# Patient Record
Sex: FEMALE | Race: WHITE | NOT HISPANIC OR LATINO | Employment: OTHER | URBAN - METROPOLITAN AREA
[De-identification: names, ages, dates, MRNs, and addresses within clinical notes are randomized per-mention and may not be internally consistent; named-entity substitution may affect disease eponyms.]

---

## 2017-01-20 ENCOUNTER — OFFICE VISIT (OUTPATIENT)
Dept: INTERNAL MEDICINE | Facility: CLINIC | Age: 67
End: 2017-01-20

## 2017-01-20 VITALS
OXYGEN SATURATION: 95 % | HEIGHT: 66 IN | WEIGHT: 193 LBS | DIASTOLIC BLOOD PRESSURE: 88 MMHG | SYSTOLIC BLOOD PRESSURE: 140 MMHG | BODY MASS INDEX: 31.02 KG/M2 | TEMPERATURE: 98.6 F | RESPIRATION RATE: 16 BRPM | HEART RATE: 72 BPM

## 2017-01-20 DIAGNOSIS — M54.41 ACUTE RIGHT-SIDED LOW BACK PAIN WITH RIGHT-SIDED SCIATICA: Primary | ICD-10-CM

## 2017-01-20 PROBLEM — K63.5 COLON POLYP: Status: ACTIVE | Noted: 2017-01-20

## 2017-01-20 PROBLEM — M54.40 ACUTE RIGHT-SIDED LOW BACK PAIN WITH SCIATICA: Status: ACTIVE | Noted: 2017-01-20

## 2017-01-20 PROCEDURE — 99213 OFFICE O/P EST LOW 20 MIN: CPT | Performed by: INTERNAL MEDICINE

## 2017-01-20 NOTE — MR AVS SNAPSHOT
"                        Adelia Morris   1/20/2017 2:40 PM   Office Visit    Dept Phone:  [phone]   Encounter #:  [id number]    Provider:  Wu Yost MD   Department:  [facility] INTERNAL MEDICINE                Your Full Care Plan              Your Updated Medication List          This list is accurate as of: 1/20/17  3:13 PM.  Always use your most recent med list.                cetirizine 10 MG tablet   Commonly known as:  zyrTEC       CITRACAL PO       ibuprofen 200 MG tablet   Commonly known as:  ADVIL,MOTRIN       nabumetone 750 MG tablet   Commonly known as:  RELAFEN   Take 1 tablet by mouth 2 (two) times a day.       PEG-KCl-NaCl-NaSulf-Na Asc-C 100 G reconstituted solution   Commonly known as:  MOVIPREP   Take as directed.               You Were Diagnosed With        Codes Comments    Acute right-sided low back pain with right-sided sciatica    -  Primary ICD-10-CM: M54.41  ICD-9-CM: 724.2, 724.3 We will treat with a tapering dose of prednisone, to start 15 mg twice daily for 3 days, then 10 mg twice daily for 3 days, then 10 mg each morning for 7 days      Instructions     None    Patient Instructions History      Upcoming Appointments     Visit Type Date Time Department    OFFICE VISIT 1/20/2017  2:40 PM [facility] 3 [id number]      [employer] Signup     Our records indicate that you have declined [facility] [employer] signup. If you would like to sign up for [employer], please email [email or web address] or call [phone] to obtain an activation code.             Other Info from Your Visit           Allergies     No Known Allergies      Reason for Visit     Back Pain           Vital Signs     Blood Pressure Pulse Temperature Respirations Height Weight    140/88 (BP Location: Right arm, Patient Position: Sitting, Cuff Size: Small Adult) 72 98.6 °F (37 °C) (Tympanic) 16 65.5\" (166.4 cm) 193 lb (87.5 kg)    Oxygen Saturation Body Mass Index Smoking Status             95% 31.63 " kg/m2 Never Smoker         Problems and Diagnoses Noted     Acute right-sided low back pain with sciatica    Colon polyp

## 2017-01-20 NOTE — PROGRESS NOTES
Subjective   Adelia Morris is a 66 y.o. female.   10/24/2016  Wt Readings from Last 1 Encounters:   01/20/17 193 lb (87.5 kg)    she was last seen October 2016 for initial Medicare visit.    She returns for an acute care visit regarding right-sided low back pain    History of Present Illness   The patient states that in late December she helped her  left her couch and she pulled her back, describing a painful area in the right sacral region but also pain radiating down her Route right lower extremity to her ankle.  She has had similar symptoms in the past but this has continued.  No skin eruption has occurred.    She had a screening colonoscopy in December by Dr. Jarrod John was found to have a hyperplastic polyp.  She was advised to have a repeat colonoscopy after 5 years.  The following portions of the patient's history were reviewed and updated as appropriate: allergies, current medications, past family history, past medical history, past social history, past surgical history and problem list.    Review of Systems   Constitutional: Negative.    HENT: Negative.    Eyes: Negative.    Respiratory: Negative.    Cardiovascular: Negative.    Endocrine: Negative.    Genitourinary: Negative.    Musculoskeletal: Positive for back pain.   Skin: Negative.    Neurological: Negative.    Hematological: Negative.    Psychiatric/Behavioral: Negative.        Objective   Physical Exam   Constitutional: She appears well-developed and well-nourished.   HENT:   Head: Normocephalic and atraumatic.   Cardiovascular: Normal rate and regular rhythm.  Exam reveals no gallop and no friction rub.    No murmur heard.  Pulmonary/Chest: Effort normal and breath sounds normal. No respiratory distress. She has no wheezes. She has no rales.   Abdominal: Soft. Bowel sounds are normal. She exhibits no distension and no mass. There is no tenderness.   Musculoskeletal:   Lumbosacral spine flexion to 75°.  No symptoms on extension or  lateral flexion.  Straight leg raising is negative bilaterally   Neurological: She is alert.   Skin: Skin is warm.   Psychiatric: Her behavior is normal.   Vitals reviewed.      Assessment/Plan   Adelia was seen today for back pain.    Diagnoses and all orders for this visit:    Acute right-sided low back pain with right-sided sciatica  Comments:  We will treat with a tapering dose of prednisone, to start 15 mg twice daily for 3 days, then 10 mg twice daily for 3 days, then 10 mg each morning for 7 days        Return as scheduled, sooner if needed                       EMR Dragon/Transcription disclaimer:      Much of this encounter note is an electronic transcription/translation of spoken language to printed text. The electronic translation of spoken language may permit erroneous, or at times, nonsensical words or phrases to be inadvertently transcribed; Although I have reviewed the note for such errors, some may still exist.

## 2017-02-01 ENCOUNTER — OFFICE VISIT (OUTPATIENT)
Dept: INTERNAL MEDICINE | Facility: CLINIC | Age: 67
End: 2017-02-01

## 2017-02-01 VITALS
SYSTOLIC BLOOD PRESSURE: 139 MMHG | WEIGHT: 194 LBS | HEIGHT: 66 IN | OXYGEN SATURATION: 97 % | HEART RATE: 78 BPM | BODY MASS INDEX: 31.18 KG/M2 | TEMPERATURE: 98.4 F | DIASTOLIC BLOOD PRESSURE: 80 MMHG

## 2017-02-01 DIAGNOSIS — M54.41 ACUTE RIGHT-SIDED LOW BACK PAIN WITH RIGHT-SIDED SCIATICA: Primary | ICD-10-CM

## 2017-02-01 PROCEDURE — 99213 OFFICE O/P EST LOW 20 MIN: CPT | Performed by: INTERNAL MEDICINE

## 2017-02-01 RX ORDER — PREDNISONE 1 MG/1
TABLET ORAL
COMMUNITY
Start: 2017-01-20 | End: 2017-02-01

## 2017-02-01 NOTE — PROGRESS NOTES
Subjective   Adelia Morris is a 66 y.o. female.   1/20/2017  Wt Readings from Last 1 Encounters:   02/01/17 194 lb (88 kg)    she was last seen January 20 because of back pain.    She returns for recheck of right-sided low back pain radiating to the right leg    History of Present Illness   She presented January 20 at that time had right-sided low back pain with radiation to the right lower extremity.  She was treated with a tapering dose of prednisone.  She is also been using alternating ice and moist heat.  She still describes having some radiculopathy with pain radiating from her hip down into her foot and ankle, out to the big toe.  She has not had any weakness.  She describe bowel or bladder problems.  She is interested in trying physical therapy.  We have not obtained any radiologic studies thus far.  The following portions of the patient's history were reviewed and updated as appropriate: allergies, current medications, past family history, past medical history, past social history, past surgical history and problem list.    Review of Systems   Musculoskeletal: Positive for back pain.       Objective   Physical Exam   Abdominal: Soft. She exhibits no distension and no mass. There is no tenderness.   Musculoskeletal:   Lumbosacral anterior flexion about 35°.  Straight leg raising is negative bilaterally, gait is normal   Neurological: She has normal reflexes.       Assessment/Plan   Adelia was seen today for back pain.    Diagnoses and all orders for this visit:    Acute right-sided low back pain with right-sided sciatica  Comments:  I will refer for physical therapy, patient prefers [facility] in [place]  Orders:  -     Ambulatory Referral to Physical Therapy Evaluate and treat        I keep follow-up as before, sooner if needed                       EMR Dragon/Transcription disclaimer:      Much of this encounter note is an electronic transcription/translation of spoken language to  printed text. The electronic translation of spoken language may permit erroneous, or at times, nonsensical words or phrases to be inadvertently transcribed; Although I have reviewed the note for such errors, some may still exist.

## 2017-02-15 ENCOUNTER — TELEPHONE (OUTPATIENT)
Dept: INTERNAL MEDICINE | Facility: CLINIC | Age: 67
End: 2017-02-15

## 2017-02-15 RX ORDER — SULFAMETHOXAZOLE AND TRIMETHOPRIM 800; 160 MG/1; MG/1
1 TABLET ORAL 2 TIMES DAILY
Qty: 10 TABLET | Refills: 0 | Status: SHIPPED | OUTPATIENT
Start: 2017-02-15 | End: 2017-05-31

## 2017-02-15 NOTE — TELEPHONE ENCOUNTER
Pt called and said she thinks a bladder infection and would like to see if you could send her something in to the pharmacy. Please advise.

## 2017-04-10 ENCOUNTER — TELEPHONE (OUTPATIENT)
Dept: INTERNAL MEDICINE | Facility: CLINIC | Age: 67
End: 2017-04-10

## 2017-04-10 NOTE — TELEPHONE ENCOUNTER
Pt called and is still having congestion with clear drainage, swollen glands and ear pain after almost a month. Pt would like to know if we could prescribe a zpak. Please advise.

## 2017-04-10 NOTE — TELEPHONE ENCOUNTER
Advise chlorpheniramine 6 mg twice daily, and send Astelin nasal spray, one unit, 2 sprays in each nostril twice a day    I do not think that this is likely a bacterial infection

## 2017-04-11 RX ORDER — AZELASTINE 1 MG/ML
2 SPRAY, METERED NASAL 2 TIMES DAILY
Qty: 1 EACH | Refills: 0 | Status: SHIPPED | OUTPATIENT
Start: 2017-04-11 | End: 2020-10-02

## 2017-05-11 ENCOUNTER — TELEPHONE (OUTPATIENT)
Dept: INTERNAL MEDICINE | Facility: CLINIC | Age: 67
End: 2017-05-11

## 2017-05-11 DIAGNOSIS — M54.5 LOW BACK PAIN, UNSPECIFIED BACK PAIN LATERALITY, UNSPECIFIED CHRONICITY, WITH SCIATICA PRESENCE UNSPECIFIED: Primary | ICD-10-CM

## 2017-05-31 ENCOUNTER — OFFICE VISIT (OUTPATIENT)
Dept: ORTHOPEDIC SURGERY | Facility: CLINIC | Age: 67
End: 2017-05-31

## 2017-05-31 VITALS — HEIGHT: 67 IN | TEMPERATURE: 99.2 F | BODY MASS INDEX: 31.08 KG/M2 | WEIGHT: 198 LBS

## 2017-05-31 DIAGNOSIS — M48.061 SPINAL STENOSIS OF LUMBAR REGION: ICD-10-CM

## 2017-05-31 DIAGNOSIS — M43.16 SPONDYLOLISTHESIS OF LUMBAR REGION: ICD-10-CM

## 2017-05-31 DIAGNOSIS — M54.50 LUMBAR SPINE PAIN: Primary | ICD-10-CM

## 2017-05-31 PROCEDURE — 72100 X-RAY EXAM L-S SPINE 2/3 VWS: CPT | Performed by: ORTHOPAEDIC SURGERY

## 2017-05-31 PROCEDURE — 99204 OFFICE O/P NEW MOD 45 MIN: CPT | Performed by: ORTHOPAEDIC SURGERY

## 2017-06-05 ENCOUNTER — HOSPITAL ENCOUNTER (OUTPATIENT)
Dept: MRI IMAGING | Facility: HOSPITAL | Age: 67
Discharge: HOME | End: 2017-06-05
Attending: ORTHOPAEDIC SURGERY | Admitting: ORTHOPAEDIC SURGERY

## 2017-06-05 DIAGNOSIS — M54.50 LUMBAR SPINE PAIN: ICD-10-CM

## 2017-06-05 DIAGNOSIS — M48.061 SPINAL STENOSIS OF LUMBAR REGION: ICD-10-CM

## 2017-06-05 DIAGNOSIS — M43.16 SPONDYLOLISTHESIS OF LUMBAR REGION: ICD-10-CM

## 2017-06-05 PROCEDURE — 72148 MRI LUMBAR SPINE W/O DYE: CPT

## 2017-06-09 ENCOUNTER — TELEPHONE (OUTPATIENT)
Dept: ORTHOPEDIC SURGERY | Facility: CLINIC | Age: 67
End: 2017-06-09

## 2017-06-09 DIAGNOSIS — M43.16 SPONDYLOLISTHESIS OF LUMBAR REGION: ICD-10-CM

## 2017-06-09 DIAGNOSIS — M48.061 LUMBAR SPINAL STENOSIS: Primary | ICD-10-CM

## 2017-06-09 NOTE — TELEPHONE ENCOUNTER
Pt called back and she was informed of her results.  I have placed an order for epidurals,however she is not certain if she wants them.  Appt was scheduled for her to see KIMO on 6/16/17 to discuss her options.

## 2017-06-29 ENCOUNTER — HOSPITAL ENCOUNTER (OUTPATIENT)
Dept: PAIN MEDICINE | Facility: HOSPITAL | Age: 67
Discharge: HOME | End: 2017-06-29
Attending: ORTHOPAEDIC SURGERY | Admitting: ORTHOPAEDIC SURGERY

## 2017-06-29 ENCOUNTER — ANESTHESIA (OUTPATIENT)
Dept: PAIN MEDICINE | Facility: HOSPITAL | Age: 67
End: 2017-06-29

## 2017-06-29 ENCOUNTER — HOSPITAL ENCOUNTER (OUTPATIENT)
Dept: GENERAL RADIOLOGY | Facility: HOSPITAL | Age: 67
Discharge: HOME | End: 2017-06-29

## 2017-06-29 ENCOUNTER — ANESTHESIA EVENT (OUTPATIENT)
Dept: PAIN MEDICINE | Facility: HOSPITAL | Age: 67
End: 2017-06-29

## 2017-06-29 ENCOUNTER — TRANSCRIBE ORDERS (OUTPATIENT)
Dept: PAIN MEDICINE | Facility: HOSPITAL | Age: 67
End: 2017-06-29

## 2017-06-29 VITALS
DIASTOLIC BLOOD PRESSURE: 65 MMHG | TEMPERATURE: 97.6 F | WEIGHT: 193 LBS | SYSTOLIC BLOOD PRESSURE: 127 MMHG | RESPIRATION RATE: 16 BRPM | HEIGHT: 67 IN | HEART RATE: 67 BPM | BODY MASS INDEX: 30.29 KG/M2 | OXYGEN SATURATION: 96 %

## 2017-06-29 DIAGNOSIS — R52 PAIN: ICD-10-CM

## 2017-06-29 DIAGNOSIS — M48.061 LUMBAR SPINAL STENOSIS: Primary | ICD-10-CM

## 2017-06-29 DIAGNOSIS — M43.16 SPONDYLOLISTHESIS OF LUMBAR REGION: ICD-10-CM

## 2017-06-29 DIAGNOSIS — M48.061 LUMBAR SPINAL STENOSIS: ICD-10-CM

## 2017-06-29 PROCEDURE — C1755 CATHETER, INTRASPINAL: HCPCS

## 2017-06-29 PROCEDURE — 77003 FLUOROGUIDE FOR SPINE INJECT: CPT

## 2017-06-29 PROCEDURE — 0 IOPAMIDOL 41 % SOLUTION: Performed by: ANESTHESIOLOGY

## 2017-06-29 PROCEDURE — 25010000002 METHYLPREDNISOLONE PER 80 MG: Performed by: ANESTHESIOLOGY

## 2017-06-29 RX ORDER — LIDOCAINE HYDROCHLORIDE 10 MG/ML
1 INJECTION, SOLUTION INFILTRATION; PERINEURAL ONCE
Status: DISCONTINUED | OUTPATIENT
Start: 2017-06-29 | End: 2017-06-30 | Stop reason: HOSPADM

## 2017-06-29 RX ORDER — FENTANYL CITRATE 50 UG/ML
50 INJECTION, SOLUTION INTRAMUSCULAR; INTRAVENOUS
Status: DISCONTINUED | OUTPATIENT
Start: 2017-06-29 | End: 2017-06-30 | Stop reason: HOSPADM

## 2017-06-29 RX ORDER — MIDAZOLAM HYDROCHLORIDE 1 MG/ML
1 INJECTION INTRAMUSCULAR; INTRAVENOUS
Status: DISCONTINUED | OUTPATIENT
Start: 2017-06-29 | End: 2017-06-30 | Stop reason: HOSPADM

## 2017-06-29 RX ORDER — SODIUM CHLORIDE 0.9 % (FLUSH) 0.9 %
1-10 SYRINGE (ML) INJECTION AS NEEDED
Status: DISCONTINUED | OUTPATIENT
Start: 2017-06-29 | End: 2017-06-30 | Stop reason: HOSPADM

## 2017-06-29 RX ORDER — METHYLPREDNISOLONE ACETATE 80 MG/ML
80 INJECTION, SUSPENSION INTRA-ARTICULAR; INTRALESIONAL; INTRAMUSCULAR; SOFT TISSUE ONCE
Status: COMPLETED | OUTPATIENT
Start: 2017-06-29 | End: 2017-06-29

## 2017-06-29 RX ADMIN — METHYLPREDNISOLONE ACETATE 80 MG: 80 INJECTION, SUSPENSION INTRA-ARTICULAR; INTRALESIONAL; INTRAMUSCULAR; SOFT TISSUE at 08:34

## 2017-06-29 RX ADMIN — IOPAMIDOL 10 ML: 408 INJECTION, SOLUTION INTRATHECAL at 08:34

## 2017-06-29 NOTE — H&P
CHIEF COMPLAINT: right lower extremity pain      HISTORY OF PRESENT ILLNESS:  Since December after lifting a heavy count she began to experience painin her right buttock which radiated posteriorly down the right leg to the calf.  Intermittent in timing.  Between a 3 and 7 out of 10 on the pain scale.  Aching deep pressure dull burning tingling in nature.  Aggravated by activity and certain sitting positions.  She does not has done physical therapy for over 8 weeks with some improvement.  She has also taken ibuprofen with some relief.  It's affecting her ADLs her exercising her mood.    PAST MEDICAL HISTORY:  no known drug allergies  Medications include Advil zyrtec  Obesity with a BMI of 30    SOCIAL HISTORY:  No tobacco    REVIEW OF SYSTEMS:  No hematologic infectious or constitutional symptoms    PHYSICAL EXAM:  /77 pulse 83 respirations 16 sat 96% temp 36.4 height 5 foot 7 weight 193 pounds BMI 30  Well-developed well-nourished no acute distress  Extra ocular movements intact  Mallampati class II airway  Cardiac:  Regular rate and rhythm  Lungs:  Clear to auscultation bilaterally with good effort  Alert and oriented ×3  Deep tendon reflexes normal in the bilateral patella  positive straight leg raise bilaterally  5 out of 5 strength bilateral upper and lower extremities  Lumbar spine without obvious deformities ecchymoses  Lumbar spine nontender to palpation      DIAGNOSIS:  Lumbar Neuritis  Degeneration of lumbar intervertebral disc      PLAN:  1.  Lumbar epidural steroid injections, up to 3, spaced 1-2 weeks apart.  If pain control is acceptable after 1 or 2 injections, it was discussed with the patient that they may return for the subsequent injections if and when their pain returns.  The risks were discussed with the patient including failure of relief, worsening pain, Headache (post dural puncture headache), bleeding (epidural hematoma) and infection (epidural abscess or skin infection).  2.  Physical  therapy exercises at home as prescribed by physical therapy or from the pain clinic handout (given to the patient).  Continuation of these exercises every day, or multiple times per week, even when the patient has good pain relief, was stressed to the patient as a preventative measure to decrease the frequency and severity of future pain episodes.  3.  Continue pain medicines as already prescribed.  If patient not currently taking any, it is recommended to begin Acetaminophen 1000 mg po q 8 hours.  If other medicines containing Acetaminophen are currently prescribed, maintain daily dose at 3000 mg.    4.  If they can tolerate NSAIDS, it is recommended to take Ibuprofen 600 mg po q 6 hours for 7 days during pain exacerbations.  Alternatively, they may substitute an NSAID of their choice (e.g. Aleve).  This may be taken at the same time as Acetaminophen.  5.  Heat and ice to the affected area as tolerated for pain control.  It was discussed that heating pads can cause burns.  6.  Daily low impact exercise such as walking or water exercise was recommended to maintain overall health and aid in weight control.   7.  Follow up as needed for subsequent injections.  8.  Patient was counseled to abstain from tobacco products.

## 2017-06-29 NOTE — ANESTHESIA PROCEDURE NOTES
PAIN Epidural block    Patient location during procedure: pain clinic  Indication:procedure for pain  Performed By  Anesthesiologist: KATY SEARS  Preanesthetic Checklist  Completed: patient identified and risks and benefits discussed  Additional Notes  Diagnosis:     Lumbar Neuritis  Degeneration of lumbar intervertebral disc      Sedation:  no    A lumbar epidural steroid injection with fluoroscopic guidance and an Isovue dye epidurogram was performed.  Under fluoroscopic guidance, the epidural space was identified and accessed, confirmed by loss of resistance to saline followed by injection of Isovue dye, which shows a good epidurogram.  The above medications were injected uneventfully.    Epidural Block Prep:  Pt Position:prone  Sterile Tech:cap, gloves, mask and sterile barrier  Prep:chlorhexidine gluconate and isopropyl alcohol  Monitoring:blood pressure monitoring, continuous pulse oximetry and EKG  Epidural Block Procedure:  Approach:right paramedian  Guidance: fluoroscopy and landmark technique  Location:lumbar  Interspace: L5-S1.  Needle Type:Tuohy  Needle Gauge:20  Aspiration:negative  Medications:  Depomedrol:80  Preservative Free Saline:3mL  Isovue:2mL  Comments:Isovue dye reveals good epidurogram  Post Assessment:  Pt Tolerance:patient tolerated the procedure well with no apparent complications  Complications:no

## 2017-10-25 ENCOUNTER — CLINICAL SUPPORT (OUTPATIENT)
Dept: INTERNAL MEDICINE | Facility: CLINIC | Age: 67
End: 2017-10-25

## 2017-10-25 DIAGNOSIS — Z23 NEED FOR INFLUENZA VACCINATION: Primary | ICD-10-CM

## 2017-10-25 PROCEDURE — G0008 ADMIN INFLUENZA VIRUS VAC: HCPCS | Performed by: INTERNAL MEDICINE

## 2017-10-30 ENCOUNTER — TRANSCRIBE ORDERS (OUTPATIENT)
Dept: ADMINISTRATIVE | Facility: HOSPITAL | Age: 67
End: 2017-10-30

## 2017-10-30 DIAGNOSIS — Z13.820 SCREENING FOR OSTEOPOROSIS: ICD-10-CM

## 2017-10-30 DIAGNOSIS — Z12.31 VISIT FOR SCREENING MAMMOGRAM: Primary | ICD-10-CM

## 2017-11-30 ENCOUNTER — HOSPITAL ENCOUNTER (OUTPATIENT)
Dept: BONE DENSITY | Facility: HOSPITAL | Age: 67
Discharge: HOME | End: 2017-11-30
Attending: SPECIALIST

## 2017-11-30 ENCOUNTER — HOSPITAL ENCOUNTER (OUTPATIENT)
Dept: MAMMOGRAPHY | Facility: HOSPITAL | Age: 67
Discharge: HOME | End: 2017-11-30
Attending: SPECIALIST | Admitting: SPECIALIST

## 2017-11-30 DIAGNOSIS — Z13.820 SCREENING FOR OSTEOPOROSIS: ICD-10-CM

## 2017-11-30 DIAGNOSIS — Z12.31 VISIT FOR SCREENING MAMMOGRAM: ICD-10-CM

## 2017-11-30 PROCEDURE — G0202 SCR MAMMO BI INCL CAD: HCPCS

## 2017-11-30 PROCEDURE — 77063 BREAST TOMOSYNTHESIS BI: CPT

## 2017-11-30 PROCEDURE — 77080 DXA BONE DENSITY AXIAL: CPT

## 2018-01-26 ENCOUNTER — OFFICE VISIT (OUTPATIENT)
Dept: INTERNAL MEDICINE | Facility: CLINIC | Age: 68
End: 2018-01-26

## 2018-01-26 VITALS
HEART RATE: 99 BPM | HEIGHT: 67 IN | SYSTOLIC BLOOD PRESSURE: 130 MMHG | BODY MASS INDEX: 31.08 KG/M2 | DIASTOLIC BLOOD PRESSURE: 90 MMHG | RESPIRATION RATE: 16 BRPM | OXYGEN SATURATION: 98 % | WEIGHT: 198 LBS | TEMPERATURE: 100 F

## 2018-01-26 DIAGNOSIS — L27.0 ALLERGIC DRUG RASH: Primary | ICD-10-CM

## 2018-01-26 PROCEDURE — 99212 OFFICE O/P EST SF 10 MIN: CPT | Performed by: INTERNAL MEDICINE

## 2018-01-26 NOTE — PROGRESS NOTES
Subjective   Adelia Morris is a 67 y.o. female.   Visit date not found  Wt Readings from Last 1 Encounters:   01/26/18 89.8 kg (198 lb)   She was last seen February 2017, weight was 194 then, 198 now, with right foot cast in place    She returns for acute care visit because of rash she had right foot surgery on January 8 on a bunion.  She was prescribed Percocet when necessary for pain and clindamycin mouth 3 times a day for about 7 days.  She says that is last Sunday evening 5 days ago she developed hives on her back and then later had rash around the left breast area and across her chest, subsequently progressing to involve her legs.  She describes itching and burning and the rash did appear like hives.  She is had low-grade fever detected today which she was not aware of that.  She did take Benadryl this morning.  She has not had any of the clindamycin for a few days.  The clindamycin was prescribed as a prophylactic anabiotic and the surgical area on her right foot was inspected by the podiatrist and found to be healing well, not infected.    History of Present Illness     The following portions of the patient's history were reviewed and updated as appropriate: allergies, current medications, past family history, past medical history, past social history, past surgical history and problem list.    Review of Systems   Constitutional: Negative.    HENT: Negative.    Eyes: Negative.    Respiratory: Negative.    Cardiovascular: Negative.    Endocrine: Negative.    Genitourinary: Negative.    Skin: Positive for rash.   Neurological: Negative.    Hematological: Negative.    Psychiatric/Behavioral: Negative.        Objective   Physical Exam   Constitutional: She appears well-developed and well-nourished.   HENT:   Head: Normocephalic and atraumatic.   Cardiovascular: Normal rate and regular rhythm.  Exam reveals no gallop and no friction rub.    No murmur heard.  Pulmonary/Chest: Effort normal and breath sounds  normal. No respiratory distress. She has no wheezes. She has no rales.   Abdominal: She exhibits no distension and no mass. There is no tenderness.   Neurological: She is alert.   Skin: Skin is warm.   Faint erythematous rash back chest and legs   Psychiatric: Her behavior is normal.   Vitals reviewed.      Assessment/Plan   Adelia was seen today for rash.    Diagnoses and all orders for this visit:    Allergic drug rash  Comments:  Suspect that this is a allergic rash due to clindamycin.  Advise avoidance of clindamycin in the future and advise Zyrtec 10 mg daily for 10 days.  I will load allergy to clindamycin on the allergy record.  Call if the rash should recur    Follow-up as needed                               EMR Dragon/Transcription disclaimer:      Much of this encounter note is an electronic transcription/translation of spoken language to printed text. The electronic translation of spoken language may permit erroneous, or at times, nonsensical words or phrases to be inadvertently transcribed; Although I have reviewed the note for such errors, some may still exist.

## 2018-02-07 RX ORDER — OSELTAMIVIR PHOSPHATE 75 MG/1
75 CAPSULE ORAL DAILY
Qty: 10 CAPSULE | Refills: 0 | Status: SHIPPED | OUTPATIENT
Start: 2018-02-07 | End: 2018-11-07

## 2018-10-24 ENCOUNTER — TRANSCRIBE ORDERS (OUTPATIENT)
Dept: ADMINISTRATIVE | Facility: HOSPITAL | Age: 68
End: 2018-10-24

## 2018-10-24 DIAGNOSIS — Z12.39 SCREENING BREAST EXAMINATION: Primary | ICD-10-CM

## 2018-11-07 ENCOUNTER — OFFICE VISIT (OUTPATIENT)
Dept: INTERNAL MEDICINE | Facility: CLINIC | Age: 68
End: 2018-11-07

## 2018-11-07 VITALS
OXYGEN SATURATION: 98 % | DIASTOLIC BLOOD PRESSURE: 80 MMHG | HEIGHT: 67 IN | SYSTOLIC BLOOD PRESSURE: 120 MMHG | BODY MASS INDEX: 29.44 KG/M2 | TEMPERATURE: 97.8 F | RESPIRATION RATE: 16 BRPM | WEIGHT: 187.6 LBS | HEART RATE: 64 BPM

## 2018-11-07 DIAGNOSIS — Z00.00 HEALTHCARE MAINTENANCE: Primary | ICD-10-CM

## 2018-11-07 DIAGNOSIS — K63.5 POLYP OF COLON, UNSPECIFIED PART OF COLON, UNSPECIFIED TYPE: ICD-10-CM

## 2018-11-07 DIAGNOSIS — M85.859 OSTEOPENIA OF HIP, UNSPECIFIED LATERALITY: ICD-10-CM

## 2018-11-07 PROCEDURE — 90732 PPSV23 VACC 2 YRS+ SUBQ/IM: CPT | Performed by: INTERNAL MEDICINE

## 2018-11-07 PROCEDURE — G0009 ADMIN PNEUMOCOCCAL VACCINE: HCPCS | Performed by: INTERNAL MEDICINE

## 2018-11-07 PROCEDURE — G0439 PPPS, SUBSEQ VISIT: HCPCS | Performed by: INTERNAL MEDICINE

## 2018-11-07 NOTE — PROGRESS NOTES
QUICK REFERENCE INFORMATION:  The ABCs of the Annual Wellness Visit    Subsequent Medicare Wellness Visit    HEALTH RISK ASSESSMENT    1950    Recent Hospitalizations:  No hospitalization(s) within the last year..  She had right foot surgery in January because of a bunion.  His she has some stiffness but otherwise doing well.    She had allergic reaction to clindamycin in January and should avoid clindamycin in the future.    She has had some lumbar disc disease and had physical therapy wouldn't back is doing well now.    She is due for Pneumovax 23 and hepatitis C screening    She had bone mineral density checked and had osteopenia.  She has been advised vitamin D supplement.    She had colonoscopy December 2016 by Dr. Jarrod John      Current Medical Providers:  Patient Care Team:  Wu Yost MD as PCP - General  Derek Gonzales DPM as PCP - Claims Attributed    Dr Lacy Avila, Gynecologist    Smoking Status:  History   Smoking Status   • Never Smoker   Smokeless Tobacco   • Never Used       Alcohol Consumption:  History   Alcohol Use No       Depression Screen:   PHQ-2/PHQ-9 Depression Screening 11/7/2018   Little interest or pleasure in doing things 0   Feeling down, depressed, or hopeless 0   Trouble falling or staying asleep, or sleeping too much -   Feeling tired or having little energy -   Poor appetite or overeating -   Feeling bad about yourself - or that you are a failure or have let yourself or your family down -   Trouble concentrating on things, such as reading the newspaper or watching television -   Moving or speaking so slowly that other people could have noticed. Or the opposite - being so fidgety or restless that you have been moving around a lot more than usual -   Thoughts that you would be better off dead, or of hurting yourself in some way -   Total Score 0   If you checked off any problems, how difficult have these problems made it for you to do your work, take care of things  at home, or get along with other people? -       Health Habits and Functional and Cognitive Screening:  Functional & Cognitive Status 11/7/2018   Do you have difficulty preparing food and eating? No   Do you have difficulty bathing yourself, getting dressed or grooming yourself? No   Do you have difficulty using the toilet? No   Do you have difficulty moving around from place to place? No   Do you have trouble with steps or getting out of a bed or a chair? No   In the past year have you fallen or experienced a near fall? No   Current Diet Well Balanced Diet   Dental Exam Up to date   Eye Exam Up to date   Exercise (times per week) 4 times per week   Current Exercise Activities Include Cardiovasular Workout on Exercise Equipment   Do you need help using the phone?  No   Are you deaf or do you have serious difficulty hearing?  No   Do you need help with transportation? No   Do you need help shopping? No   Do you need help preparing meals?  No   Do you need help with housework?  No   Do you need help with laundry? No   Do you need help taking your medications? No   Do you need help managing money? No   Do you ever drive or ride in a car without wearing a seat belt? No   Have you felt unusual stress, anger or loneliness in the last month? No   Who do you live with? Spouse   If you need help, do you have trouble finding someone available to you? No   Have you been bothered in the last four weeks by sexual problems? No   Do you have difficulty concentrating, remembering or making decisions? No           Does the patient have evidence of cognitive impairment? No    Aspirin use counseling: Does not need ASA (and currently is not on it)      Recent Lab Results:  CMP:  Lab Results   Component Value Date    GLU 91 10/24/2016    BUN 10 10/24/2016    CREATININE 0.79 10/24/2016    EGFRIFNONA 73 10/24/2016    EGFRIFAFRI 89 10/24/2016    BCR 12.7 10/24/2016     10/24/2016    K 4.6 10/24/2016    CO2 27.0 10/24/2016    CALCIUM  10.0 10/24/2016    PROTENTOTREF 7.7 10/24/2016    ALBUMIN 5.00 10/24/2016    LABGLOBREF 2.7 10/24/2016    LABIL2 1.9 10/24/2016    BILITOT 0.3 10/24/2016    ALKPHOS 62 10/24/2016    AST 11 10/24/2016    ALT 14 10/24/2016     Lipid Panel:  Lab Results   Component Value Date    TRIG 86 10/24/2016    HDL 57 10/24/2016    VLDL 17.2 10/24/2016     HbA1c:  Lab Results   Component Value Date    HGBA1C 5.50 10/24/2016       Visual Acuity:  No exam data present    Age-appropriate Screening Schedule:  Refer to the list below for future screening recommendations based on patient's age, sex and/or medical conditions. Orders for these recommended tests are listed in the plan section. The patient has been provided with a written plan.    Health Maintenance   Topic Date Due   • TDAP/TD VACCINES (1 - Tdap) 06/16/1970   • ZOSTER VACCINE (1 of 2) 06/16/2001   • PNEUMOCOCCAL VACCINES (65+ LOW/MEDIUM RISK) (2 of 2 - PPSV23) 10/24/2017   • MAMMOGRAM  11/30/2019   • COLONOSCOPY  12/09/2026   • INFLUENZA VACCINE  Completed        Subjective   History of Present Illness    Adelia Morris is a 68 y.o. female who presents for an Subsequent Wellness Visit.    The following portions of the patient's history were reviewed and updated as appropriate: allergies, current medications, past family history, past medical history, past social history, past surgical history and problem list.    Outpatient Medications Prior to Visit   Medication Sig Dispense Refill   • azelastine (ASTELIN) 0.1 % nasal spray 2 sprays into each nostril 2 (Two) Times a Day. Use in each nostril as directed 1 each 0   • Calcium Citrate (CITRACAL PO) Take  by mouth.     • cetirizine (ZyrTEC) 10 MG tablet Take 10 mg by mouth daily.     • hydrocortisone 2.5 % cream      • ibuprofen (ADVIL,MOTRIN) 200 MG tablet Take 200 mg by mouth Every 6 (Six) Hours As Needed for mild pain (1-3).     • oseltamivir (TAMIFLU) 75 MG capsule Take 1 capsule by mouth Daily. 10 capsule 0     No  "facility-administered medications prior to visit.        Patient Active Problem List   Diagnosis   • Healthcare maintenance   • Encounter for screening colonoscopy   • Colon polyp   • Acute right-sided low back pain with sciatica   • Allergic drug rash       Advance Care Planning:  has an advance directive - a copy has been provided and is in file    Identification of Risk Factors:  Risk factors include: cardiovascular risk.    Review of Systems    Compared to one year ago, the patient feels her physical health is better.  Compared to one year ago, the patient feels her mental health is the same.    Objective     Physical Exam    Vitals:    11/07/18 0956   BP: 120/80   BP Location: Left arm   Patient Position: Sitting   Cuff Size: Small Adult   Pulse: 64   Resp: 16   Temp: 97.8 °F (36.6 °C)   TempSrc: Tympanic   SpO2: 98%   Weight: 85.1 kg (187 lb 9.6 oz)   Height: 170.2 cm (67\")   PainSc: 0-No pain       Patient's Body mass index is 29.38 kg/m². BMI is above normal parameters. Recommendations include: exercise counseling and nutrition counseling.      Assessment/Plan   Patient Self-Management and Personalized Health Advice  The patient has been provided with information about: weight management and prevention of cardiac or vascular disease and preventive services including:   · Counseling for cardiovascular disease risk reduction, Pneumococcal vaccine .    Visit Diagnoses:  No diagnosis found.    No orders of the defined types were placed in this encounter.      Outpatient Encounter Prescriptions as of 11/7/2018   Medication Sig Dispense Refill   • azelastine (ASTELIN) 0.1 % nasal spray 2 sprays into each nostril 2 (Two) Times a Day. Use in each nostril as directed 1 each 0   • Calcium Citrate (CITRACAL PO) Take  by mouth.     • cetirizine (ZyrTEC) 10 MG tablet Take 10 mg by mouth daily.     • hydrocortisone 2.5 % cream      • ibuprofen (ADVIL,MOTRIN) 200 MG tablet Take 200 mg by mouth Every 6 (Six) Hours As Needed " for mild pain (1-3).     • [DISCONTINUED] oseltamivir (TAMIFLU) 75 MG capsule Take 1 capsule by mouth Daily. 10 capsule 0     No facility-administered encounter medications on file as of 11/7/2018.        Reviewed use of high risk medication in the elderly: yes  Reviewed for potential of harmful drug interactions in the elderly: yes    Follow Up:  No Follow-up on file.   She will see Dr. Brian Khanna for medical follow-up in the future    An After Visit Summary and PPPS with all of these plans were given to the patient.

## 2018-11-08 LAB
ALBUMIN SERPL-MCNC: 4.2 G/DL (ref 3.5–5.2)
ALBUMIN/GLOB SERPL: 1.5 G/DL
ALP SERPL-CCNC: 67 U/L (ref 39–117)
ALT SERPL-CCNC: 11 U/L (ref 1–33)
AST SERPL-CCNC: 10 U/L (ref 1–32)
BILIRUB SERPL-MCNC: 0.4 MG/DL (ref 0.1–1.2)
BUN SERPL-MCNC: 10 MG/DL (ref 8–23)
BUN/CREAT SERPL: 12.7 (ref 7–25)
CALCIUM SERPL-MCNC: 9.8 MG/DL (ref 8.6–10.5)
CHLORIDE SERPL-SCNC: 104 MMOL/L (ref 98–107)
CO2 SERPL-SCNC: 24.2 MMOL/L (ref 22–29)
CREAT SERPL-MCNC: 0.79 MG/DL (ref 0.57–1)
GLOBULIN SER CALC-MCNC: 2.8 GM/DL
GLUCOSE SERPL-MCNC: 86 MG/DL (ref 65–99)
HCV AB S/CO SERPL IA: <0.1 S/CO RATIO (ref 0–0.9)
POTASSIUM SERPL-SCNC: 4.3 MMOL/L (ref 3.5–5.2)
PROT SERPL-MCNC: 7 G/DL (ref 6–8.5)
SODIUM SERPL-SCNC: 142 MMOL/L (ref 136–145)

## 2018-11-12 ENCOUNTER — TELEPHONE (OUTPATIENT)
Dept: INTERNAL MEDICINE | Facility: CLINIC | Age: 68
End: 2018-11-12

## 2018-11-12 NOTE — TELEPHONE ENCOUNTER
Pt called and said she has some hard nodules like in her muscle region of her left arm and would like to know if she should be concerned or should make an appointment. Please advise.

## 2018-12-03 ENCOUNTER — HOSPITAL ENCOUNTER (OUTPATIENT)
Dept: MAMMOGRAPHY | Facility: HOSPITAL | Age: 68
Discharge: HOME | End: 2018-12-03
Attending: INTERNAL MEDICINE | Admitting: INTERNAL MEDICINE

## 2018-12-03 DIAGNOSIS — Z12.39 SCREENING BREAST EXAMINATION: ICD-10-CM

## 2018-12-03 PROCEDURE — 77063 BREAST TOMOSYNTHESIS BI: CPT

## 2018-12-03 PROCEDURE — 77067 SCR MAMMO BI INCL CAD: CPT

## 2018-12-19 ENCOUNTER — OFFICE VISIT (OUTPATIENT)
Dept: INTERNAL MEDICINE | Facility: CLINIC | Age: 68
End: 2018-12-19

## 2018-12-19 VITALS
HEART RATE: 80 BPM | HEIGHT: 67 IN | SYSTOLIC BLOOD PRESSURE: 130 MMHG | WEIGHT: 190.2 LBS | OXYGEN SATURATION: 98 % | TEMPERATURE: 98.1 F | RESPIRATION RATE: 16 BRPM | DIASTOLIC BLOOD PRESSURE: 80 MMHG | BODY MASS INDEX: 29.85 KG/M2

## 2018-12-19 DIAGNOSIS — L72.9 CUTANEOUS CYST: Primary | ICD-10-CM

## 2018-12-19 PROCEDURE — 99212 OFFICE O/P EST SF 10 MIN: CPT | Performed by: INTERNAL MEDICINE

## 2018-12-19 NOTE — PROGRESS NOTES
Subjective   Adelia Morris is a 68 y.o. female.   11/7/2018  Wt Readings from Last 1 Encounters:   12/19/18 86.3 kg (190 lb 3.2 oz)   She comes in for acute care visit out of concern of a lump or nodule left brachial area    History of Present Illness   She has had a small nodule mid left brachial area anteriorly.  This been present for a while but she forgot to mention that on the last office visit.  It is not associated with pain or redness  The following portions of the patient's history were reviewed and updated as appropriate: allergies, current medications, past family history, past medical history, past social history, past surgical history and problem list.    Review of Systems   Constitutional: Negative.    HENT: Negative.    Eyes: Negative.    Respiratory: Negative.    Cardiovascular: Negative.    Endocrine: Negative.    Genitourinary: Negative.    Skin: Negative.    Neurological: Negative.    Hematological: Negative.    Psychiatric/Behavioral: Negative.        Objective   Physical Exam   Constitutional: She appears well-developed and well-nourished.   Skin:   There is small and intradermal nodule left mid brachium anteriorly with no superficial skin lesion.  No tenderness or erythema is present   Vitals reviewed.      Assessment/Plan   Adelia was seen today for mass.    Diagnoses and all orders for this visit:    Cutaneous cyst  Comments:  Small intradermal nodule mid left brachium, consistent with interdermal cyst, advise observation, suggest dermatologist evaluation when she sees him                               EMR Dragon/Transcription disclaimer:      Much of this encounter note is an electronic transcription/translation of spoken language to printed text. The electronic translation of spoken language may permit erroneous, or at times, nonsensical words or phrases to be inadvertently transcribed; Although I have reviewed the note for such errors, some may still exist.

## 2019-01-04 ENCOUNTER — OFFICE VISIT (OUTPATIENT)
Dept: FAMILY MEDICINE CLINIC | Facility: CLINIC | Age: 69
End: 2019-01-04

## 2019-01-04 VITALS
RESPIRATION RATE: 14 BRPM | BODY MASS INDEX: 29.51 KG/M2 | OXYGEN SATURATION: 98 % | HEART RATE: 69 BPM | DIASTOLIC BLOOD PRESSURE: 70 MMHG | SYSTOLIC BLOOD PRESSURE: 120 MMHG | TEMPERATURE: 97.8 F | WEIGHT: 188 LBS | HEIGHT: 67 IN

## 2019-01-04 DIAGNOSIS — M54.40 ACUTE RIGHT-SIDED LOW BACK PAIN WITH SCIATICA, SCIATICA LATERALITY UNSPECIFIED: Primary | ICD-10-CM

## 2019-01-04 PROCEDURE — 99214 OFFICE O/P EST MOD 30 MIN: CPT | Performed by: INTERNAL MEDICINE

## 2019-01-04 RX ORDER — CHOLECALCIFEROL (VITAMIN D3) 50 MCG
2000 TABLET ORAL DAILY
COMMUNITY
End: 2020-09-21 | Stop reason: DRUGHIGH

## 2019-01-04 NOTE — PROGRESS NOTES
Subjective   Adelia Morris is a 68 y.o. female.     History of Present Illness   Patient was seen for low back pain.  She has chronic low back pain that is been controlled with her NSAID.  Patient will have labs drawn and follow-up.    Dictated utilizing Dragon dictation. If there are questions or for further clarification, please contact me.   The following portions of the patient's history were reviewed and updated as appropriate: allergies, current medications, past family history, past medical history, past social history, past surgical history and problem list.    Review of Systems   Constitutional: Negative for fatigue and fever.   HENT: Positive for congestion. Negative for trouble swallowing.    Eyes: Negative for discharge and visual disturbance.   Respiratory: Negative for choking and shortness of breath.    Cardiovascular: Negative for chest pain and palpitations.   Gastrointestinal: Negative for abdominal pain and blood in stool.   Endocrine: Negative.    Genitourinary: Negative for genital sores and hematuria.   Musculoskeletal: Positive for back pain. Negative for gait problem and joint swelling.   Skin: Negative for color change, pallor, rash and wound.   Allergic/Immunologic: Positive for environmental allergies. Negative for immunocompromised state.   Neurological: Negative for facial asymmetry and speech difficulty.   Psychiatric/Behavioral: Negative for hallucinations and suicidal ideas.       Objective   Physical Exam   Constitutional: She is oriented to person, place, and time. She appears well-developed and well-nourished.   HENT:   Head: Normocephalic.   Eyes: Conjunctivae are normal. Pupils are equal, round, and reactive to light.   Neck: Normal range of motion. Neck supple.   Cardiovascular: Normal rate, regular rhythm and normal heart sounds.   Pulmonary/Chest: Effort normal and breath sounds normal.   Abdominal: Soft. Bowel sounds are normal.   Musculoskeletal: She exhibits edema and  tenderness.   Neurological: She is alert and oriented to person, place, and time.   Skin: Skin is warm and dry.   Psychiatric: She has a normal mood and affect. Her behavior is normal. Judgment and thought content normal.   Nursing note and vitals reviewed.      Assessment/Plan   Problems Addressed this Visit        Nervous and Auditory    Acute right-sided low back pain with sciatica - Primary

## 2019-01-23 NOTE — TELEPHONE ENCOUNTER
----- Message from Luca Rubio MD sent at 6/9/2017 12:27 PM EDT -----  Tell her the MRI shows nerve compression, not quite as bad as I thought we would see that significant, and to try epidural injections.  If she fails to improve she needs to come back to discuss surgery.   Preventive measure

## 2019-06-28 ENCOUNTER — LAB (OUTPATIENT)
Dept: FAMILY MEDICINE CLINIC | Facility: CLINIC | Age: 69
End: 2019-06-28

## 2019-06-28 DIAGNOSIS — Z00.00 HEALTHCARE MAINTENANCE: Primary | ICD-10-CM

## 2019-07-05 ENCOUNTER — OFFICE VISIT (OUTPATIENT)
Dept: FAMILY MEDICINE CLINIC | Facility: CLINIC | Age: 69
End: 2019-07-05

## 2019-07-05 VITALS
OXYGEN SATURATION: 96 % | SYSTOLIC BLOOD PRESSURE: 132 MMHG | BODY MASS INDEX: 30.13 KG/M2 | WEIGHT: 192 LBS | TEMPERATURE: 97.8 F | DIASTOLIC BLOOD PRESSURE: 74 MMHG | HEART RATE: 60 BPM | RESPIRATION RATE: 15 BRPM | HEIGHT: 67 IN

## 2019-07-05 DIAGNOSIS — D12.2 ADENOMATOUS POLYP OF ASCENDING COLON: ICD-10-CM

## 2019-07-05 DIAGNOSIS — M85.859 OSTEOPENIA OF HIP, UNSPECIFIED LATERALITY: ICD-10-CM

## 2019-07-05 DIAGNOSIS — M54.40 ACUTE RIGHT-SIDED LOW BACK PAIN WITH SCIATICA, SCIATICA LATERALITY UNSPECIFIED: Primary | ICD-10-CM

## 2019-07-05 DIAGNOSIS — R93.3 ABNORMAL FINDINGS ON DIAGNOSTIC IMAGING OF OTHER PARTS OF DIGESTIVE TRACT: ICD-10-CM

## 2019-07-05 DIAGNOSIS — E55.9 VITAMIN D DEFICIENCY: ICD-10-CM

## 2019-07-05 LAB
25(OH)D3 SERPL-MCNC: 45.8 NG/ML (ref 30–100)
ALBUMIN SERPL-MCNC: 4.2 G/DL (ref 3.5–5.2)
ALBUMIN/GLOB SERPL: 1.5 G/DL
ALP SERPL-CCNC: 61 U/L (ref 39–117)
ALT SERPL W P-5'-P-CCNC: 14 U/L (ref 1–33)
ANION GAP SERPL CALCULATED.3IONS-SCNC: 10.9 MMOL/L (ref 5–15)
AST SERPL-CCNC: 14 U/L (ref 1–32)
BILIRUB SERPL-MCNC: 0.5 MG/DL (ref 0.2–1.2)
BUN BLD-MCNC: 9 MG/DL (ref 8–23)
BUN/CREAT SERPL: 12.5 (ref 7–25)
CALCIUM SPEC-SCNC: 9.7 MG/DL (ref 8.6–10.5)
CHLORIDE SERPL-SCNC: 106 MMOL/L (ref 98–107)
CHOLEST SERPL-MCNC: 146 MG/DL (ref 0–200)
CO2 SERPL-SCNC: 25.1 MMOL/L (ref 22–29)
CREAT BLD-MCNC: 0.72 MG/DL (ref 0.57–1)
DEPRECATED RDW RBC AUTO: 43.2 FL (ref 37–54)
ERYTHROCYTE [DISTWIDTH] IN BLOOD BY AUTOMATED COUNT: 12.5 % (ref 12.3–15.4)
GFR SERPL CREATININE-BSD FRML MDRD: 81 ML/MIN/1.73
GLOBULIN UR ELPH-MCNC: 2.8 GM/DL
GLUCOSE BLD-MCNC: 92 MG/DL (ref 65–99)
HCT VFR BLD AUTO: 41.9 % (ref 34–46.6)
HDLC SERPL-MCNC: 53 MG/DL (ref 40–60)
HGB BLD-MCNC: 13.5 G/DL (ref 12–15.9)
LDLC SERPL CALC-MCNC: 82 MG/DL (ref 0–100)
LDLC/HDLC SERPL: 1.54 {RATIO}
MCH RBC QN AUTO: 30.3 PG (ref 26.6–33)
MCHC RBC AUTO-ENTMCNC: 32.2 G/DL (ref 31.5–35.7)
MCV RBC AUTO: 94.2 FL (ref 79–97)
PLATELET # BLD AUTO: 237 10*3/MM3 (ref 140–450)
PMV BLD AUTO: 9.3 FL (ref 6–12)
POTASSIUM BLD-SCNC: 4.3 MMOL/L (ref 3.5–5.2)
PROT SERPL-MCNC: 7 G/DL (ref 6–8.5)
RBC # BLD AUTO: 4.45 10*6/MM3 (ref 3.77–5.28)
SODIUM BLD-SCNC: 142 MMOL/L (ref 136–145)
TRIGL SERPL-MCNC: 56 MG/DL (ref 0–150)
VLDLC SERPL-MCNC: 11.2 MG/DL (ref 5–40)
WBC NRBC COR # BLD: 4.64 10*3/MM3 (ref 3.4–10.8)

## 2019-07-05 PROCEDURE — 82306 VITAMIN D 25 HYDROXY: CPT | Performed by: INTERNAL MEDICINE

## 2019-07-05 PROCEDURE — 85027 COMPLETE CBC AUTOMATED: CPT | Performed by: INTERNAL MEDICINE

## 2019-07-05 PROCEDURE — 36415 COLL VENOUS BLD VENIPUNCTURE: CPT | Performed by: INTERNAL MEDICINE

## 2019-07-05 PROCEDURE — 99214 OFFICE O/P EST MOD 30 MIN: CPT | Performed by: INTERNAL MEDICINE

## 2019-07-05 PROCEDURE — 80053 COMPREHEN METABOLIC PANEL: CPT | Performed by: INTERNAL MEDICINE

## 2019-07-05 PROCEDURE — 80061 LIPID PANEL: CPT | Performed by: INTERNAL MEDICINE

## 2019-07-05 NOTE — PROGRESS NOTES
Subjective   Adelia Morris is a 69 y.o. female.     History of Present Illness   Patient was seen for low back pain.  Is being controlled with Motrin.  She does have osteopenia of the hip and is using vitamin D.  She has a history of adenomatous polyps being follow-up by GI.  Patient did have labs drawn today.    Dictated utilizing Dragon dictation. If there are questions or for further clarification, please contact me.  The following portions of the patient's history were reviewed and updated as appropriate: allergies, current medications, past family history, past medical history, past social history, past surgical history and problem list.    Review of Systems   Constitutional: Negative for fatigue and fever.   HENT: Positive for congestion. Negative for trouble swallowing.    Eyes: Negative for discharge and visual disturbance.   Respiratory: Negative for choking and shortness of breath.    Cardiovascular: Negative for chest pain and palpitations.   Gastrointestinal: Negative for abdominal pain and blood in stool.   Endocrine: Negative.    Genitourinary: Negative for genital sores and hematuria.   Musculoskeletal: Positive for back pain. Negative for gait problem and joint swelling.   Skin: Negative for color change, pallor, rash and wound.   Allergic/Immunologic: Positive for environmental allergies. Negative for immunocompromised state.   Neurological: Negative for facial asymmetry and speech difficulty.   Psychiatric/Behavioral: Negative for hallucinations and suicidal ideas.       Objective   Physical Exam   Constitutional: She is oriented to person, place, and time. She appears well-developed and well-nourished.   HENT:   Head: Normocephalic.   Eyes: Conjunctivae are normal. Pupils are equal, round, and reactive to light.   Neck: Normal range of motion. Neck supple.   Cardiovascular: Normal rate, regular rhythm and normal heart sounds.   Pulmonary/Chest: Effort normal and breath sounds normal.   Abdominal:  Soft. Bowel sounds are normal.   Musculoskeletal: Normal range of motion. She exhibits tenderness.   Neurological: She is alert and oriented to person, place, and time.   Skin: Skin is warm and dry.   Psychiatric: She has a normal mood and affect. Her behavior is normal. Judgment and thought content normal.   Nursing note and vitals reviewed.      Assessment/Plan   Problems Addressed this Visit        Digestive    Colon polyp    Relevant Orders    CBC (No Diff)    Comprehensive Metabolic Panel    Lipid Panel    Vitamin D 25 Hydroxy       Nervous and Auditory    Acute right-sided low back pain with sciatica - Primary    Relevant Orders    CBC (No Diff)    Comprehensive Metabolic Panel    Lipid Panel    Vitamin D 25 Hydroxy       Musculoskeletal and Integument    Osteopenia of hip    Relevant Orders    CBC (No Diff)    Comprehensive Metabolic Panel    Lipid Panel    Vitamin D 25 Hydroxy      Other Visit Diagnoses     Abnormal findings on diagnostic imaging of other parts of digestive tract         Relevant Orders    Lipid Panel    Vitamin D deficiency         Relevant Orders    Vitamin D 25 Hydroxy

## 2019-08-08 ENCOUNTER — OFFICE VISIT (OUTPATIENT)
Dept: FAMILY MEDICINE CLINIC | Facility: CLINIC | Age: 69
End: 2019-08-08

## 2019-08-08 VITALS
DIASTOLIC BLOOD PRESSURE: 78 MMHG | TEMPERATURE: 98.3 F | HEART RATE: 70 BPM | BODY MASS INDEX: 30.83 KG/M2 | OXYGEN SATURATION: 97 % | WEIGHT: 196.4 LBS | HEIGHT: 67 IN | SYSTOLIC BLOOD PRESSURE: 120 MMHG

## 2019-08-08 DIAGNOSIS — M54.50 CHRONIC LOW BACK PAIN WITHOUT SCIATICA, UNSPECIFIED BACK PAIN LATERALITY: Primary | ICD-10-CM

## 2019-08-08 DIAGNOSIS — M54.6 BILATERAL THORACIC BACK PAIN, UNSPECIFIED CHRONICITY: ICD-10-CM

## 2019-08-08 DIAGNOSIS — G89.29 CHRONIC LOW BACK PAIN WITHOUT SCIATICA, UNSPECIFIED BACK PAIN LATERALITY: Primary | ICD-10-CM

## 2019-08-08 PROCEDURE — 99213 OFFICE O/P EST LOW 20 MIN: CPT | Performed by: NURSE PRACTITIONER

## 2019-08-08 NOTE — PATIENT INSTRUCTIONS
Referred to PT she will call for appt,   Cont motrin as needed for pain,  Deferred imaging today.   Increase fluid intake, get plenty of rest.   Patient agrees with plan of care and understands instructions. Call if worsening symptoms or any problems or concerns.

## 2019-08-08 NOTE — PROGRESS NOTES
Subjective   Adelia Morris is a 69 y.o. female.     History of Present Illness   C/o back pain, states pain is mid back down to low back, she has hx of chronic back pain, taking motrin as needed for pain, she had MRI lumbar in 2017 saw ortho Dr. Rubio, showed severe loss of disc space at L5-S1. States she lifted cough about 2 years ago and has had back pain since then, tried PT, she tried muscle relaxer but did not help, she has had right low back pain. 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 She needs new referral for PT. She has burning in upper back. She has had trouble sleeping d/t pain. She states epidurals did not help. States she has relief with aleve. Takes only when needed. She is UTD on labs.       The following portions of the patient's history were reviewed and updated as appropriate: allergies, current medications, past family history, past medical history, past social history, past surgical history and problem list.    Review of Systems   Constitutional: Negative for chills, diaphoresis and fever.   Respiratory: Negative for cough and shortness of breath.    Cardiovascular: Negative for chest pain.   Musculoskeletal: Positive for back pain. Negative for arthralgias and myalgias.   Neurological: Negative for dizziness, light-headedness and headache.   All other systems reviewed and are negative.      Objective   Physical Exam   Constitutional: She is oriented to person, place, and time. She appears well-developed and well-nourished.   HENT:   Head: Normocephalic.   Eyes: Pupils are equal, round, and reactive to light.   Neck: Normal range of motion.   Cardiovascular: Normal rate, regular rhythm and normal heart sounds.   Pulmonary/Chest: Effort normal and breath sounds normal.   Musculoskeletal:        Cervical back: She exhibits decreased range of motion and tenderness.        Thoracic back: She exhibits tenderness.        Lumbar back: She exhibits  tenderness.   Neurological: She is alert and oriented to person, place, and time.   Skin: Skin is warm and dry.   Psychiatric: She has a normal mood and affect. Her behavior is normal.   Nursing note and vitals reviewed.        Assessment/Plan   Adelia was seen today for back pain.    Diagnoses and all orders for this visit:    Chronic low back pain without sciatica, unspecified back pain laterality  -     Ambulatory Referral to Physical Therapy Evaluate and treat    Bilateral thoracic back pain, unspecified chronicity  -     Ambulatory Referral to Physical Therapy Evaluate and treat      Referred to PT she will call for appt,   Cont motrin as needed for pain,  Deferred imaging today.   Increase fluid intake, get plenty of rest.   Patient agrees with plan of care and understands instructions. Call if worsening symptoms or any problems or concerns.

## 2019-10-31 ENCOUNTER — TRANSCRIBE ORDERS (OUTPATIENT)
Dept: ADMINISTRATIVE | Facility: HOSPITAL | Age: 69
End: 2019-10-31

## 2019-10-31 DIAGNOSIS — Z12.39 SCREENING BREAST EXAMINATION: Primary | ICD-10-CM

## 2019-12-04 ENCOUNTER — HOSPITAL ENCOUNTER (OUTPATIENT)
Dept: MAMMOGRAPHY | Facility: HOSPITAL | Age: 69
Discharge: HOME | End: 2019-12-04
Admitting: SPECIALIST

## 2019-12-04 DIAGNOSIS — Z12.39 SCREENING BREAST EXAMINATION: ICD-10-CM

## 2019-12-04 PROCEDURE — 77063 BREAST TOMOSYNTHESIS BI: CPT

## 2019-12-04 PROCEDURE — 77067 SCR MAMMO BI INCL CAD: CPT

## 2019-12-31 ENCOUNTER — TELEPHONE (OUTPATIENT)
Dept: FAMILY MEDICINE CLINIC | Facility: CLINIC | Age: 69
End: 2019-12-31

## 2020-01-02 DIAGNOSIS — M54.2 NECK PAIN: Primary | ICD-10-CM

## 2020-01-24 ENCOUNTER — OFFICE VISIT (OUTPATIENT)
Dept: FAMILY MEDICINE CLINIC | Facility: CLINIC | Age: 70
End: 2020-01-24

## 2020-01-24 VITALS
WEIGHT: 199.6 LBS | BODY MASS INDEX: 31.33 KG/M2 | HEIGHT: 67 IN | TEMPERATURE: 98.2 F | DIASTOLIC BLOOD PRESSURE: 70 MMHG | RESPIRATION RATE: 15 BRPM | SYSTOLIC BLOOD PRESSURE: 110 MMHG | HEART RATE: 73 BPM | OXYGEN SATURATION: 97 %

## 2020-01-24 DIAGNOSIS — R79.9 ABNORMAL FINDING OF BLOOD CHEMISTRY, UNSPECIFIED: ICD-10-CM

## 2020-01-24 DIAGNOSIS — M54.50 CHRONIC LOW BACK PAIN WITHOUT SCIATICA, UNSPECIFIED BACK PAIN LATERALITY: ICD-10-CM

## 2020-01-24 DIAGNOSIS — Z00.00 MEDICARE ANNUAL WELLNESS VISIT, SUBSEQUENT: Primary | ICD-10-CM

## 2020-01-24 DIAGNOSIS — E55.9 VITAMIN D DEFICIENCY: ICD-10-CM

## 2020-01-24 DIAGNOSIS — R53.83 MALAISE AND FATIGUE: ICD-10-CM

## 2020-01-24 DIAGNOSIS — R53.81 MALAISE AND FATIGUE: ICD-10-CM

## 2020-01-24 DIAGNOSIS — G89.29 CHRONIC LOW BACK PAIN WITHOUT SCIATICA, UNSPECIFIED BACK PAIN LATERALITY: ICD-10-CM

## 2020-01-24 LAB
25(OH)D3 SERPL-MCNC: 50.9 NG/ML (ref 30–100)
ALBUMIN SERPL-MCNC: 4.4 G/DL (ref 3.5–5.2)
ALBUMIN/GLOB SERPL: 1.7 G/DL
ALP SERPL-CCNC: 63 U/L (ref 39–117)
ALT SERPL W P-5'-P-CCNC: 12 U/L (ref 1–33)
ANION GAP SERPL CALCULATED.3IONS-SCNC: 12.4 MMOL/L (ref 5–15)
AST SERPL-CCNC: 12 U/L (ref 1–32)
BILIRUB SERPL-MCNC: 0.4 MG/DL (ref 0.2–1.2)
BUN BLD-MCNC: 10 MG/DL (ref 8–23)
BUN/CREAT SERPL: 13 (ref 7–25)
CALCIUM SPEC-SCNC: 9.6 MG/DL (ref 8.6–10.5)
CHLORIDE SERPL-SCNC: 100 MMOL/L (ref 98–107)
CHOLEST SERPL-MCNC: 151 MG/DL (ref 0–200)
CO2 SERPL-SCNC: 26.6 MMOL/L (ref 22–29)
CREAT BLD-MCNC: 0.77 MG/DL (ref 0.57–1)
DEPRECATED RDW RBC AUTO: 43.4 FL (ref 37–54)
ERYTHROCYTE [DISTWIDTH] IN BLOOD BY AUTOMATED COUNT: 12.7 % (ref 12.3–15.4)
GFR SERPL CREATININE-BSD FRML MDRD: 75 ML/MIN/1.73
GLOBULIN UR ELPH-MCNC: 2.6 GM/DL
GLUCOSE BLD-MCNC: 92 MG/DL (ref 65–99)
HCT VFR BLD AUTO: 42.2 % (ref 34–46.6)
HDLC SERPL-MCNC: 53 MG/DL (ref 40–60)
HGB BLD-MCNC: 13.7 G/DL (ref 12–15.9)
LDLC SERPL CALC-MCNC: 86 MG/DL (ref 0–100)
LDLC/HDLC SERPL: 1.62 {RATIO}
MCH RBC QN AUTO: 29.9 PG (ref 26.6–33)
MCHC RBC AUTO-ENTMCNC: 32.5 G/DL (ref 31.5–35.7)
MCV RBC AUTO: 92.1 FL (ref 79–97)
PLATELET # BLD AUTO: 247 10*3/MM3 (ref 140–450)
PMV BLD AUTO: 9.5 FL (ref 6–12)
POTASSIUM BLD-SCNC: 4.7 MMOL/L (ref 3.5–5.2)
PROT SERPL-MCNC: 7 G/DL (ref 6–8.5)
RBC # BLD AUTO: 4.58 10*6/MM3 (ref 3.77–5.28)
SODIUM BLD-SCNC: 139 MMOL/L (ref 136–145)
T-UPTAKE NFR SERPL: 0.99 TBI (ref 0.8–1.3)
T4 SERPL-MCNC: 9.17 MCG/DL (ref 4.5–11.7)
TRIGL SERPL-MCNC: 62 MG/DL (ref 0–150)
TSH SERPL DL<=0.05 MIU/L-ACNC: 1.07 UIU/ML (ref 0.27–4.2)
VLDLC SERPL-MCNC: 12.4 MG/DL (ref 5–40)
WBC NRBC COR # BLD: 4.75 10*3/MM3 (ref 3.4–10.8)

## 2020-01-24 PROCEDURE — 80053 COMPREHEN METABOLIC PANEL: CPT | Performed by: INTERNAL MEDICINE

## 2020-01-24 PROCEDURE — 82306 VITAMIN D 25 HYDROXY: CPT | Performed by: INTERNAL MEDICINE

## 2020-01-24 PROCEDURE — 84436 ASSAY OF TOTAL THYROXINE: CPT | Performed by: INTERNAL MEDICINE

## 2020-01-24 PROCEDURE — G0439 PPPS, SUBSEQ VISIT: HCPCS | Performed by: INTERNAL MEDICINE

## 2020-01-24 PROCEDURE — 99214 OFFICE O/P EST MOD 30 MIN: CPT | Performed by: INTERNAL MEDICINE

## 2020-01-24 PROCEDURE — 84479 ASSAY OF THYROID (T3 OR T4): CPT | Performed by: INTERNAL MEDICINE

## 2020-01-24 PROCEDURE — 84443 ASSAY THYROID STIM HORMONE: CPT | Performed by: INTERNAL MEDICINE

## 2020-01-24 PROCEDURE — 85027 COMPLETE CBC AUTOMATED: CPT | Performed by: INTERNAL MEDICINE

## 2020-01-24 PROCEDURE — 36415 COLL VENOUS BLD VENIPUNCTURE: CPT | Performed by: INTERNAL MEDICINE

## 2020-01-24 PROCEDURE — 80061 LIPID PANEL: CPT | Performed by: INTERNAL MEDICINE

## 2020-01-24 NOTE — PROGRESS NOTES
Subsequent Medicare Wellness Visit   The ABC's of the Annual Wellness Visit    Chief Complaint   Patient presents with   • Medicare Wellness-subsequent   • Labs Only     patient had coffee       HPI:  CECE Land-1950, is a 69 y.o. female who presents for a Subsequent Medicare Wellness Visit.  Patient was seen for Medicare wellness exam.  Patient was seen for low back pain.  Patient has a history of low back pain is being treated with an NSAID.  Patient also is very active at home and is able to move her joints without severe pain.  Patient is low on vitamin D supplementing with over-the-counter D3.  Patient does have some nonspecific malaise and fatigue.  Patient's mother and sister had thyroid issues and levels were drawn today.  Labs were drawn today.    Dictated utilizing Dragon dictation. If there are questions or for further clarification, please contact me.  Recent Hospitalizations:  No hospitalization(s) within the last year..    Current Medical Providers:  Patient Care Team:  Brian Khanna MD as PCP - General (Internal Medicine)  Brian Khanna MD as PCP - Claims Attributed  Lacy Avila MD as Consulting Physician (Gynecology)  Rafael Espinoza MD as Consulting Physician (Dermatology)    Health Habits and Functional and Cognitive Screening and Depression Screening:  Functional & Cognitive Status 2020   Do you have difficulty preparing food and eating? No   Do you have difficulty bathing yourself, getting dressed or grooming yourself? No   Do you have difficulty using the toilet? No   Do you have difficulty moving around from place to place? No   Do you have trouble with steps or getting out of a bed or a chair? No   Current Diet Well Balanced Diet   Dental Exam Up to date   Eye Exam Up to date   Exercise (times per week) 2 times per week   Current Exercise Activities Include Aerobics   Do you need help using the phone?  No   Are you deaf or do you have serious difficulty hearing?   No   Do you need help with transportation? No   Do you need help shopping? No   Do you need help preparing meals?  No   Do you need help with housework?  No   Do you need help with laundry? No   Do you need help taking your medications? No   Do you need help managing money? No   Do you ever drive or ride in a car without wearing a seat belt? No   Have you felt unusual stress, anger or loneliness in the last month? No   Who do you live with? Spouse   If you need help, do you have trouble finding someone available to you? No   Have you been bothered in the last four weeks by sexual problems? No   Do you have difficulty concentrating, remembering or making decisions? No       Compared to one year ago, the patient feels her physical health is the same and her mental health is the same.    Depression Screen:  PHQ-2/PHQ-9 Depression Screening 1/24/2020   Little interest or pleasure in doing things 0   Feeling down, depressed, or hopeless 0   Trouble falling or staying asleep, or sleeping too much 0   Feeling tired or having little energy 0   Poor appetite or overeating 0   Feeling bad about yourself - or that you are a failure or have let yourself or your family down 0   Trouble concentrating on things, such as reading the newspaper or watching television 0   Moving or speaking so slowly that other people could have noticed. Or the opposite - being so fidgety or restless that you have been moving around a lot more than usual 0   Thoughts that you would be better off dead, or of hurting yourself in some way 0   Total Score 0   If you checked off any problems, how difficult have these problems made it for you to do your work, take care of things at home, or get along with other people? Not difficult at all         Past Medical/Family/Social History:  The following portions of the patient's history were reviewed and updated as appropriate: allergies, current medications, past family history, past medical history, past social  history, past surgical history and problem list.    Allergies   Allergen Reactions   • Clindamycin/Lincomycin Rash         Current Outpatient Medications:   •  azelastine (ASTELIN) 0.1 % nasal spray, 2 sprays into each nostril 2 (Two) Times a Day. Use in each nostril as directed, Disp: 1 each, Rfl: 0  •  Calcium Citrate (CITRACAL PO), Take  by mouth., Disp: , Rfl:   •  Cholecalciferol (VITAMIN D) 2000 units tablet, Take 2,000 Units by mouth Daily., Disp: , Rfl:   •  Cholecalciferol (VITAMIN D-3) 125 MCG (5000 UT) tablet, Take  by mouth Daily., Disp: , Rfl:   •  ibuprofen (ADVIL,MOTRIN) 200 MG tablet, Take 200 mg by mouth Every 6 (Six) Hours As Needed for mild pain (1-3)., Disp: , Rfl:   •  cetirizine (ZyrTEC) 10 MG tablet, Take 10 mg by mouth daily., Disp: , Rfl:   •  hydrocortisone 2.5 % cream, , Disp: , Rfl:     Aspirin use counseling: Does not need ASA (and currently is not on it)    Current medication list contains no high risk medications.  No harmful drug interactions have been identified.     Family History   Problem Relation Age of Onset   • Cancer Father         BONE MARROW   • Hypertension Sister        Social History     Tobacco Use   • Smoking status: Never Smoker   • Smokeless tobacco: Never Used   Substance Use Topics   • Alcohol use: No       Past Surgical History:   Procedure Laterality Date   •  SECTION  1993   • COLONOSCOPY N/A 2016    Procedure: COLONOSCOPY ith polypectomy (cold bx);  Surgeon: Jarrod John Jr., MD;  Location: [facility] ENDOSCOPY;  Service:    • COLONOSCOPY      WNL PER PT         Patient Active Problem List   Diagnosis   • Healthcare maintenance   • Encounter for screening colonoscopy   • Colon polyp   • Acute right-sided low back pain with sciatica   • Allergic drug rash   • Osteopenia of hip   • Cutaneous cyst       Review of Systems   Constitutional: Positive for fatigue. Negative for fever.   HENT: Positive for congestion. Negative for  "trouble swallowing.    Eyes: Negative for discharge and visual disturbance.   Respiratory: Negative for choking and shortness of breath.    Cardiovascular: Negative for chest pain and palpitations.   Gastrointestinal: Negative for abdominal pain and blood in stool.   Endocrine: Negative.    Genitourinary: Negative for genital sores and hematuria.   Musculoskeletal: Positive for arthralgias and joint swelling. Negative for gait problem.   Skin: Negative for color change, pallor, rash and wound.   Allergic/Immunologic: Positive for environmental allergies. Negative for immunocompromised state.   Neurological: Negative for facial asymmetry and speech difficulty.   Psychiatric/Behavioral: Negative for hallucinations and suicidal ideas.       Objective     Vitals:    01/24/20 0848   BP: 110/70   BP Location: Left arm   Patient Position: Sitting   Cuff Size: Large Adult   Pulse: 73   Resp: 15   Temp: 98.2 °F (36.8 °C)   TempSrc: Oral   SpO2: 97%   Weight: 90.5 kg (199 lb 9.6 oz)   Height: 170.2 cm (67.01\")   PainSc: 0-No pain       Patient's Body mass index is 31.25 kg/m². BMI is within normal parameters. No follow-up required..      No exam data present    The patient has no evidence of cognitve impairment.     Physical Exam   Constitutional: She is oriented to person, place, and time. She appears well-developed and well-nourished.   HENT:   Head: Normocephalic and atraumatic.   Right Ear: External ear normal.   Left Ear: External ear normal.   Nose: Nose normal.   Mouth/Throat: Oropharynx is clear and moist.   Eyes: Pupils are equal, round, and reactive to light. Conjunctivae and EOM are normal.   Neck: Normal range of motion. Neck supple.   Cardiovascular: Normal rate, regular rhythm, normal heart sounds and intact distal pulses.   Pulmonary/Chest: Effort normal and breath sounds normal.   Abdominal: Soft. Bowel sounds are normal.   Musculoskeletal: Normal range of motion. She exhibits tenderness. She exhibits no edema " or deformity.   Neurological: She is alert and oriented to person, place, and time.   Skin: Skin is warm and dry.   Psychiatric: She has a normal mood and affect. Her behavior is normal. Judgment and thought content normal.       Recent Lab Results:  Lab Results   Component Value Date    GLU 86 11/07/2018     Lab Results   Component Value Date    CHOL 146 07/05/2019    TRIG 56 07/05/2019    HDL 53 07/05/2019    VLDL 11.2 07/05/2019    LDLHDL 1.54 07/05/2019       Assessment/Plan   Age-appropriate Screening Schedule:  Refer to the list below for future screening recommendations based on patient's age, sex and/or medical conditions.      Health Maintenance   Topic Date Due   • TDAP/TD VACCINES (1 - Tdap) 06/16/1961   • MAMMOGRAM  12/04/2021   • COLONOSCOPY  12/09/2026   • INFLUENZA VACCINE  Completed   • ZOSTER VACCINE  Completed       Medicare Risks and Personalized Health Plan:  NA      CMS-Preventive Services Quick Reference  Medicare Preventive Services Addressed:  NA    Advance Care Planning: #1 labs #2 discontinue present activity level #3 continue present diet.  #4 monitor blood pressure at home  Patient has an advance directive - a copy has not been provided. Have asked the patient to send this to us to add to record    Diagnoses and all orders for this visit:    1. Medicare annual wellness visit, subsequent (Primary)    2. Vitamin D deficiency   -     CBC (No Diff)  -     Comprehensive Metabolic Panel  -     Lipid Panel  -     Vitamin D 25 Hydroxy    3. Chronic low back pain without sciatica, unspecified back pain laterality  -     CBC (No Diff)  -     Comprehensive Metabolic Panel  -     Lipid Panel  -     Vitamin D 25 Hydroxy    4. Abnormal finding of blood chemistry, unspecified   -     Lipid Panel    5. Malaise and fatigue  -     Thyroid Panel With TSH        An After Visit Summary and PPPS with all of these plans were given to the patient.      Follow Up:  Return in about 6 months (around 7/24/2020), or  if symptoms worsen or fail to improve, for Recheck.

## 2020-01-24 NOTE — PATIENT INSTRUCTIONS
Medicare Wellness  Personal Prevention Plan of Service     Date of Office Visit:  2020  Encounter Provider:  Brian Khanna MD  Place of Service:  [facility]  Patient Name: Adelia Morris  :  1950    As part of the Medicare Wellness portion of your visit today, we are providing you with this personalized preventive plan of services (PPPS). This plan is based upon recommendations of the [place] Preventive Services Task Force (USPSTF) and the Advisory Committee on Immunization Practices (ACIP).    This lists the preventive care services that should be considered, and provides dates of when you are due. Items listed as completed are up-to-date and do not require any further intervention.    Health Maintenance   Topic Date Due   • TDAP/TD VACCINES (1 - Tdap) 1962   • MEDICARE ANNUAL WELLNESS  2021   • MAMMOGRAM  2021   • COLONOSCOPY  2026   • HEPATITIS C SCREENING  Completed   • Pneumococcal Vaccine Once at 65 Years Old  Completed   • INFLUENZA VACCINE  Completed   • ZOSTER VACCINE  Completed       No orders of the defined types were placed in this encounter.      No follow-ups on file.

## 2020-07-24 DIAGNOSIS — M54.50 CHRONIC LOW BACK PAIN WITHOUT SCIATICA, UNSPECIFIED BACK PAIN LATERALITY: Primary | ICD-10-CM

## 2020-07-24 DIAGNOSIS — E78.5 DYSLIPIDEMIA: ICD-10-CM

## 2020-07-24 DIAGNOSIS — G89.29 CHRONIC LOW BACK PAIN WITHOUT SCIATICA, UNSPECIFIED BACK PAIN LATERALITY: Primary | ICD-10-CM

## 2020-07-24 DIAGNOSIS — E55.9 VITAMIN D DEFICIENCY: ICD-10-CM

## 2020-07-27 ENCOUNTER — LAB (OUTPATIENT)
Dept: FAMILY MEDICINE CLINIC | Facility: CLINIC | Age: 70
End: 2020-07-27

## 2020-07-27 DIAGNOSIS — E55.9 VITAMIN D DEFICIENCY: ICD-10-CM

## 2020-07-27 DIAGNOSIS — M54.50 CHRONIC LOW BACK PAIN WITHOUT SCIATICA, UNSPECIFIED BACK PAIN LATERALITY: ICD-10-CM

## 2020-07-27 DIAGNOSIS — E78.5 DYSLIPIDEMIA: ICD-10-CM

## 2020-07-27 DIAGNOSIS — G89.29 CHRONIC LOW BACK PAIN WITHOUT SCIATICA, UNSPECIFIED BACK PAIN LATERALITY: ICD-10-CM

## 2020-07-27 LAB
25(OH)D3 SERPL-MCNC: 38.7 NG/ML (ref 30–100)
ALBUMIN SERPL-MCNC: 4.2 G/DL (ref 3.5–5.2)
ALBUMIN/GLOB SERPL: 1.4 G/DL
ALP SERPL-CCNC: 54 U/L (ref 39–117)
ALT SERPL W P-5'-P-CCNC: 12 U/L (ref 1–33)
ANION GAP SERPL CALCULATED.3IONS-SCNC: 8.8 MMOL/L (ref 5–15)
AST SERPL-CCNC: 13 U/L (ref 1–32)
BILIRUB SERPL-MCNC: 0.5 MG/DL (ref 0–1.2)
BUN SERPL-MCNC: 9 MG/DL (ref 8–23)
BUN/CREAT SERPL: 11.1 (ref 7–25)
CALCIUM SPEC-SCNC: 9.8 MG/DL (ref 8.6–10.5)
CHLORIDE SERPL-SCNC: 107 MMOL/L (ref 98–107)
CHOLEST SERPL-MCNC: 150 MG/DL (ref 0–200)
CO2 SERPL-SCNC: 26.2 MMOL/L (ref 22–29)
CREAT SERPL-MCNC: 0.81 MG/DL (ref 0.57–1)
DEPRECATED RDW RBC AUTO: 44.7 FL (ref 37–54)
ERYTHROCYTE [DISTWIDTH] IN BLOOD BY AUTOMATED COUNT: 13 % (ref 12.3–15.4)
GFR SERPL CREATININE-BSD FRML MDRD: 70 ML/MIN/1.73
GLOBULIN UR ELPH-MCNC: 3.1 GM/DL
GLUCOSE SERPL-MCNC: 98 MG/DL (ref 65–99)
HCT VFR BLD AUTO: 43.3 % (ref 34–46.6)
HDLC SERPL-MCNC: 49 MG/DL (ref 40–60)
HGB BLD-MCNC: 14.2 G/DL (ref 12–15.9)
LDLC SERPL CALC-MCNC: 87 MG/DL (ref 0–100)
LDLC/HDLC SERPL: 1.78 {RATIO}
MCH RBC QN AUTO: 30.7 PG (ref 26.6–33)
MCHC RBC AUTO-ENTMCNC: 32.8 G/DL (ref 31.5–35.7)
MCV RBC AUTO: 93.7 FL (ref 79–97)
PLATELET # BLD AUTO: 241 10*3/MM3 (ref 140–450)
PMV BLD AUTO: 9.5 FL (ref 6–12)
POTASSIUM SERPL-SCNC: 4.5 MMOL/L (ref 3.5–5.2)
PROT SERPL-MCNC: 7.3 G/DL (ref 6–8.5)
RBC # BLD AUTO: 4.62 10*6/MM3 (ref 3.77–5.28)
SODIUM SERPL-SCNC: 142 MMOL/L (ref 136–145)
TRIGL SERPL-MCNC: 70 MG/DL (ref 0–150)
VLDLC SERPL-MCNC: 14 MG/DL (ref 5–40)
WBC # BLD AUTO: 4.94 10*3/MM3 (ref 3.4–10.8)

## 2020-07-27 PROCEDURE — 80061 LIPID PANEL: CPT | Performed by: INTERNAL MEDICINE

## 2020-07-27 PROCEDURE — 36415 COLL VENOUS BLD VENIPUNCTURE: CPT | Performed by: INTERNAL MEDICINE

## 2020-07-27 PROCEDURE — 80053 COMPREHEN METABOLIC PANEL: CPT | Performed by: INTERNAL MEDICINE

## 2020-07-27 PROCEDURE — 85027 COMPLETE CBC AUTOMATED: CPT | Performed by: INTERNAL MEDICINE

## 2020-07-27 PROCEDURE — 82306 VITAMIN D 25 HYDROXY: CPT | Performed by: INTERNAL MEDICINE

## 2020-07-31 ENCOUNTER — OFFICE VISIT (OUTPATIENT)
Dept: FAMILY MEDICINE CLINIC | Facility: CLINIC | Age: 70
End: 2020-07-31

## 2020-07-31 VITALS
DIASTOLIC BLOOD PRESSURE: 72 MMHG | HEART RATE: 80 BPM | SYSTOLIC BLOOD PRESSURE: 120 MMHG | WEIGHT: 198 LBS | HEIGHT: 67 IN | OXYGEN SATURATION: 96 % | TEMPERATURE: 97.8 F | BODY MASS INDEX: 31.08 KG/M2

## 2020-07-31 DIAGNOSIS — M54.40 ACUTE RIGHT-SIDED LOW BACK PAIN WITH SCIATICA, SCIATICA LATERALITY UNSPECIFIED: Primary | ICD-10-CM

## 2020-07-31 DIAGNOSIS — L27.0 ALLERGIC DRUG RASH: ICD-10-CM

## 2020-07-31 DIAGNOSIS — D12.2 ADENOMATOUS POLYP OF ASCENDING COLON: ICD-10-CM

## 2020-07-31 PROCEDURE — 99213 OFFICE O/P EST LOW 20 MIN: CPT | Performed by: INTERNAL MEDICINE

## 2020-07-31 NOTE — PROGRESS NOTES
Subjective   Adelia Morris is a 70 y.o. female.     History of Present Illness   Patient was seen for back pain.  Pain is greatly improved and she takes ibuprofen as necessary.  Patient also had a allergic drug rash which resolved with medication.  Patient also has a history of colonic polyps and is being followed by gastroenterology.  Triglycerides 70, HDL 49, LDL 87, blood sugar 98.  Patient will continue present diet and activity levels and follow-up in 6 months.    Dictated utilizing Dragon dictation. If there are questions or for further clarification, please contact me.  The following portions of the patient's history were reviewed and updated as appropriate: allergies, current medications, past family history, past medical history, past social history, past surgical history and problem list.    Review of Systems   Constitutional: Negative for fatigue and fever.   HENT: Positive for congestion. Negative for trouble swallowing.    Eyes: Negative for discharge and visual disturbance.   Respiratory: Negative for choking and shortness of breath.    Cardiovascular: Negative for chest pain and palpitations.   Gastrointestinal: Negative for abdominal pain and blood in stool.   Endocrine: Negative.    Genitourinary: Negative for genital sores and hematuria.   Musculoskeletal: Positive for back pain. Negative for gait problem and joint swelling.   Skin: Negative for color change, pallor, rash and wound.   Allergic/Immunologic: Positive for environmental allergies. Negative for immunocompromised state.   Neurological: Negative for facial asymmetry and speech difficulty.   Psychiatric/Behavioral: Negative for hallucinations and suicidal ideas.       Objective   Physical Exam   Constitutional: She is oriented to person, place, and time. She appears well-developed and well-nourished.   HENT:   Head: Normocephalic and atraumatic.   Eyes: Pupils are equal, round, and reactive to light. Conjunctivae and EOM are normal.    Neck: Normal range of motion. Neck supple.   Cardiovascular: Normal rate, regular rhythm and normal heart sounds. Exam reveals no gallop and no friction rub.   No murmur heard.  Pulmonary/Chest: Effort normal and breath sounds normal. No stridor. No respiratory distress. She has no wheezes. She has no rales. She exhibits no tenderness.   Abdominal: Soft. Bowel sounds are normal.   Musculoskeletal: Normal range of motion.   Neurological: She is alert and oriented to person, place, and time.   Skin: Skin is warm and dry.   Psychiatric: She has a normal mood and affect. Her behavior is normal. Judgment and thought content normal.   Nursing note and vitals reviewed.      Assessment/Plan #1 continue present diet and activity levels #2 follow-up in 6 months  Adelia was seen today for follow-up.    Diagnoses and all orders for this visit:    Acute right-sided low back pain with sciatica, sciatica laterality unspecified    Allergic drug rash    Adenomatous polyp of ascending colon

## 2020-09-21 ENCOUNTER — OFFICE VISIT (OUTPATIENT)
Dept: FAMILY MEDICINE CLINIC | Facility: CLINIC | Age: 70
End: 2020-09-21

## 2020-09-21 VITALS
SYSTOLIC BLOOD PRESSURE: 128 MMHG | BODY MASS INDEX: 31.71 KG/M2 | OXYGEN SATURATION: 98 % | HEART RATE: 79 BPM | WEIGHT: 202 LBS | TEMPERATURE: 98 F | HEIGHT: 67 IN | DIASTOLIC BLOOD PRESSURE: 76 MMHG

## 2020-09-21 DIAGNOSIS — N30.00 ACUTE CYSTITIS WITHOUT HEMATURIA: ICD-10-CM

## 2020-09-21 DIAGNOSIS — R30.0 DYSURIA: Primary | ICD-10-CM

## 2020-09-21 LAB
BACTERIA UR QL AUTO: ABNORMAL /HPF
BILIRUB UR QL STRIP: NEGATIVE
CLARITY UR: CLEAR
COLOR UR: YELLOW
GLUCOSE UR STRIP-MCNC: NEGATIVE MG/DL
HGB UR QL STRIP.AUTO: ABNORMAL
KETONES UR QL STRIP: NEGATIVE
LEUKOCYTE ESTERASE UR QL STRIP.AUTO: ABNORMAL
NITRITE UR QL STRIP: NEGATIVE
PH UR STRIP.AUTO: 5.5 [PH] (ref 4.6–8)
PROT UR QL STRIP: NEGATIVE
RBC # UR: ABNORMAL /HPF
REF LAB TEST METHOD: ABNORMAL
SP GR UR STRIP: 1.01 (ref 1–1.03)
SQUAMOUS #/AREA URNS HPF: ABNORMAL /HPF
UROBILINOGEN UR QL STRIP: ABNORMAL
WBC UR QL AUTO: ABNORMAL /HPF

## 2020-09-21 PROCEDURE — 81001 URINALYSIS AUTO W/SCOPE: CPT | Performed by: NURSE PRACTITIONER

## 2020-09-21 PROCEDURE — 99213 OFFICE O/P EST LOW 20 MIN: CPT | Performed by: NURSE PRACTITIONER

## 2020-09-21 RX ORDER — FLUCONAZOLE 150 MG/1
150 TABLET ORAL ONCE
Qty: 1 TABLET | Refills: 0 | Status: SHIPPED | OUTPATIENT
Start: 2020-09-21 | End: 2020-09-21

## 2020-09-21 RX ORDER — CIPROFLOXACIN 500 MG/1
500 TABLET, FILM COATED ORAL 2 TIMES DAILY
Qty: 14 TABLET | Refills: 0 | Status: SHIPPED | OUTPATIENT
Start: 2020-09-21 | End: 2020-10-02

## 2020-09-21 NOTE — PATIENT INSTRUCTIONS
UA today,   Start cipro 500mg bid for 7 days.   Diflucan 150mg x1 today,   Drink plenty of H2O, wipe front to back after urination.   Avoid bladder irritants- coffee, caffeine, carbonation.  If symptoms persist call office.   Increase fluid intake, get plenty of rest.   Patient agrees with plan of care and understands instructions. Call if worsening symptoms or any problems or concerns.

## 2020-09-21 NOTE — PROGRESS NOTES
Subjective   Adelia Morris is a 70 y.o. female.     History of Present Illness   C/o low back pain, dysuria, urinary frequency, she states symptoms started about 1 week ago, she does have hx of back pain, has been doing yard work, does have vaginal irritation, redness. She denies vaginal d/c, she denies odor, she did notice strong odor in urine she denies fever. States she does drink a lot of water, tried to limit soda, she did try azo OTC which helped.           The following portions of the patient's history were reviewed and updated as appropriate: allergies, current medications, past family history, past medical history, past social history, past surgical history and problem list.    Review of Systems   Constitutional: Negative for chills, diaphoresis and fever.   Respiratory: Negative for cough and shortness of breath.    Cardiovascular: Negative for chest pain.   Genitourinary: Positive for decreased urine volume, dysuria and frequency. Negative for urinary incontinence, vaginal bleeding and vaginal discharge.   Musculoskeletal: Negative for arthralgias and myalgias.   Neurological: Negative for dizziness, light-headedness and headache.   All other systems reviewed and are negative.      Objective   Physical Exam  Vitals signs and nursing note reviewed.   Constitutional:       Appearance: She is well-developed.   HENT:      Head: Normocephalic.   Eyes:      Pupils: Pupils are equal, round, and reactive to light.   Cardiovascular:      Rate and Rhythm: Normal rate and regular rhythm.      Heart sounds: Normal heart sounds.   Pulmonary:      Effort: Pulmonary effort is normal.      Breath sounds: Normal breath sounds.   Abdominal:      Tenderness: There is no right CVA tenderness or left CVA tenderness.   Skin:     General: Skin is warm and dry.   Neurological:      Mental Status: She is alert and oriented to person, place, and time.   Psychiatric:         Behavior: Behavior normal.         Judgment: Judgment  normal.           Assessment/Plan   Adelia was seen today for urinary tract infection and back pain.    Diagnoses and all orders for this visit:    Dysuria  -     Urinalysis With Microscopic - Urine, Clean Catch  -     Urinalysis without microscopic (no culture) - Urine, Clean Catch  -     Urinalysis, Microscopic Only - Urine, Clean Catch    Acute cystitis without hematuria    Other orders  -     fluconazole (Diflucan) 150 MG tablet; Take 1 tablet by mouth 1 (One) Time for 1 dose.  -     ciprofloxacin (Cipro) 500 MG tablet; Take 1 tablet by mouth 2 (Two) Times a Day.      UA today,   Start cipro 500mg bid for 7 days.   Diflucan 150mg x1 today,   Drink plenty of H2O, wipe front to back after urination.   Avoid bladder irritants- coffee, caffeine, carbonation.  If symptoms persist call office.   Increase fluid intake, get plenty of rest.   Patient agrees with plan of care and understands instructions. Call if worsening symptoms or any problems or concerns.

## 2020-10-02 ENCOUNTER — TELEPHONE (OUTPATIENT)
Dept: FAMILY MEDICINE CLINIC | Facility: CLINIC | Age: 70
End: 2020-10-02

## 2020-10-02 ENCOUNTER — OFFICE VISIT (OUTPATIENT)
Dept: FAMILY MEDICINE CLINIC | Facility: CLINIC | Age: 70
End: 2020-10-02

## 2020-10-02 VITALS
TEMPERATURE: 98.2 F | HEART RATE: 75 BPM | WEIGHT: 200 LBS | OXYGEN SATURATION: 98 % | SYSTOLIC BLOOD PRESSURE: 120 MMHG | DIASTOLIC BLOOD PRESSURE: 78 MMHG | HEIGHT: 67 IN | BODY MASS INDEX: 31.39 KG/M2

## 2020-10-02 DIAGNOSIS — J01.00 ACUTE MAXILLARY SINUSITIS, RECURRENCE NOT SPECIFIED: Primary | ICD-10-CM

## 2020-10-02 PROCEDURE — 99213 OFFICE O/P EST LOW 20 MIN: CPT | Performed by: NURSE PRACTITIONER

## 2020-10-02 RX ORDER — FLUCONAZOLE 150 MG/1
TABLET ORAL
Qty: 2 TABLET | Refills: 0 | Status: SHIPPED | OUTPATIENT
Start: 2020-10-02 | End: 2021-02-03

## 2020-10-02 RX ORDER — FLUTICASONE PROPIONATE 50 MCG
2 SPRAY, SUSPENSION (ML) NASAL DAILY
Qty: 1 BOTTLE | Refills: 12 | Status: SHIPPED | OUTPATIENT
Start: 2020-10-02 | End: 2021-10-22

## 2020-10-02 RX ORDER — AMOXICILLIN 875 MG/1
875 TABLET, COATED ORAL 2 TIMES DAILY
Qty: 14 TABLET | Refills: 0 | Status: SHIPPED | OUTPATIENT
Start: 2020-10-02 | End: 2020-10-09

## 2020-10-02 NOTE — PATIENT INSTRUCTIONS
Start amoxicillin 875mg bid for 7 days.   Stop nasonex, trial flonase 2 sprays each nostril daily as needed for drainage, cont zyrtec daily,   Cautioned use of afrin > 2 days.   If symptoms persist 5-7 days call office.   Increase fluid intake, get plenty of rest.   Patient agrees with plan of care and understands instructions. Call if worsening symptoms or any problems or concerns.

## 2020-10-02 NOTE — PROGRESS NOTES
Subjective   Adelia Morris is a 70 y.o. female.     History of Present Illness   C/o sinus pressure, ear pain, swollen glands, scratchy throat, dizziness, states started about 2 weeks ago, she has bilat ear pressure, dizziness with bending over. She does have vertigo at times. Denies fever. Denies denies cough, denies diarrhea, she prev saw ENT for sinus, using nasonex and afrin, helps some, she does use zyrtec daily. Also tried advil sinus.     The following portions of the patient's history were reviewed and updated as appropriate: allergies, current medications, past family history, past medical history, past social history, past surgical history and problem list.    Review of Systems   Constitutional: Negative for chills, diaphoresis and fever.   HENT: Positive for congestion, ear pain, postnasal drip, rhinorrhea, sinus pressure, sore throat and swollen glands.    Respiratory: Negative for cough and shortness of breath.    Cardiovascular: Negative for chest pain.   Musculoskeletal: Negative for arthralgias and myalgias.   Allergic/Immunologic: Positive for environmental allergies.   Neurological: Negative for dizziness, light-headedness and headache.   All other systems reviewed and are negative.      Objective   Physical Exam  Vitals signs and nursing note reviewed.   Constitutional:       Appearance: She is well-developed.   HENT:      Head: Normocephalic.      Right Ear: Ear canal normal. A middle ear effusion is present.      Left Ear: Ear canal normal. A middle ear effusion is present.      Nose:      Right Sinus: Maxillary sinus tenderness present.      Left Sinus: Maxillary sinus tenderness present.      Mouth/Throat:      Lips: Pink.      Mouth: Mucous membranes are moist.      Pharynx: Posterior oropharyngeal erythema present.   Eyes:      Pupils: Pupils are equal, round, and reactive to light.   Cardiovascular:      Rate and Rhythm: Normal rate and regular rhythm.      Heart sounds: Normal heart  sounds.   Pulmonary:      Effort: Pulmonary effort is normal.      Breath sounds: Normal breath sounds.   Skin:     General: Skin is warm and dry.   Neurological:      Mental Status: She is alert and oriented to person, place, and time.   Psychiatric:         Behavior: Behavior normal.         Judgment: Judgment normal.           Assessment/Plan   Adelia was seen today for sinusitis, earache, adenopathy, scratchy throat and dizziness.    Diagnoses and all orders for this visit:    Acute maxillary sinusitis, recurrence not specified    Other orders  -     fluticasone (FLONASE) 50 MCG/ACT nasal spray; 2 sprays into the nostril(s) as directed by provider Daily.  -     amoxicillin (AMOXIL) 875 MG tablet; Take 1 tablet by mouth 2 (Two) Times a Day for 7 days.  -     fluconazole (Diflucan) 150 MG tablet; Take 1 tablet today then repeat again in 3 days.        Start amoxicillin 875mg bid for 7 days.   Stop nasonex, trial flonase 2 sprays each nostril daily as needed for drainage, cont zyrtec daily,   Cautioned use of afrin > 2 days.   If symptoms persist 5-7 days call office.   Increase fluid intake, get plenty of rest.   Patient agrees with plan of care and understands instructions. Call if worsening symptoms or any problems or concerns.

## 2020-11-04 ENCOUNTER — TRANSCRIBE ORDERS (OUTPATIENT)
Dept: ADMINISTRATIVE | Facility: HOSPITAL | Age: 70
End: 2020-11-04

## 2020-11-04 DIAGNOSIS — Z12.31 VISIT FOR SCREENING MAMMOGRAM: Primary | ICD-10-CM

## 2021-01-13 ENCOUNTER — HOSPITAL ENCOUNTER (OUTPATIENT)
Dept: MAMMOGRAPHY | Facility: HOSPITAL | Age: 71
Discharge: HOME | End: 2021-01-13
Admitting: SPECIALIST

## 2021-01-13 DIAGNOSIS — Z12.31 VISIT FOR SCREENING MAMMOGRAM: ICD-10-CM

## 2021-01-13 PROCEDURE — 77067 SCR MAMMO BI INCL CAD: CPT

## 2021-01-13 PROCEDURE — 77063 BREAST TOMOSYNTHESIS BI: CPT

## 2021-02-03 ENCOUNTER — OFFICE VISIT (OUTPATIENT)
Dept: FAMILY MEDICINE CLINIC | Facility: CLINIC | Age: 71
End: 2021-02-03

## 2021-02-03 VITALS
HEART RATE: 62 BPM | WEIGHT: 203.6 LBS | OXYGEN SATURATION: 100 % | DIASTOLIC BLOOD PRESSURE: 82 MMHG | HEIGHT: 67 IN | SYSTOLIC BLOOD PRESSURE: 124 MMHG | TEMPERATURE: 99.6 F | BODY MASS INDEX: 31.96 KG/M2

## 2021-02-03 DIAGNOSIS — D12.2 ADENOMATOUS POLYP OF ASCENDING COLON: ICD-10-CM

## 2021-02-03 DIAGNOSIS — Z00.00 MEDICARE ANNUAL WELLNESS VISIT, SUBSEQUENT: Primary | ICD-10-CM

## 2021-02-03 DIAGNOSIS — M54.40 ACUTE RIGHT-SIDED LOW BACK PAIN WITH SCIATICA, SCIATICA LATERALITY UNSPECIFIED: ICD-10-CM

## 2021-02-03 DIAGNOSIS — J01.00 SUBACUTE MAXILLARY SINUSITIS: ICD-10-CM

## 2021-02-03 DIAGNOSIS — R79.9 ABNORMAL FINDING OF BLOOD CHEMISTRY, UNSPECIFIED: ICD-10-CM

## 2021-02-03 LAB
25(OH)D3 SERPL-MCNC: 43.6 NG/ML (ref 30–100)
ALBUMIN SERPL-MCNC: 4.6 G/DL (ref 3.5–5.2)
ALBUMIN/GLOB SERPL: 1.9 G/DL
ALP SERPL-CCNC: 68 U/L (ref 39–117)
ALT SERPL W P-5'-P-CCNC: 10 U/L (ref 1–33)
ANION GAP SERPL CALCULATED.3IONS-SCNC: 10 MMOL/L (ref 5–15)
AST SERPL-CCNC: 12 U/L (ref 1–32)
BILIRUB SERPL-MCNC: 0.4 MG/DL (ref 0–1.2)
BUN SERPL-MCNC: 10 MG/DL (ref 8–23)
BUN/CREAT SERPL: 13.3 (ref 7–25)
CALCIUM SPEC-SCNC: 10 MG/DL (ref 8.6–10.5)
CHLORIDE SERPL-SCNC: 105 MMOL/L (ref 98–107)
CHOLEST SERPL-MCNC: 158 MG/DL (ref 0–200)
CO2 SERPL-SCNC: 28 MMOL/L (ref 22–29)
CREAT SERPL-MCNC: 0.75 MG/DL (ref 0.57–1)
DEPRECATED RDW RBC AUTO: 43.1 FL (ref 37–54)
ERYTHROCYTE [DISTWIDTH] IN BLOOD BY AUTOMATED COUNT: 12.5 % (ref 12.3–15.4)
GFR SERPL CREATININE-BSD FRML MDRD: 77 ML/MIN/1.73
GLOBULIN UR ELPH-MCNC: 2.4 GM/DL
GLUCOSE SERPL-MCNC: 91 MG/DL (ref 65–99)
HCT VFR BLD AUTO: 43.8 % (ref 34–46.6)
HDLC SERPL-MCNC: 51 MG/DL (ref 40–60)
HGB BLD-MCNC: 14 G/DL (ref 12–15.9)
LDLC SERPL CALC-MCNC: 92 MG/DL (ref 0–100)
LDLC/HDLC SERPL: 1.8 {RATIO}
MCH RBC QN AUTO: 29.7 PG (ref 26.6–33)
MCHC RBC AUTO-ENTMCNC: 32 G/DL (ref 31.5–35.7)
MCV RBC AUTO: 92.8 FL (ref 79–97)
PLATELET # BLD AUTO: 268 10*3/MM3 (ref 140–450)
PMV BLD AUTO: 9.5 FL (ref 6–12)
POTASSIUM SERPL-SCNC: 4.9 MMOL/L (ref 3.5–5.2)
PROT SERPL-MCNC: 7 G/DL (ref 6–8.5)
RBC # BLD AUTO: 4.72 10*6/MM3 (ref 3.77–5.28)
SODIUM SERPL-SCNC: 143 MMOL/L (ref 136–145)
TRIGL SERPL-MCNC: 76 MG/DL (ref 0–150)
VLDLC SERPL-MCNC: 15 MG/DL (ref 5–40)
WBC # BLD AUTO: 5.49 10*3/MM3 (ref 3.4–10.8)

## 2021-02-03 PROCEDURE — 36415 COLL VENOUS BLD VENIPUNCTURE: CPT | Performed by: INTERNAL MEDICINE

## 2021-02-03 PROCEDURE — 80053 COMPREHEN METABOLIC PANEL: CPT | Performed by: INTERNAL MEDICINE

## 2021-02-03 PROCEDURE — G0439 PPPS, SUBSEQ VISIT: HCPCS | Performed by: INTERNAL MEDICINE

## 2021-02-03 PROCEDURE — 99214 OFFICE O/P EST MOD 30 MIN: CPT | Performed by: INTERNAL MEDICINE

## 2021-02-03 PROCEDURE — 80061 LIPID PANEL: CPT | Performed by: INTERNAL MEDICINE

## 2021-02-03 PROCEDURE — 82306 VITAMIN D 25 HYDROXY: CPT | Performed by: INTERNAL MEDICINE

## 2021-02-03 PROCEDURE — 85027 COMPLETE CBC AUTOMATED: CPT | Performed by: INTERNAL MEDICINE

## 2021-02-03 RX ORDER — FLUCONAZOLE 150 MG/1
TABLET ORAL
Qty: 2 TABLET | Refills: 0 | Status: SHIPPED | OUTPATIENT
Start: 2021-02-03 | End: 2021-02-23 | Stop reason: SDUPTHER

## 2021-02-03 RX ORDER — AMOXICILLIN 875 MG/1
875 TABLET, COATED ORAL 2 TIMES DAILY
Qty: 20 TABLET | Refills: 0 | Status: SHIPPED | OUTPATIENT
Start: 2021-02-03 | End: 2021-05-25

## 2021-02-03 NOTE — PROGRESS NOTES
The ABCs of the Annual Wellness Visit  Subsequent Medicare Wellness Visit    Chief Complaint   Patient presents with   • Medicare Wellness-subsequent   • Sinus Problem       Subjective   History of Present Illness:  Adelia Morris is a 70 y.o. female who presents for a Subsequent Medicare Wellness Visit.  Patient was seen for Medicare wellness exam.  Patient was seen for low back pain.  Patient has lower lumbar back pain and is using ibuprofen 200 mg every 6 hours as needed.  Patient is also using Tylenol 2 p.o. every 8 hours as needed.  Patient's pain for felt better and she is doing low impact exercise.  Patient will continue present treatment.  Patient did have a colonoscopy which showed adenomatous polyps.  Patient was instructed to follow-up in 5 years.  Pathology reports indicated noncancerous polyps.  Patient did have symptoms of an upper respiratory tract infection.  Patient has extensive history of sinusitis.  Patient was placed on Amoxil 875 mg p.o. twice daily.  Patient was placed on Diflucan 150 mg as needed yeast.    Dictated utilizing Dragon dictation. If there are questions or for further clarification, please contact me.  HEALTH RISK ASSESSMENT    Recent Hospitalizations:  No hospitalization(s) within the last year.    Current Medical Providers:  Patient Care Team:  Brian Khanna MD as PCP - General (Internal Medicine)  Lacy Avila MD as Consulting Physician (Gynecology)  Rafael Espinoza MD as Consulting Physician (Dermatology)    Smoking Status:  Social History     Tobacco Use   Smoking Status Never Smoker   Smokeless Tobacco Never Used       Alcohol Consumption:  Social History     Substance and Sexual Activity   Alcohol Use No       Depression Screen:   PHQ-2/PHQ-9 Depression Screening 2/3/2021   Little interest or pleasure in doing things 0   Feeling down, depressed, or hopeless 0   Trouble falling or staying asleep, or sleeping too much 0   Feeling tired or having little energy 0   Poor  appetite or overeating 0   Feeling bad about yourself - or that you are a failure or have let yourself or your family down 0   Trouble concentrating on things, such as reading the newspaper or watching television 0   Moving or speaking so slowly that other people could have noticed. Or the opposite - being so fidgety or restless that you have been moving around a lot more than usual 0   Thoughts that you would be better off dead, or of hurting yourself in some way 0   Total Score 0   If you checked off any problems, how difficult have these problems made it for you to do your work, take care of things at home, or get along with other people? Not difficult at all       Fall Risk Screen:  [facility] Fall Risk Assessment was completed, and patient is at LOW risk for falls.Assessment completed on:2/3/2021    Health Habits and Functional and Cognitive Screening:  Functional & Cognitive Status 2/3/2021   Do you have difficulty preparing food and eating? No   Do you have difficulty bathing yourself, getting dressed or grooming yourself? No   Do you have difficulty using the toilet? No   Do you have difficulty moving around from place to place? No   Do you have trouble with steps or getting out of a bed or a chair? No   Current Diet Well Balanced Diet   Dental Exam Up to date   Eye Exam Up to date   Exercise (times per week) 4 times per week   Current Exercises Include Walking   Current Exercise Activities Include -   Do you need help using the phone?  No   Are you deaf or do you have serious difficulty hearing?  Yes   Do you need help with transportation? No   Do you need help shopping? No   Do you need help preparing meals?  No   Do you need help with housework?  No   Do you need help with laundry? No   Do you need help taking your medications? No   Do you need help managing money? No   Do you ever drive or ride in a car without wearing a seat belt? No   Have you felt unusual stress, anger or loneliness in the last month? No    Who do you live with? Spouse   If you need help, do you have trouble finding someone available to you? No   Have you been bothered in the last four weeks by sexual problems? No   Do you have difficulty concentrating, remembering or making decisions? No         Does the patient have evidence of cognitive impairment? No    Asprin use counseling:Does not need ASA (and currently is not on it)    Age-appropriate Screening Schedule:  Refer to the list below for future screening recommendations based on patient's age, sex and/or medical conditions. Orders for these recommended tests are listed in the plan section. The patient has been provided with a written plan.    Health Maintenance   Topic Date Due   • TDAP/TD VACCINES (1 - Tdap) 06/16/1970   • PAP SMEAR  09/16/2016   • COLONOSCOPY  12/09/2021   • MAMMOGRAM  01/13/2023   • INFLUENZA VACCINE  Completed   • ZOSTER VACCINE  Completed          The following portions of the patient's history were reviewed and updated as appropriate: past family history, past medical history, past social history, past surgical history and problem list.    Outpatient Medications Prior to Visit   Medication Sig Dispense Refill   • Acetaminophen (TYLENOL 8 HOUR PO) Take  by mouth As Needed.     • Calcium Citrate (CITRACAL PO) Take  by mouth.     • cetirizine (ZyrTEC) 10 MG tablet Take 10 mg by mouth daily.     • Cholecalciferol (VITAMIN D-3) 125 MCG (5000 UT) tablet Take  by mouth Daily.     • fluticasone (FLONASE) 50 MCG/ACT nasal spray 2 sprays into the nostril(s) as directed by provider Daily. 1 bottle 12   • ibuprofen (ADVIL,MOTRIN) 200 MG tablet Take 200 mg by mouth Every 6 (Six) Hours As Needed for mild pain (1-3).     • fluconazole (Diflucan) 150 MG tablet Take 1 tablet today then repeat again in 3 days. 2 tablet 0     No facility-administered medications prior to visit.        Patient Active Problem List   Diagnosis   • Healthcare maintenance   • Encounter for screening colonoscopy  "  • Colon polyp   • Acute right-sided low back pain with sciatica   • Allergic drug rash   • Osteopenia of hip   • Cutaneous cyst       Advanced Care Planning:  ACP discussion was held with the patient during this visit. Patient has an advance directive in EMR which is still valid.     Review of Systems   Constitutional: Negative for fatigue and fever.   HENT: Positive for congestion and sinus pain. Negative for trouble swallowing.    Eyes: Negative for discharge and visual disturbance.   Respiratory: Negative for choking and shortness of breath.    Cardiovascular: Negative for chest pain and palpitations.   Gastrointestinal: Negative for abdominal pain and blood in stool.   Endocrine: Negative.    Genitourinary: Negative for genital sores and hematuria.   Musculoskeletal: Positive for back pain. Negative for gait problem and joint swelling.   Skin: Negative for color change, pallor, rash and wound.   Allergic/Immunologic: Positive for environmental allergies. Negative for immunocompromised state.   Neurological: Negative for facial asymmetry and speech difficulty.   Psychiatric/Behavioral: Negative for hallucinations and suicidal ideas.       Compared to one year ago, the patient feels her physical health is better.  Compared to one year ago, the patient feels her mental health is the same.    Reviewed chart for potential of high risk medication in the elderly: yes  Reviewed chart for potential of harmful drug interactions in the elderly:yes    Objective         Vitals:    02/03/21 0911   BP: 124/82   Pulse: 62   Temp: 99.6 °F (37.6 °C)   SpO2: 100%   Weight: 92.4 kg (203 lb 9.6 oz)   Height: 170.2 cm (67\")       Body mass index is 31.89 kg/m².  Discussed the patient's BMI with her. The BMI is in the acceptable range.    Physical Exam  Vitals signs and nursing note reviewed.   Constitutional:       Appearance: Normal appearance. She is well-developed.   HENT:      Head: Normocephalic and atraumatic.      Comments: " Maxillary tenderness     Nose: Nose normal.      Mouth/Throat:      Mouth: Mucous membranes are moist.      Pharynx: Oropharynx is clear.   Eyes:      Extraocular Movements: Extraocular movements intact.      Conjunctiva/sclera: Conjunctivae normal.      Pupils: Pupils are equal, round, and reactive to light.   Neck:      Musculoskeletal: Normal range of motion and neck supple.   Cardiovascular:      Rate and Rhythm: Normal rate and regular rhythm.      Heart sounds: Normal heart sounds. No murmur. No friction rub. No gallop.    Pulmonary:      Effort: Pulmonary effort is normal. No respiratory distress.      Breath sounds: Normal breath sounds. No stridor. No wheezing, rhonchi or rales.   Chest:      Chest wall: No tenderness.   Abdominal:      General: Bowel sounds are normal.      Palpations: Abdomen is soft.   Musculoskeletal: Normal range of motion.         General: Swelling and tenderness present.   Skin:     General: Skin is warm and dry.   Neurological:      General: No focal deficit present.      Mental Status: She is alert and oriented to person, place, and time. Mental status is at baseline.   Psychiatric:         Mood and Affect: Mood normal.         Behavior: Behavior normal.         Thought Content: Thought content normal.         Judgment: Judgment normal.         Lab Results   Component Value Date    TRIG 76 02/03/2021    HDL 51 02/03/2021    LDL 92 02/03/2021    VLDL 15 02/03/2021        Assessment/Plan #1 Amoxil 875 p.o. twice daily, Diflucan 150 mg as needed yeast #2 colonoscopy 5 years #3 labs  Medicare Risks and Personalized Health Plan  CMS Preventative Services Quick Reference  Advance Directive Discussion  Dementia/Memory   Depression/Dysphoria  Hearing Problem    The above risks/problems have been discussed with the patient.  Pertinent information has been shared with the patient in the After Visit Summary.  Follow up plans and orders are seen below in the Assessment/Plan  Section.    Diagnoses and all orders for this visit:    1. Medicare annual wellness visit, subsequent (Primary)    2. Adenomatous polyp of ascending colon  -     CBC (No Diff); Future  -     Comprehensive Metabolic Panel  -     Lipid Panel  -     Vitamin D 25 Hydroxy  -     CBC (No Diff)    3. Acute right-sided low back pain with sciatica, sciatica laterality unspecified  -     CBC (No Diff); Future  -     Comprehensive Metabolic Panel  -     Lipid Panel  -     Vitamin D 25 Hydroxy  -     CBC (No Diff)    4. Subacute maxillary sinusitis  -     CBC (No Diff); Future  -     Comprehensive Metabolic Panel  -     Lipid Panel  -     Vitamin D 25 Hydroxy  -     CBC (No Diff)    5. Abnormal finding of blood chemistry, unspecified   -     Lipid Panel    Other orders  -     amoxicillin (AMOXIL) 875 MG tablet; Take 1 tablet by mouth 2 (Two) Times a Day.  Dispense: 20 tablet; Refill: 0  -     fluconazole (Diflucan) 150 MG tablet; Take 1 tablet today then repeat again in 3 days.  Dispense: 2 tablet; Refill: 0      Follow Up:  Return in about 6 months (around 8/3/2021), or if symptoms worsen or fail to improve, for Recheck.     An After Visit Summary and PPPS were given to the patient.

## 2021-02-03 NOTE — PATIENT INSTRUCTIONS
Medicare Wellness  Personal Prevention Plan of Service     Date of Office Visit:  2021  Encounter Provider:  Brian Khanna MD  Place of Service:  [facility]  Patient Name: Adelia Morris  :  1950    As part of the Medicare Wellness portion of your visit today, we are providing you with this personalized preventive plan of services (PPPS). This plan is based upon recommendations of the [place] Preventive Services Task Force (USPSTF) and the Advisory Committee on Immunization Practices (ACIP).    This lists the preventive care services that should be considered, and provides dates of when you are due. Items listed as completed are up-to-date and do not require any further intervention.    Health Maintenance   Topic Date Due   • TDAP/TD VACCINES (1 - Tdap) 1970   • PAP SMEAR  2016   • COLONOSCOPY  2021   • ANNUAL WELLNESS VISIT  2022   • MAMMOGRAM  2023   • HEPATITIS C SCREENING  Completed   • INFLUENZA VACCINE  Completed   • Pneumococcal Vaccine 65+  Completed   • ZOSTER VACCINE  Completed   • MENINGOCOCCAL VACCINE  Aged Out       Orders Placed This Encounter   Procedures   • CBC (No Diff)     Standing Status:   Future     Number of Occurrences:   1     Standing Expiration Date:   2/3/2022   • Comprehensive Metabolic Panel   • Lipid Panel   • Vitamin D 25 Hydroxy       No follow-ups on file.

## 2021-02-23 ENCOUNTER — TELEPHONE (OUTPATIENT)
Dept: FAMILY MEDICINE CLINIC | Facility: CLINIC | Age: 71
End: 2021-02-23

## 2021-02-23 RX ORDER — AZITHROMYCIN 250 MG/1
TABLET, FILM COATED ORAL
Qty: 6 TABLET | Refills: 0 | Status: SHIPPED | OUTPATIENT
Start: 2021-02-23 | End: 2021-05-25

## 2021-02-23 RX ORDER — FLUCONAZOLE 150 MG/1
TABLET ORAL
Qty: 2 TABLET | Refills: 0 | Status: SHIPPED | OUTPATIENT
Start: 2021-02-23 | End: 2021-05-25

## 2021-03-19 ENCOUNTER — BULK ORDERING (OUTPATIENT)
Dept: CASE MANAGEMENT | Facility: OTHER | Age: 71
End: 2021-03-19

## 2021-03-19 DIAGNOSIS — Z23 IMMUNIZATION DUE: ICD-10-CM

## 2021-05-25 ENCOUNTER — OFFICE VISIT (OUTPATIENT)
Dept: FAMILY MEDICINE CLINIC | Facility: CLINIC | Age: 71
End: 2021-05-25

## 2021-05-25 ENCOUNTER — LAB (OUTPATIENT)
Dept: LAB | Facility: HOSPITAL | Age: 71
End: 2021-05-25

## 2021-05-25 VITALS
SYSTOLIC BLOOD PRESSURE: 126 MMHG | WEIGHT: 206 LBS | OXYGEN SATURATION: 90 % | TEMPERATURE: 98.4 F | HEART RATE: 86 BPM | RESPIRATION RATE: 18 BRPM | BODY MASS INDEX: 32.33 KG/M2 | HEIGHT: 67 IN | DIASTOLIC BLOOD PRESSURE: 78 MMHG

## 2021-05-25 DIAGNOSIS — N30.90 CYSTITIS: Primary | ICD-10-CM

## 2021-05-25 LAB
BACTERIA UR QL AUTO: NORMAL /HPF
BILIRUB UR QL STRIP: NEGATIVE
CLARITY UR: CLEAR
COLOR UR: YELLOW
GLUCOSE UR STRIP-MCNC: NEGATIVE MG/DL
HGB UR QL STRIP.AUTO: NEGATIVE
HYALINE CASTS UR QL AUTO: NORMAL /LPF
KETONES UR QL STRIP: NEGATIVE
LEUKOCYTE ESTERASE UR QL STRIP.AUTO: ABNORMAL
NITRITE UR QL STRIP: NEGATIVE
PH UR STRIP.AUTO: <=5 [PH] (ref 5–8)
PROT UR QL STRIP: NEGATIVE
RBC # UR: NORMAL /HPF
REF LAB TEST METHOD: NORMAL
SP GR UR STRIP: <=1.005 (ref 1–1.03)
SQUAMOUS #/AREA URNS HPF: NORMAL /HPF
UROBILINOGEN UR QL STRIP: ABNORMAL
WBC UR QL AUTO: NORMAL /HPF

## 2021-05-25 PROCEDURE — 81001 URINALYSIS AUTO W/SCOPE: CPT | Performed by: NURSE PRACTITIONER

## 2021-05-25 PROCEDURE — 99213 OFFICE O/P EST LOW 20 MIN: CPT | Performed by: NURSE PRACTITIONER

## 2021-05-25 RX ORDER — CIPROFLOXACIN 500 MG/1
500 TABLET, FILM COATED ORAL 2 TIMES DAILY
Qty: 14 TABLET | Refills: 0 | Status: SHIPPED | OUTPATIENT
Start: 2021-05-25 | End: 2021-11-17

## 2021-05-25 RX ORDER — FLUCONAZOLE 150 MG/1
150 TABLET ORAL ONCE
Qty: 1 TABLET | Refills: 0 | Status: SHIPPED | OUTPATIENT
Start: 2021-05-25 | End: 2021-05-25

## 2021-05-25 NOTE — PATIENT INSTRUCTIONS
UA today erx cipro 500 mg BID x 1 wk, erx diflucan 150 mg after abx, call or RTC if sx persist or worsen, Wipe front to back, increase H20 8 glasses day, urinate after intercourse, enc freq toileting

## 2021-05-25 NOTE — PROGRESS NOTES
"Chief Complaint  Urinary Tract Infection (pressure strong odor in urine low back pain/requests Diflucan with any abx)    Subjective          Adelia Morris presents to [facility]  History of Present Illness  C/o urin odor and suprapubic tenderness with intermittent dysuria, back pain and fatigue worsening over the last week, last tx UTI 09/21 cipro 500 mg BID x 1 wk and diflucan prn,  no new sexual partners, no vag sx, drinking plenty of water, wiping front to back and urin after intercourse, Allergic clindamycin    Objective   Vital Signs:   /78 (BP Location: Left arm, Patient Position: Sitting)   Pulse 86   Temp 98.4 °F (36.9 °C) (Infrared)   Resp 18   Ht 170.2 cm (67\")   Wt 93.4 kg (206 lb)   SpO2 90%   BMI 32.26 kg/m²     Physical Exam  Vitals reviewed.   Constitutional:       Appearance: She is well-developed.   HENT:      Head: Normocephalic and atraumatic.   Eyes:      Conjunctiva/sclera: Conjunctivae normal.      Pupils: Pupils are equal, round, and reactive to light.   Cardiovascular:      Rate and Rhythm: Normal rate and regular rhythm.      Pulses: Normal pulses.      Heart sounds: Normal heart sounds.   Pulmonary:      Effort: Pulmonary effort is normal.      Breath sounds: Normal breath sounds.   Abdominal:      General: There is no distension.      Palpations: Abdomen is soft. There is no mass.      Tenderness: There is abdominal tenderness in the suprapubic area. There is no right CVA tenderness, left CVA tenderness, guarding or rebound.      Hernia: No hernia is present.   Musculoskeletal:         General: Normal range of motion.      Cervical back: Normal range of motion.      Right lower leg: No edema.      Left lower leg: No edema.   Skin:     General: Skin is warm and dry.   Neurological:      Mental Status: She is alert and oriented to person, place, and time.   Psychiatric:         Mood and Affect: Mood normal.         Behavior: Behavior " normal.         Thought Content: Thought content normal.         Judgment: Judgment normal.        Result Review :                 Assessment and Plan    Diagnoses and all orders for this visit:    1. Cystitis (Primary)  -     Urinalysis With Microscopic - Urine, Clean Catch    Other orders  -     ciprofloxacin (Cipro) 500 MG tablet; Take 1 tablet by mouth 2 (Two) Times a Day.  Dispense: 14 tablet; Refill: 0  -     fluconazole (Diflucan) 150 MG tablet; Take 1 tablet by mouth 1 (One) Time for 1 dose.  Dispense: 1 tablet; Refill: 0        Follow Up   No follow-ups on file.  Patient was given instructions and counseling regarding her condition or for health maintenance advice. Please see specific information pulled into the AVS if appropriate.   UA today erx cipro 500 mg BID x 1 wk, erx diflucan 150 mg after abx, call or RTC if sx persist or worsen, Wipe front to back, increase H20 8 glasses day, urinate after intercourse, enc freq toileting

## 2021-07-02 ENCOUNTER — TELEPHONE (OUTPATIENT)
Dept: FAMILY MEDICINE CLINIC | Facility: CLINIC | Age: 71
End: 2021-07-02

## 2021-07-02 RX ORDER — FLUCONAZOLE 150 MG/1
TABLET ORAL
Qty: 3 TABLET | Refills: 0 | Status: SHIPPED | OUTPATIENT
Start: 2021-07-02 | End: 2021-11-17

## 2021-07-02 RX ORDER — AZITHROMYCIN 250 MG/1
TABLET, FILM COATED ORAL
Qty: 6 TABLET | Refills: 0 | Status: SHIPPED | OUTPATIENT
Start: 2021-07-02 | End: 2021-11-17

## 2021-07-02 NOTE — TELEPHONE ENCOUNTER
Caller: Adelia Morris    Relationship: Self    Best call back number:[phone]     Medication needed: ANTIBIOTIC AND DIFLUCAN    When do you need the refill by: 07/02/21    What additional details did the patient provide when requesting the medication: PATIENT IS CALLING TO STATE SHE HAS A SINUS INFECTION.  SHE STATES SHE HAS FACIAL PAIN, PRESSURE, EAR POPPING, LOW GRADE TEMP, DRAINAGE, SWOLLEN GLANDS.  THERE ARE NO AVAILABLE APPOINTMENTS.  SHE IS REQUESTING THE ABOVE MEDICATIONS TO BE PRESCRIBED.        Does the patient have less than a 3 day supply:  [x] Yes  [] No    What is the patient's preferred pharmacy:    [facility] [address] [place] [address] [phone] [facility] [phone]   [phone]      PLEASE ADVISE.

## 2021-08-04 ENCOUNTER — LAB (OUTPATIENT)
Dept: FAMILY MEDICINE CLINIC | Facility: CLINIC | Age: 71
End: 2021-08-04

## 2021-08-04 DIAGNOSIS — E55.9 VITAMIN D DEFICIENCY: Primary | ICD-10-CM

## 2021-08-04 DIAGNOSIS — R53.83 FATIGUE, UNSPECIFIED TYPE: ICD-10-CM

## 2021-08-04 DIAGNOSIS — R63.5 WEIGHT GAIN: ICD-10-CM

## 2021-08-04 DIAGNOSIS — E78.5 DYSLIPIDEMIA: ICD-10-CM

## 2021-08-04 LAB
25(OH)D3 SERPL-MCNC: 49.2 NG/ML (ref 30–100)
ALBUMIN SERPL-MCNC: 4.5 G/DL (ref 3.5–5.2)
ALBUMIN/GLOB SERPL: 1.7 G/DL
ALP SERPL-CCNC: 69 U/L (ref 39–117)
ALT SERPL W P-5'-P-CCNC: 12 U/L (ref 1–33)
ANION GAP SERPL CALCULATED.3IONS-SCNC: 11.5 MMOL/L (ref 5–15)
AST SERPL-CCNC: 12 U/L (ref 1–32)
BILIRUB SERPL-MCNC: 0.5 MG/DL (ref 0–1.2)
BUN SERPL-MCNC: 9 MG/DL (ref 8–23)
BUN/CREAT SERPL: 13.8 (ref 7–25)
CALCIUM SPEC-SCNC: 9.8 MG/DL (ref 8.6–10.5)
CHLORIDE SERPL-SCNC: 107 MMOL/L (ref 98–107)
CHOLEST SERPL-MCNC: 165 MG/DL (ref 0–200)
CO2 SERPL-SCNC: 22.5 MMOL/L (ref 22–29)
CREAT SERPL-MCNC: 0.65 MG/DL (ref 0.57–1)
DEPRECATED RDW RBC AUTO: 40.7 FL (ref 37–54)
ERYTHROCYTE [DISTWIDTH] IN BLOOD BY AUTOMATED COUNT: 12.6 % (ref 12.3–15.4)
GFR SERPL CREATININE-BSD FRML MDRD: 90 ML/MIN/1.73
GLOBULIN UR ELPH-MCNC: 2.7 GM/DL
GLUCOSE SERPL-MCNC: 94 MG/DL (ref 65–99)
HCT VFR BLD AUTO: 41.1 % (ref 34–46.6)
HDLC SERPL-MCNC: 50 MG/DL (ref 40–60)
HGB BLD-MCNC: 14 G/DL (ref 12–15.9)
LDLC SERPL CALC-MCNC: 102 MG/DL (ref 0–100)
LDLC/HDLC SERPL: 2.04 {RATIO}
MCH RBC QN AUTO: 30.7 PG (ref 26.6–33)
MCHC RBC AUTO-ENTMCNC: 34.1 G/DL (ref 31.5–35.7)
MCV RBC AUTO: 90.1 FL (ref 79–97)
PLATELET # BLD AUTO: 253 10*3/MM3 (ref 140–450)
PMV BLD AUTO: 9.7 FL (ref 6–12)
POTASSIUM SERPL-SCNC: 4.1 MMOL/L (ref 3.5–5.2)
PROT SERPL-MCNC: 7.2 G/DL (ref 6–8.5)
RBC # BLD AUTO: 4.56 10*6/MM3 (ref 3.77–5.28)
SODIUM SERPL-SCNC: 141 MMOL/L (ref 136–145)
T-UPTAKE NFR SERPL: 1.05 TBI (ref 0.8–1.3)
T4 SERPL-MCNC: 9.07 MCG/DL (ref 4.5–11.7)
TRIGL SERPL-MCNC: 66 MG/DL (ref 0–150)
TSH SERPL DL<=0.05 MIU/L-ACNC: 1.37 UIU/ML (ref 0.27–4.2)
VLDLC SERPL-MCNC: 13 MG/DL (ref 5–40)
WBC # BLD AUTO: 6.66 10*3/MM3 (ref 3.4–10.8)

## 2021-08-04 PROCEDURE — 80061 LIPID PANEL: CPT | Performed by: INTERNAL MEDICINE

## 2021-08-04 PROCEDURE — 82306 VITAMIN D 25 HYDROXY: CPT | Performed by: INTERNAL MEDICINE

## 2021-08-04 PROCEDURE — 85027 COMPLETE CBC AUTOMATED: CPT | Performed by: INTERNAL MEDICINE

## 2021-08-04 PROCEDURE — 36415 COLL VENOUS BLD VENIPUNCTURE: CPT | Performed by: INTERNAL MEDICINE

## 2021-08-04 PROCEDURE — 84479 ASSAY OF THYROID (T3 OR T4): CPT | Performed by: INTERNAL MEDICINE

## 2021-08-04 PROCEDURE — 84436 ASSAY OF TOTAL THYROXINE: CPT | Performed by: INTERNAL MEDICINE

## 2021-08-04 PROCEDURE — 84443 ASSAY THYROID STIM HORMONE: CPT | Performed by: INTERNAL MEDICINE

## 2021-08-04 PROCEDURE — 80053 COMPREHEN METABOLIC PANEL: CPT | Performed by: INTERNAL MEDICINE

## 2021-08-06 ENCOUNTER — TELEPHONE (OUTPATIENT)
Dept: FAMILY MEDICINE CLINIC | Facility: CLINIC | Age: 71
End: 2021-08-06

## 2021-08-06 NOTE — TELEPHONE ENCOUNTER
Needs to be seen if still having symptoms. Recommend [facility] or can see if we have any openings next week.

## 2021-08-06 NOTE — TELEPHONE ENCOUNTER
Pt states that the zpak only helped temporally and would like amoxicillin that has helped better before. Can you call rx in??

## 2021-08-06 NOTE — TELEPHONE ENCOUNTER
Caller: Adelia Morris    Relationship: Self    Best call back number: [phone]    Who are you requesting to speak with (clinical staff, provider,  specific staff member): ANYONE    What was the call regarding: AN APPOINTMENT, PATIENT STATES THAT SHE IS EXPERIENCING DRAINAGE, CONGESTION, PRESSURE IN FACE AND EARS. SHE STATES THAT SHE WAS CALLED IN A ZPACK AND THERE HAS BEEN NO RELIEF.    Do you require a callback: YES

## 2021-10-14 ENCOUNTER — PREP FOR SURGERY (OUTPATIENT)
Dept: OTHER | Facility: HOSPITAL | Age: 71
End: 2021-10-14

## 2021-10-14 DIAGNOSIS — Z86.010 HISTORY OF COLON POLYPS: Primary | ICD-10-CM

## 2021-10-20 PROBLEM — Z86.0100 HISTORY OF COLON POLYPS: Status: ACTIVE | Noted: 2021-10-20

## 2021-10-20 PROBLEM — Z86.010 HISTORY OF COLON POLYPS: Status: ACTIVE | Noted: 2021-10-20

## 2021-10-22 RX ORDER — FLUTICASONE PROPIONATE 50 MCG
SPRAY, SUSPENSION (ML) NASAL
Qty: 16 ML | Refills: 3 | Status: SHIPPED | OUTPATIENT
Start: 2021-10-22 | End: 2022-01-31 | Stop reason: SDUPTHER

## 2021-11-17 ENCOUNTER — OFFICE VISIT (OUTPATIENT)
Dept: FAMILY MEDICINE CLINIC | Facility: CLINIC | Age: 71
End: 2021-11-17

## 2021-11-17 VITALS
DIASTOLIC BLOOD PRESSURE: 84 MMHG | SYSTOLIC BLOOD PRESSURE: 128 MMHG | HEART RATE: 68 BPM | BODY MASS INDEX: 31.52 KG/M2 | HEIGHT: 67 IN | WEIGHT: 200.8 LBS | TEMPERATURE: 97.7 F | OXYGEN SATURATION: 94 %

## 2021-11-17 DIAGNOSIS — M54.40 ACUTE RIGHT-SIDED LOW BACK PAIN WITH SCIATICA, SCIATICA LATERALITY UNSPECIFIED: Primary | ICD-10-CM

## 2021-11-17 DIAGNOSIS — D12.2 ADENOMATOUS POLYP OF ASCENDING COLON: ICD-10-CM

## 2021-11-17 DIAGNOSIS — M85.851 OSTEOPENIA OF RIGHT HIP: ICD-10-CM

## 2021-11-17 DIAGNOSIS — R79.9 ABNORMAL FINDING OF BLOOD CHEMISTRY, UNSPECIFIED: ICD-10-CM

## 2021-11-17 LAB
25(OH)D3 SERPL-MCNC: 51.1 NG/ML (ref 30–100)
ALBUMIN SERPL-MCNC: 4.9 G/DL (ref 3.5–5.2)
ALBUMIN/GLOB SERPL: 1.8 G/DL
ALP SERPL-CCNC: 71 U/L (ref 39–117)
ALT SERPL W P-5'-P-CCNC: 11 U/L (ref 1–33)
ANION GAP SERPL CALCULATED.3IONS-SCNC: 10.8 MMOL/L (ref 5–15)
AST SERPL-CCNC: 13 U/L (ref 1–32)
BILIRUB SERPL-MCNC: 0.3 MG/DL (ref 0–1.2)
BUN SERPL-MCNC: 8 MG/DL (ref 8–23)
BUN/CREAT SERPL: 18.2 (ref 7–25)
CALCIUM SPEC-SCNC: 9.6 MG/DL (ref 8.6–10.5)
CHLORIDE SERPL-SCNC: 104 MMOL/L (ref 98–107)
CHOLEST SERPL-MCNC: 163 MG/DL (ref 0–200)
CO2 SERPL-SCNC: 25.2 MMOL/L (ref 22–29)
CREAT SERPL-MCNC: 0.44 MG/DL (ref 0.57–1)
DEPRECATED RDW RBC AUTO: 42.1 FL (ref 37–54)
ERYTHROCYTE [DISTWIDTH] IN BLOOD BY AUTOMATED COUNT: 12.4 % (ref 12.3–15.4)
GFR SERPL CREATININE-BSD FRML MDRD: 141 ML/MIN/1.73
GLOBULIN UR ELPH-MCNC: 2.8 GM/DL
GLUCOSE SERPL-MCNC: 87 MG/DL (ref 65–99)
HCT VFR BLD AUTO: 43 % (ref 34–46.6)
HDLC SERPL-MCNC: 55 MG/DL (ref 40–60)
HGB BLD-MCNC: 14.5 G/DL (ref 12–15.9)
LDLC SERPL CALC-MCNC: 97 MG/DL (ref 0–100)
LDLC/HDLC SERPL: 1.76 {RATIO}
MCH RBC QN AUTO: 31.2 PG (ref 26.6–33)
MCHC RBC AUTO-ENTMCNC: 33.7 G/DL (ref 31.5–35.7)
MCV RBC AUTO: 92.5 FL (ref 79–97)
PLATELET # BLD AUTO: 273 10*3/MM3 (ref 140–450)
PMV BLD AUTO: 9.4 FL (ref 6–12)
POTASSIUM SERPL-SCNC: 4.1 MMOL/L (ref 3.5–5.2)
PROT SERPL-MCNC: 7.7 G/DL (ref 6–8.5)
RBC # BLD AUTO: 4.65 10*6/MM3 (ref 3.77–5.28)
SODIUM SERPL-SCNC: 140 MMOL/L (ref 136–145)
TRIGL SERPL-MCNC: 57 MG/DL (ref 0–150)
VLDLC SERPL-MCNC: 11 MG/DL (ref 5–40)
WBC NRBC COR # BLD: 6.27 10*3/MM3 (ref 3.4–10.8)

## 2021-11-17 PROCEDURE — 82306 VITAMIN D 25 HYDROXY: CPT | Performed by: INTERNAL MEDICINE

## 2021-11-17 PROCEDURE — 90471 IMMUNIZATION ADMIN: CPT | Performed by: INTERNAL MEDICINE

## 2021-11-17 PROCEDURE — 80053 COMPREHEN METABOLIC PANEL: CPT | Performed by: INTERNAL MEDICINE

## 2021-11-17 PROCEDURE — 90715 TDAP VACCINE 7 YRS/> IM: CPT | Performed by: INTERNAL MEDICINE

## 2021-11-17 PROCEDURE — 80061 LIPID PANEL: CPT | Performed by: INTERNAL MEDICINE

## 2021-11-17 PROCEDURE — 99214 OFFICE O/P EST MOD 30 MIN: CPT | Performed by: INTERNAL MEDICINE

## 2021-11-17 PROCEDURE — 85027 COMPLETE CBC AUTOMATED: CPT | Performed by: INTERNAL MEDICINE

## 2021-11-17 PROCEDURE — 36415 COLL VENOUS BLD VENIPUNCTURE: CPT | Performed by: INTERNAL MEDICINE

## 2021-11-17 NOTE — PROGRESS NOTES
Subjective   Adelia Morris is a 71 y.o. female.     Vitals:    11/17/21 1309   BP: 128/84   Pulse: 68   Temp: 97.7 °F (36.5 °C)   SpO2: 94%      Body mass index is 31.45 kg/m².     History of Present Illness   Patient was seen for low back pain.  Patient has a history of low back pain and was treated with anti-inflammatories.  Patient was using ibuprofen 200 mg every 6 hours as needed.  Patient also uses Tylenol as needed.  Patient symptoms been relieved with this treatment.  She does have osteopenia of the right hip patient is using calcium citrate and vitamin D at 5000 units daily.  Patient is also on a low impact exercise activity.  Patient does have a history of adenomatous polyps being followed by gastroenterology.  Patient is having labs and will follow up after labs.    Dictated utilizing Dragon dictation. If there are questions or for further clarification, please contact me.  The following portions of the patient's history were reviewed and updated as appropriate: allergies, current medications, past family history, past medical history, past social history, past surgical history and problem list.    Review of Systems   Constitutional: Negative for fatigue and fever.   HENT: Positive for congestion. Negative for trouble swallowing.    Eyes: Negative for discharge and visual disturbance.   Respiratory: Negative for choking and shortness of breath.    Cardiovascular: Negative for chest pain and palpitations.   Gastrointestinal: Negative for abdominal pain and blood in stool.   Endocrine: Negative.    Genitourinary: Negative for genital sores and hematuria.   Musculoskeletal: Positive for back pain and joint swelling. Negative for gait problem.   Skin: Negative for color change, pallor, rash and wound.   Allergic/Immunologic: Positive for environmental allergies. Negative for immunocompromised state.   Neurological: Negative for facial asymmetry and speech difficulty.   Psychiatric/Behavioral: Negative  for hallucinations and suicidal ideas.       Objective   Physical Exam  Vitals and nursing note reviewed.   Constitutional:       Appearance: Normal appearance. She is well-developed.   HENT:      Head: Normocephalic and atraumatic.      Nose: Nose normal.      Mouth/Throat:      Mouth: Mucous membranes are moist.      Pharynx: Oropharynx is clear.   Eyes:      Extraocular Movements: Extraocular movements intact.      Conjunctiva/sclera: Conjunctivae normal.      Pupils: Pupils are equal, round, and reactive to light.   Cardiovascular:      Rate and Rhythm: Normal rate and regular rhythm.      Heart sounds: Normal heart sounds. No murmur heard.  No friction rub. No gallop.    Pulmonary:      Effort: Pulmonary effort is normal. No respiratory distress.      Breath sounds: Normal breath sounds. No stridor. No wheezing, rhonchi or rales.   Chest:      Chest wall: No tenderness.   Abdominal:      General: Bowel sounds are normal.      Palpations: Abdomen is soft.   Musculoskeletal:         General: Normal range of motion.      Cervical back: Normal range of motion and neck supple.   Skin:     General: Skin is warm and dry.   Neurological:      General: No focal deficit present.      Mental Status: She is alert and oriented to person, place, and time. Mental status is at baseline.   Psychiatric:         Mood and Affect: Mood normal.         Behavior: Behavior normal.         Thought Content: Thought content normal.         Judgment: Judgment normal.         Assessment/Plan #1 continue ibuprofen and Tylenol No. 2 continue present treatment for osteopenia #3 labs  Problems Addressed this Visit        Gastrointestinal Abdominal     Colon polyp    Relevant Orders    CBC (No Diff)    Comprehensive Metabolic Panel    Lipid Panel    Vitamin D 25 Hydroxy       Musculoskeletal and Injuries    Acute right-sided low back pain with sciatica - Primary    Relevant Orders    CBC (No Diff)    Comprehensive Metabolic Panel    Lipid Panel     Vitamin D 25 Hydroxy    Osteopenia of hip    Relevant Orders    CBC (No Diff)    Comprehensive Metabolic Panel    Lipid Panel    Vitamin D 25 Hydroxy      Other Visit Diagnoses     Abnormal finding of blood chemistry, unspecified         Relevant Orders    Lipid Panel      Diagnoses     Diagnosis Codes Comments    Acute right-sided low back pain with sciatica, sciatica laterality unspecified    -  Primary ICD-10-CM: M54.40  ICD-9-CM: 724.2, 724.3     Osteopenia of right hip     ICD-10-CM: M85.851  ICD-9-CM: 733.90     Adenomatous polyp of ascending colon     ICD-10-CM: D12.2  ICD-9-CM: 211.3     Abnormal finding of blood chemistry, unspecified      ICD-10-CM: R79.9  ICD-9-CM: 790.6

## 2021-11-17 NOTE — PROGRESS NOTES
The ABCs of the Annual Wellness Visit  Subsequent Medicare Wellness Visit    Chief Complaint   Patient presents with   • Medicare Wellness-subsequent   • ?vertigo  fluid ears      Subjective    History of Present Illness:  Adelia Morris is a 71 y.o. female who presents for a Subsequent Medicare Wellness Visit.    The following portions of the patient's history were reviewed and   updated as appropriate: past family history, past medical history, past social history, past surgical history and problem list.    Compared to one year ago, the patient feels her physical   health is the same.    Compared to one year ago, the patient feels her mental   health is the same.    Recent Hospitalizations:  She was not admitted to the hospital during the last year.       Current Medical Providers:  Patient Care Team:  Brian Khanna MD as PCP - General (Internal Medicine)  Lacy Avila MD as Consulting Physician (Gynecology)  Rafael Espinoza MD as Consulting Physician (Dermatology)    Outpatient Medications Prior to Visit   Medication Sig Dispense Refill   • Acetaminophen (TYLENOL 8 HOUR PO) Take  by mouth As Needed.     • Calcium Citrate (CITRACAL PO) Take  by mouth.     • cetirizine (ZyrTEC) 10 MG tablet Take 10 mg by mouth daily.     • Cholecalciferol (VITAMIN D-3) 125 MCG (5000 UT) tablet Take  by mouth Daily.     • fluticasone (FLONASE) 50 MCG/ACT nasal spray SPRAY TWO SPRAYS IN EACH NOSTRIL ONCE DAILY 16 mL 3   • ibuprofen (ADVIL,MOTRIN) 200 MG tablet Take 200 mg by mouth Every 6 (Six) Hours As Needed for mild pain (1-3).     • azithromycin (Zithromax Z-Vince) 250 MG tablet Take 2 tablets by mouth on day 1, then 1 tablet daily on days 2-5 6 tablet 0   • ciprofloxacin (Cipro) 500 MG tablet Take 1 tablet by mouth 2 (Two) Times a Day. 14 tablet 0   • fluconazole (Diflucan) 150 MG tablet 1 p.o. daily as needed 1, 4, 7, for yeast infection 3 tablet 0     No facility-administered medications prior to visit.       No  "opioid medication identified on active medication list. I have reviewed chart for other potential  high risk medication/s and harmful drug interactions in the elderly.          Aspirin is not on active medication list.  Aspirin use is not indicated based on review of current medical condition/s. Risk of harm outweighs potential benefits.  .    Patient Active Problem List   Diagnosis   • Healthcare maintenance   • Encounter for screening colonoscopy   • Colon polyp   • Acute right-sided low back pain with sciatica   • Allergic drug rash   • Osteopenia of hip   • Cutaneous cyst   • History of colon polyps     Advance Care Planning  Advance Directive is not on file.  ACP discussion was held with the patient during this visit. Patient has an advance directive (not in EMR), copy requested.          Objective    Vitals:    11/17/21 1309   BP: 128/84   Pulse: 68   Temp: 97.7 °F (36.5 °C)   SpO2: 94%   Weight: 91.1 kg (200 lb 12.8 oz)   Height: 170.2 cm (67\")     BMI Readings from Last 1 Encounters:   11/17/21 31.45 kg/m²   BMI is above normal parameters. Recommendations include: {BMI Follow-up Overweight:50987}    Does the patient have evidence of cognitive impairment? No    Physical Exam            HEALTH RISK ASSESSMENT    Smoking Status:  Social History     Tobacco Use   Smoking Status Never Smoker   Smokeless Tobacco Never Used     Alcohol Consumption:  Social History     Substance and Sexual Activity   Alcohol Use No     Fall Risk Screen:    [place] Fall Risk Assessment was completed, and patient is at LOW risk for falls.Assessment completed on:2/3/2021    Depression Screening:  PHQ-2/PHQ-9 Depression Screening 11/17/2021   Little interest or pleasure in doing things 0   Feeling down, depressed, or hopeless 0   Trouble falling or staying asleep, or sleeping too much 0   Feeling tired or having little energy 0   Poor appetite or overeating 0   Feeling bad about yourself - or that you are a failure or have let yourself " or your family down 0   Trouble concentrating on things, such as reading the newspaper or watching television 0   Moving or speaking so slowly that other people could have noticed. Or the opposite - being so fidgety or restless that you have been moving around a lot more than usual 0   Thoughts that you would be better off dead, or of hurting yourself in some way 0   Total Score 0   If you checked off any problems, how difficult have these problems made it for you to do your work, take care of things at home, or get along with other people? Not difficult at all       Health Habits and Functional and Cognitive Screening:  Functional & Cognitive Status 11/17/2021   Do you have difficulty preparing food and eating? No   Do you have difficulty bathing yourself, getting dressed or grooming yourself? No   Do you have difficulty using the toilet? No   Do you have difficulty moving around from place to place? No   Do you have trouble with steps or getting out of a bed or a chair? No   Current Diet Well Balanced Diet   Dental Exam Up to date   Eye Exam Up to date   Exercise (times per week) 4 times per week   Current Exercises Include Walking   Current Exercise Activities Include -   Do you need help using the phone?  No   Are you deaf or do you have serious difficulty hearing?  No   Do you need help with transportation? No   Do you need help shopping? No   Do you need help preparing meals?  No   Do you need help with housework?  No   Do you need help with laundry? No   Do you need help taking your medications? No   Do you need help managing money? No   Do you ever drive or ride in a car without wearing a seat belt? No   Have you felt unusual stress, anger or loneliness in the last month? No   Who do you live with? Spouse   If you need help, do you have trouble finding someone available to you? No   Have you been bothered in the last four weeks by sexual problems? No   Do you have difficulty concentrating, remembering or  making decisions? No       Age-appropriate Screening Schedule:  Refer to the list below for future screening recommendations based on patient's age, sex and/or medical conditions. Orders for these recommended tests are listed in the plan section. The patient has been provided with a written plan.    Health Maintenance   Topic Date Due   • TDAP/TD VACCINES (1 - Tdap) Never done   • PAP SMEAR  Never done   • DXA SCAN  11/30/2019   • MAMMOGRAM  01/13/2023   • INFLUENZA VACCINE  Completed   • ZOSTER VACCINE  Completed              Assessment/Plan   CMS Preventative Services Quick Reference  Risk Factors Identified During Encounter  Dementia/Memory   Depression/Dysphoria  Hearing Problem  The above risks/problems have been discussed with the patient.  Follow up actions/plans if indicated are seen below in the Assessment/Plan Section.  Pertinent information has been shared with the patient in the After Visit Summary.    There are no diagnoses linked to this encounter.    Follow Up:   No follow-ups on file.     An After Visit Summary and PPPS were made available to the patient.    {Optional Chart Navigation Links Wrapup  Review (Popup)  Advance Care Planning  Labs  CC  Problem List  Visit Diagnosis  Medications  Result Review  Imaging  Health Maintenance  Quality  BestPractice  SmartSets  SnapShot  Encounters  Notes  Media  Procedures :23}    {Time Spent-Use for E/M Coding (Optional):40405}

## 2021-12-02 ENCOUNTER — HOSPITAL ENCOUNTER (OUTPATIENT)
Facility: HOSPITAL | Age: 71
Setting detail: HOSPITAL OUTPATIENT SURGERY
Discharge: HOME | End: 2021-12-02
Attending: SURGERY | Admitting: SURGERY

## 2021-12-02 ENCOUNTER — ANESTHESIA EVENT (OUTPATIENT)
Dept: GASTROENTEROLOGY | Facility: HOSPITAL | Age: 71
End: 2021-12-02

## 2021-12-02 ENCOUNTER — ANESTHESIA (OUTPATIENT)
Dept: GASTROENTEROLOGY | Facility: HOSPITAL | Age: 71
End: 2021-12-02

## 2021-12-02 VITALS
OXYGEN SATURATION: 96 % | HEIGHT: 67 IN | RESPIRATION RATE: 16 BRPM | WEIGHT: 199 LBS | DIASTOLIC BLOOD PRESSURE: 68 MMHG | SYSTOLIC BLOOD PRESSURE: 115 MMHG | BODY MASS INDEX: 31.23 KG/M2 | HEART RATE: 68 BPM

## 2021-12-02 PROCEDURE — S0260 H&P FOR SURGERY: HCPCS | Performed by: SURGERY

## 2021-12-02 PROCEDURE — G0105 COLORECTAL SCRN; HI RISK IND: HCPCS | Performed by: SURGERY

## 2021-12-02 PROCEDURE — 25010000002 PROPOFOL 10 MG/ML EMULSION: Performed by: ANESTHESIOLOGY

## 2021-12-02 RX ORDER — SODIUM CHLORIDE, SODIUM LACTATE, POTASSIUM CHLORIDE, CALCIUM CHLORIDE 600; 310; 30; 20 MG/100ML; MG/100ML; MG/100ML; MG/100ML
30 INJECTION, SOLUTION INTRAVENOUS CONTINUOUS PRN
Status: DISCONTINUED | OUTPATIENT
Start: 2021-12-02 | End: 2021-12-02 | Stop reason: HOSPADM

## 2021-12-02 RX ORDER — LIDOCAINE HYDROCHLORIDE 20 MG/ML
INJECTION, SOLUTION INFILTRATION; PERINEURAL AS NEEDED
Status: DISCONTINUED | OUTPATIENT
Start: 2021-12-02 | End: 2021-12-02 | Stop reason: SURG

## 2021-12-02 RX ORDER — PROPOFOL 10 MG/ML
VIAL (ML) INTRAVENOUS AS NEEDED
Status: DISCONTINUED | OUTPATIENT
Start: 2021-12-02 | End: 2021-12-02 | Stop reason: SURG

## 2021-12-02 RX ADMIN — PROPOFOL 140 MCG/KG/MIN: 10 INJECTION, EMULSION INTRAVENOUS at 08:13

## 2021-12-02 RX ADMIN — PROPOFOL 150 MG: 10 INJECTION, EMULSION INTRAVENOUS at 08:13

## 2021-12-02 RX ADMIN — SODIUM CHLORIDE, POTASSIUM CHLORIDE, SODIUM LACTATE AND CALCIUM CHLORIDE 30 ML/HR: 600; 310; 30; 20 INJECTION, SOLUTION INTRAVENOUS at 08:02

## 2021-12-02 RX ADMIN — LIDOCAINE HYDROCHLORIDE 100 MG: 20 INJECTION, SOLUTION INFILTRATION; PERINEURAL at 08:13

## 2021-12-02 NOTE — OP NOTE
Surgeon:     Jarrod John Jr., M.D.    Preoperative Diagnosis:     History of colon polyps    Postoperative Diagnosis:     Diverticulosis    Procedure Performed:     Colonoscopy    Indications:     The patient is a pleasant 71-year-old female with a history of colon polyps.  She presents for screening colonoscopy in high risk group.  The patient understands the indications, alternatives, risks, and benefits of the procedure and wishes to proceed.    Procedure:     The patient was identified, taken to the endoscopy suite, and place in the left side down decubitus procedure.  The patient underwent a MAC anesthesia and was appropriately monitored through the case by the anesthesia personnel.  A rectal exam was performed that was normal.  The colonoscope was introduced into the rectum and advanced very carefully to the cecum that was identified by the cecal strap, ileocecal valve, and the appendiceal orifice.  The scope was then slowly withdrawn with careful circumferential examination of the mucosa performed.  The bowel prep was good allowing adequate visualization of mucosal surfaces.  The scope was withdrawn.  The patient tolerated the procedure well and was transferred the recovery area in stable condition.    Findings:    There was diverticulosis that was mild but involving the entire colon.  No other abnormalities were present.    Recommendations:     1.  High-fiber diet.  2.  Repeat colonoscopy in 5 years based on her history of colon polyps.    Jarrod John Jr., M.D.

## 2021-12-02 NOTE — ANESTHESIA POSTPROCEDURE EVALUATION
"Patient: Adelia Morris    Procedure Summary     Date: 12/02/21 Room / Location:  [facility]    Anesthesia Start: 0806 Anesthesia Stop: 0842    Procedure: COLONOSCOPY TO CECUM AND TI (N/A ) Diagnosis:       History of colon polyps      (History of colon polyps [Z86.010])    Surgeons: Jarrod John Jr., MD Provider: Malcom Menon MD    Anesthesia Type: MAC ASA Status: 2          Anesthesia Type: MAC    Vitals  Vitals Value Taken Time   /74 12/02/21 0839   Temp     Pulse 90 12/02/21 0839   Resp 18 12/02/21 0839   SpO2 92 % 12/02/21 0839           Post Anesthesia Care and Evaluation    Pain management: adequate  Airway patency: patent  Anesthetic complications: No anesthetic complications    Cardiovascular status: acceptable  Respiratory status: acceptable  Hydration status: acceptable    Comments: /74 (BP Location: Left arm, Patient Position: Lying)   Pulse 90   Resp 18   Ht 170.2 cm (67\")   Wt 90.3 kg (199 lb)   SpO2 92%   BMI 31.17 kg/m²         "

## 2021-12-02 NOTE — ANESTHESIA PREPROCEDURE EVALUATION
Anesthesia Evaluation                  Airway   Mallampati: II  Dental      Pulmonary    (-) asthma, sleep apnea, not a smoker    ROS comment: Negative patient screen for DONATO    Cardiovascular     (-) hypertension      Neuro/Psych  GI/Hepatic/Renal/Endo    (+) obesity,       Musculoskeletal     Abdominal    Substance History      OB/GYN          Other                        Anesthesia Plan    ASA 2     MAC       Anesthetic plan, all risks, benefits, and alternatives have been provided, discussed and informed consent has been obtained with: patient.

## 2021-12-02 NOTE — H&P
[facility]   HISTORY AND PHYSICAL    Patient Name: Adelia Morris  : 1950  MRN: [id number]  Primary Care Physician:  Brian Khanna MD  Date of admission: 2021    Subjective   Subjective     Chief Complaint: History of colon polyps    History of Present Illness     The patient is a pleasant 71-year-old female who has a previous history of colonic polyps.  Her last colonoscopy was 5 years ago.  She has not had any significant GI symptoms since that time.  She presents for screening colonoscopy high risk group.    Review of Systems   Constitutional: Negative for fatigue and fever.   Respiratory: Negative for chest tightness and shortness of breath.    Cardiovascular: Negative for chest pain and palpitations.   Gastrointestinal: Negative for abdominal pain, blood in stool, constipation, diarrhea, nausea and vomiting.        Personal History     Past Medical History:   Diagnosis Date   • Colon polyps    • Hemorrhoids    • Low back pain with right-sided sciatica    • Menopause    • UTI (urinary tract infection)        Past Surgical History:   Procedure Laterality Date   • BUNIONECTOMY      right   •  SECTION  1993   • COLONOSCOPY N/A 2016    Procedure: COLONOSCOPY ith polypectomy (cold bx);  Surgeon: Jarrod John Jr., MD;  Location: [facility] ENDOSCOPY;  Service:    • COLONOSCOPY      WNL PER PT     • EYE SURGERY      cataracts   • LAPAROSCOPIC TUBAL LIGATION         Family History: family history includes Cancer in her father; Hypertension in her sister. Otherwise pertinent FHx was reviewed and not pertinent to current issue.    Social History:  reports that she has never smoked. She has never used smokeless tobacco. She reports that she does not drink alcohol and does not use drugs.    Home Medications:  Acetaminophen, Calcium Citrate, Vitamin D-3, cetirizine, fluticasone, and ibuprofen    Allergies:  Allergies   Allergen Reactions   • Clindamycin/Lincomycin  Rash       Objective    Objective     Vitals:   Heart Rate:  [73] 73  Resp:  [16] 16  BP: (128)/(73) 128/73    Physical Exam  Constitutional:       Appearance: Normal appearance. She is well-developed. She is not toxic-appearing.   Eyes:      General: No scleral icterus.  Pulmonary:      Effort: Pulmonary effort is normal. No respiratory distress.   Abdominal:      Palpations: Abdomen is soft.      Tenderness: There is no abdominal tenderness.   Skin:     General: Skin is warm and dry.   Neurological:      Mental Status: She is alert and oriented to person, place, and time.   Psychiatric:         Behavior: Behavior normal.         Thought Content: Thought content normal.         Judgment: Judgment normal.           Assessment/Plan   Assessment / Plan     Brief Patient Summary:  Adelia Morris is a 71 y.o. female who has a history of colon polyps.    Active Hospital Problems:  Active Hospital Problems    Diagnosis    • **History of colon polyps      Added automatically from request for surgery [id number]       Plan: Colonoscopy.  The patient understands the indications, alternatives, risks, and benefits of the procedure and wishes to proceed.      Jarrod John Jr., MD

## 2021-12-02 NOTE — DISCHARGE INSTRUCTIONS
For the next 24 hours patient needs to be with a responsible adult.    For 24 hours DO NOT drive, operate machinery, appliances, drink alcohol, make important decisions or sign legal documents.    Start with a light or bland diet and advance to regular diet as tolerated.    Follow recommendations on procedure report provided by your doctor.    Call Dr John for problems [phone]    Problems may include but not limited to: large amounts of bleeding, trouble breathing, repeated vomiting, severe unrelieved pain, fever or chills.

## 2022-01-31 ENCOUNTER — OFFICE VISIT (OUTPATIENT)
Dept: FAMILY MEDICINE CLINIC | Facility: CLINIC | Age: 72
End: 2022-01-31

## 2022-01-31 VITALS
HEART RATE: 85 BPM | HEIGHT: 67 IN | OXYGEN SATURATION: 97 % | SYSTOLIC BLOOD PRESSURE: 120 MMHG | WEIGHT: 201.4 LBS | BODY MASS INDEX: 31.61 KG/M2 | DIASTOLIC BLOOD PRESSURE: 76 MMHG | TEMPERATURE: 98.4 F

## 2022-01-31 DIAGNOSIS — J30.9 ALLERGIC RHINITIS, UNSPECIFIED SEASONALITY, UNSPECIFIED TRIGGER: ICD-10-CM

## 2022-01-31 DIAGNOSIS — R35.0 URINARY FREQUENCY: Primary | ICD-10-CM

## 2022-01-31 LAB
BACTERIA UR QL AUTO: ABNORMAL /HPF
BILIRUB UR QL STRIP: NEGATIVE
CLARITY UR: CLEAR
COLOR UR: YELLOW
GLUCOSE UR STRIP-MCNC: NEGATIVE MG/DL
HGB UR QL STRIP.AUTO: ABNORMAL
KETONES UR QL STRIP: NEGATIVE
LEUKOCYTE ESTERASE UR QL STRIP.AUTO: ABNORMAL
NITRITE UR QL STRIP: NEGATIVE
PH UR STRIP.AUTO: 6 [PH] (ref 4.6–8)
PROT UR QL STRIP: NEGATIVE
RBC # UR STRIP: ABNORMAL /HPF
REF LAB TEST METHOD: ABNORMAL
SP GR UR STRIP: <=1.005 (ref 1–1.03)
SQUAMOUS #/AREA URNS HPF: ABNORMAL /HPF
UROBILINOGEN UR QL STRIP: ABNORMAL
WBC # UR STRIP: ABNORMAL /HPF

## 2022-01-31 PROCEDURE — 99213 OFFICE O/P EST LOW 20 MIN: CPT | Performed by: NURSE PRACTITIONER

## 2022-01-31 PROCEDURE — 81001 URINALYSIS AUTO W/SCOPE: CPT | Performed by: NURSE PRACTITIONER

## 2022-01-31 RX ORDER — CIPROFLOXACIN 500 MG/1
500 TABLET, FILM COATED ORAL 2 TIMES DAILY
Qty: 14 TABLET | Refills: 0 | Status: SHIPPED | OUTPATIENT
Start: 2022-01-31 | End: 2022-03-11

## 2022-01-31 RX ORDER — FLUTICASONE PROPIONATE 50 MCG
2 SPRAY, SUSPENSION (ML) NASAL DAILY
Qty: 16 ML | Refills: 3 | Status: SHIPPED | OUTPATIENT
Start: 2022-01-31

## 2022-01-31 NOTE — PROGRESS NOTES
"Chief Complaint  Urinary pressure Frequency and drainage in ears    Subjective          Adelia Morris presents to [facility]  History of Present Illness  C/o urinary frequency, pressure, dysuria at times, also had low back pain, started about 3-4 days ago, she denies bleeding or d/c. She did notice odor, she denies fever. Did try vinegar OTC. Also tried to increase fluids.   Also c/o drainage in ears. States she has drainage, worse in am, does have dizziness at times. She does have decreased hearing at times, does have tinnitus at times. She is not currently taking zyrtec and flonase. She does have sinus pressure at times.           Objective   Vital Signs:   /76   Pulse 85   Temp 98.4 °F (36.9 °C)   Ht 170.2 cm (67\")   Wt 91.4 kg (201 lb 6.4 oz)   SpO2 97%   BMI 31.54 kg/m²     Physical Exam  Vitals and nursing note reviewed.   Constitutional:       Appearance: She is well-developed.   HENT:      Head: Normocephalic.      Right Ear: Tympanic membrane and ear canal normal.      Left Ear: Tympanic membrane and ear canal normal. There is impacted cerumen.      Nose: Nose normal.   Eyes:      Pupils: Pupils are equal, round, and reactive to light.   Cardiovascular:      Rate and Rhythm: Normal rate and regular rhythm.      Pulses: Normal pulses.      Heart sounds: Normal heart sounds.   Pulmonary:      Effort: Pulmonary effort is normal.      Breath sounds: Normal breath sounds.   Abdominal:      Tenderness: There is no right CVA tenderness or left CVA tenderness.   Skin:     General: Skin is warm and dry.   Neurological:      Mental Status: She is alert and oriented to person, place, and time.   Psychiatric:         Behavior: Behavior normal.         Judgment: Judgment normal.        Result Review :                 Assessment and Plan    Diagnoses and all orders for this visit:    1. Urinary frequency (Primary)  -     Urinalysis With Microscopic - Urine, Clean Catch    2. " Allergic rhinitis, unspecified seasonality, unspecified trigger    Other orders  -     ciprofloxacin (Cipro) 500 MG tablet; Take 1 tablet by mouth 2 (Two) Times a Day.  Dispense: 14 tablet; Refill: 0  -     fluticasone (FLONASE) 50 MCG/ACT nasal spray; 2 sprays by Each Nare route Daily.  Dispense: 16 mL; Refill: 3        Follow Up   Return if symptoms worsen or fail to improve.  Patient was given instructions and counseling regarding her condition or for health maintenance advice. Please see specific information pulled into the AVS if appropriate.     UA today,   Start cipro 500mg bid for 7 days.   Drink plenty of H2O, wipe front to back after urination.   Avoid bladder irritants- coffee, caffeine, carbonation.  Deferred ear lavage today, do not put anything in ears. Resume flonase as needed for drainage.   if symptoms persist 5-7 days call office.   Patient agrees with plan of care and understands instructions. Call if worsening symptoms or any problems or concerns.

## 2022-01-31 NOTE — PATIENT INSTRUCTIONS
UA today,   Start cipro 500mg bid for 7 days.   Drink plenty of H2O, wipe front to back after urination.   Avoid bladder irritants- coffee, caffeine, carbonation.  Deferred ear lavage today, do not put anything in ears. Resume flonase as needed for drainage.   if symptoms persist 5-7 days call office.   Patient agrees with plan of care and understands instructions. Call if worsening symptoms or any problems or concerns.

## 2022-02-01 ENCOUNTER — TELEPHONE (OUTPATIENT)
Dept: FAMILY MEDICINE CLINIC | Facility: CLINIC | Age: 72
End: 2022-02-01

## 2022-02-01 RX ORDER — FLUCONAZOLE 150 MG/1
150 TABLET ORAL ONCE
Qty: 1 TABLET | Refills: 0 | Status: SHIPPED | OUTPATIENT
Start: 2022-02-01 | End: 2022-02-01

## 2022-02-01 NOTE — TELEPHONE ENCOUNTER
Caller: Adelia Morris    Relationship: Self    Best call back number: [phone]    What medication are you requesting: DIFLUCAN    Have you had these symptoms before:    [x] Yes  [] No    Have you been treated for these symptoms before:   [x] Yes  [] No    If a prescription is needed, what is your preferred pharmacy and phone number: [facility] [address] [place] [address] [phone] [facility] [phone]      Additional notes:PATIENT WAS PRESCRIBED CIPRO YESTERDAY FOR UTI, REQUESTING DIFLUCAN BECAUSE THE CIPRO CAUSES A YEAST INFECTION FOR HER.

## 2022-02-22 ENCOUNTER — TRANSCRIBE ORDERS (OUTPATIENT)
Dept: ADMINISTRATIVE | Facility: HOSPITAL | Age: 72
End: 2022-02-22

## 2022-02-22 DIAGNOSIS — Z12.31 SCREENING MAMMOGRAM, ENCOUNTER FOR: Primary | ICD-10-CM

## 2022-02-25 ENCOUNTER — APPOINTMENT (OUTPATIENT)
Dept: MAMMOGRAPHY | Facility: HOSPITAL | Age: 72
End: 2022-02-25

## 2022-02-28 ENCOUNTER — HOSPITAL ENCOUNTER (OUTPATIENT)
Dept: MAMMOGRAPHY | Facility: HOSPITAL | Age: 72
Discharge: HOME | End: 2022-02-28
Admitting: SPECIALIST

## 2022-02-28 DIAGNOSIS — Z12.31 SCREENING MAMMOGRAM, ENCOUNTER FOR: ICD-10-CM

## 2022-02-28 PROCEDURE — 77063 BREAST TOMOSYNTHESIS BI: CPT

## 2022-02-28 PROCEDURE — 77067 SCR MAMMO BI INCL CAD: CPT

## 2022-03-11 ENCOUNTER — OFFICE VISIT (OUTPATIENT)
Dept: FAMILY MEDICINE CLINIC | Facility: CLINIC | Age: 72
End: 2022-03-11

## 2022-03-11 VITALS
BODY MASS INDEX: 31.27 KG/M2 | DIASTOLIC BLOOD PRESSURE: 80 MMHG | TEMPERATURE: 98 F | HEART RATE: 78 BPM | SYSTOLIC BLOOD PRESSURE: 136 MMHG | OXYGEN SATURATION: 98 % | WEIGHT: 199.2 LBS | HEIGHT: 67 IN

## 2022-03-11 DIAGNOSIS — R35.0 URINARY FREQUENCY: Primary | ICD-10-CM

## 2022-03-11 DIAGNOSIS — R39.15 URINARY URGENCY: ICD-10-CM

## 2022-03-11 LAB
BACTERIA UR QL AUTO: ABNORMAL /HPF
BILIRUB UR QL STRIP: NEGATIVE
CLARITY UR: CLEAR
COLOR UR: YELLOW
GLUCOSE UR STRIP-MCNC: NEGATIVE MG/DL
HGB UR QL STRIP.AUTO: ABNORMAL
KETONES UR QL STRIP: NEGATIVE
LEUKOCYTE ESTERASE UR QL STRIP.AUTO: ABNORMAL
NITRITE UR QL STRIP: NEGATIVE
PH UR STRIP.AUTO: 6.5 [PH] (ref 4.6–8)
PROT UR QL STRIP: NEGATIVE
RBC # UR STRIP: ABNORMAL /HPF
REF LAB TEST METHOD: ABNORMAL
SP GR UR STRIP: 1.01 (ref 1–1.03)
SQUAMOUS #/AREA URNS HPF: ABNORMAL /HPF
UROBILINOGEN UR QL STRIP: ABNORMAL
WBC # UR STRIP: ABNORMAL /HPF

## 2022-03-11 PROCEDURE — 99213 OFFICE O/P EST LOW 20 MIN: CPT | Performed by: NURSE PRACTITIONER

## 2022-03-11 PROCEDURE — 87086 URINE CULTURE/COLONY COUNT: CPT | Performed by: NURSE PRACTITIONER

## 2022-03-11 PROCEDURE — 81001 URINALYSIS AUTO W/SCOPE: CPT | Performed by: NURSE PRACTITIONER

## 2022-03-11 RX ORDER — LORATADINE 10 MG/1
10 TABLET ORAL DAILY
COMMUNITY
End: 2022-05-19 | Stop reason: ALTCHOICE

## 2022-03-11 RX ORDER — SULFAMETHOXAZOLE AND TRIMETHOPRIM 800; 160 MG/1; MG/1
1 TABLET ORAL 2 TIMES DAILY
Qty: 14 TABLET | Refills: 0 | Status: SHIPPED | OUTPATIENT
Start: 2022-03-11 | End: 2022-05-19

## 2022-03-12 LAB — BACTERIA SPEC AEROBE CULT: NO GROWTH

## 2022-03-29 ENCOUNTER — TRANSCRIBE ORDERS (OUTPATIENT)
Dept: ADMINISTRATIVE | Facility: HOSPITAL | Age: 72
End: 2022-03-29

## 2022-03-29 DIAGNOSIS — Z78.0 MENOPAUSE: Primary | ICD-10-CM

## 2022-05-19 ENCOUNTER — OFFICE VISIT (OUTPATIENT)
Dept: FAMILY MEDICINE CLINIC | Facility: CLINIC | Age: 72
End: 2022-05-19

## 2022-05-19 VITALS
BODY MASS INDEX: 31.39 KG/M2 | HEART RATE: 78 BPM | DIASTOLIC BLOOD PRESSURE: 70 MMHG | SYSTOLIC BLOOD PRESSURE: 102 MMHG | OXYGEN SATURATION: 97 % | WEIGHT: 200 LBS | HEIGHT: 67 IN | RESPIRATION RATE: 18 BRPM | TEMPERATURE: 97.6 F

## 2022-05-19 DIAGNOSIS — Z13.220 LIPID SCREENING: ICD-10-CM

## 2022-05-19 DIAGNOSIS — Z86.010 HISTORY OF COLON POLYPS: ICD-10-CM

## 2022-05-19 DIAGNOSIS — R79.9 ABNORMAL FINDING OF BLOOD CHEMISTRY, UNSPECIFIED: ICD-10-CM

## 2022-05-19 DIAGNOSIS — Z00.00 MEDICARE ANNUAL WELLNESS VISIT, SUBSEQUENT: Primary | ICD-10-CM

## 2022-05-19 DIAGNOSIS — M54.40 ACUTE RIGHT-SIDED LOW BACK PAIN WITH SCIATICA, SCIATICA LATERALITY UNSPECIFIED: ICD-10-CM

## 2022-05-19 DIAGNOSIS — E55.9 VITAMIN D DEFICIENCY: ICD-10-CM

## 2022-05-19 DIAGNOSIS — M54.2 NECK PAIN: ICD-10-CM

## 2022-05-19 LAB
25(OH)D3 SERPL-MCNC: 38.5 NG/ML (ref 30–100)
ALBUMIN SERPL-MCNC: 4.4 G/DL (ref 3.5–5.2)
ALBUMIN/GLOB SERPL: 1.7 G/DL
ALP SERPL-CCNC: 67 U/L (ref 39–117)
ALT SERPL W P-5'-P-CCNC: 14 U/L (ref 1–33)
ANION GAP SERPL CALCULATED.3IONS-SCNC: 10 MMOL/L (ref 5–15)
AST SERPL-CCNC: 13 U/L (ref 1–32)
BILIRUB SERPL-MCNC: 0.4 MG/DL (ref 0–1.2)
BUN SERPL-MCNC: 10 MG/DL (ref 8–23)
BUN/CREAT SERPL: 13.2 (ref 7–25)
CALCIUM SPEC-SCNC: 9.5 MG/DL (ref 8.6–10.5)
CHLORIDE SERPL-SCNC: 107 MMOL/L (ref 98–107)
CHOLEST SERPL-MCNC: 154 MG/DL (ref 0–200)
CO2 SERPL-SCNC: 25 MMOL/L (ref 22–29)
CREAT SERPL-MCNC: 0.76 MG/DL (ref 0.57–1)
DEPRECATED RDW RBC AUTO: 43.7 FL (ref 37–54)
EGFRCR SERPLBLD CKD-EPI 2021: 84.4 ML/MIN/1.73
ERYTHROCYTE [DISTWIDTH] IN BLOOD BY AUTOMATED COUNT: 12.6 % (ref 12.3–15.4)
GLOBULIN UR ELPH-MCNC: 2.6 GM/DL
GLUCOSE SERPL-MCNC: 96 MG/DL (ref 65–99)
HCT VFR BLD AUTO: 42.4 % (ref 34–46.6)
HDLC SERPL-MCNC: 54 MG/DL (ref 40–60)
HGB BLD-MCNC: 13.7 G/DL (ref 12–15.9)
LDLC SERPL CALC-MCNC: 86 MG/DL (ref 0–100)
LDLC/HDLC SERPL: 1.6 {RATIO}
MCH RBC QN AUTO: 30.1 PG (ref 26.6–33)
MCHC RBC AUTO-ENTMCNC: 32.3 G/DL (ref 31.5–35.7)
MCV RBC AUTO: 93.2 FL (ref 79–97)
PLATELET # BLD AUTO: 254 10*3/MM3 (ref 140–450)
PMV BLD AUTO: 9.7 FL (ref 6–12)
POTASSIUM SERPL-SCNC: 4.6 MMOL/L (ref 3.5–5.2)
PROT SERPL-MCNC: 7 G/DL (ref 6–8.5)
RBC # BLD AUTO: 4.55 10*6/MM3 (ref 3.77–5.28)
SODIUM SERPL-SCNC: 142 MMOL/L (ref 136–145)
TRIGL SERPL-MCNC: 69 MG/DL (ref 0–150)
VLDLC SERPL-MCNC: 14 MG/DL (ref 5–40)
WBC NRBC COR # BLD: 5.5 10*3/MM3 (ref 3.4–10.8)

## 2022-05-19 PROCEDURE — 1159F MED LIST DOCD IN RCRD: CPT | Performed by: INTERNAL MEDICINE

## 2022-05-19 PROCEDURE — 80053 COMPREHEN METABOLIC PANEL: CPT | Performed by: INTERNAL MEDICINE

## 2022-05-19 PROCEDURE — 80061 LIPID PANEL: CPT | Performed by: INTERNAL MEDICINE

## 2022-05-19 PROCEDURE — 85027 COMPLETE CBC AUTOMATED: CPT | Performed by: INTERNAL MEDICINE

## 2022-05-19 PROCEDURE — G0439 PPPS, SUBSEQ VISIT: HCPCS | Performed by: INTERNAL MEDICINE

## 2022-05-19 PROCEDURE — 99214 OFFICE O/P EST MOD 30 MIN: CPT | Performed by: INTERNAL MEDICINE

## 2022-05-19 PROCEDURE — 1170F FXNL STATUS ASSESSED: CPT | Performed by: INTERNAL MEDICINE

## 2022-05-19 PROCEDURE — 82306 VITAMIN D 25 HYDROXY: CPT | Performed by: INTERNAL MEDICINE

## 2022-05-19 PROCEDURE — 36415 COLL VENOUS BLD VENIPUNCTURE: CPT | Performed by: INTERNAL MEDICINE

## 2022-05-19 RX ORDER — CETIRIZINE HYDROCHLORIDE 10 MG/1
10 TABLET ORAL DAILY
COMMUNITY

## 2022-05-19 NOTE — PATIENT INSTRUCTIONS
Medicare Wellness  Personal Prevention Plan of Service     Date of Office Visit:    Encounter Provider:  Brian Khanna MD  Place of Service:  [facility]  Patient Name: Adelia Morris  :  1950    As part of the Medicare Wellness portion of your visit today, we are providing you with this personalized preventive plan of services (PPPS). This plan is based upon recommendations of the [place] Preventive Services Task Force (USPSTF) and the Advisory Committee on Immunization Practices (ACIP).    This lists the preventive care services that should be considered, and provides dates of when you are due. Items listed as completed are up-to-date and do not require any further intervention.    Health Maintenance   Topic Date Due    PAP SMEAR  Never done    DXA SCAN  2019    COVID-19 Vaccine (3 - Booster for Pfizer series) 2021    INFLUENZA VACCINE  2022    ANNUAL WELLNESS VISIT  2023    MAMMOGRAM  2024    COLORECTAL CANCER SCREENING  2026    TDAP/TD VACCINES (2 - Td or Tdap) 2031    HEPATITIS C SCREENING  Completed    Pneumococcal Vaccine 65+  Completed    ZOSTER VACCINE  Completed       No orders of the defined types were placed in this encounter.      No follow-ups on file.

## 2022-05-19 NOTE — PROGRESS NOTES
QUICK REFERENCE INFORMATION:  The ABCs of the Annual Wellness Visit    Subsequent Medicare Wellness Visit patient was seen for Medicare wellness exam.  Patient was seen for low back pain.  Patient has a history of low back pain and is using stretching and physical activity.  Patient has much better and she has much more mobility.  Patient was pulling up a floor and developed slight neck pain.  Patient's muscles appear tight and she will begin stretching exercises.  Patient did not want physical therapy at this time.  Does have a history of polyps but does not need a second colonoscopy for his couple years.  Patient does have a vitamin D3 deficiency is supplemented with 5000 units vitamin D3 daily.  Previous vitamin D level was 51.  He is having labs redrawn today.  Previous triglycerides 57, HDL 55, LDL 97, blood sugar 87, BUN 8, creatinine 0.4.  Patient is having labs today and will follow-up.    Using the dragon to dictate note    HEALTH RISK ASSESSMENT    1950    Recent Hospitalizations:  No hospitalization(s) within the last year..        Current Medical Providers:  Patient Care Team:  Brian Khanna MD as PCP - General (Internal Medicine)  Lacy Avila MD as Consulting Physician (Gynecology)  Rafael Espinoza MD as Consulting Physician (Dermatology)        Smoking Status:  Social History     Tobacco Use   Smoking Status Never Smoker   Smokeless Tobacco Never Used       Alcohol Consumption:  Social History     Substance and Sexual Activity   Alcohol Use No       Depression Screen:   PHQ-2/PHQ-9 Depression Screening 5/19/2022   Retired PHQ-9 Total Score -   Retired Total Score -   Little Interest or Pleasure in Doing Things 0-->not at all   Feeling Down, Depressed or Hopeless 0-->not at all   PHQ-9: Brief Depression Severity Measure Score 0       Health Habits and Functional and Cognitive Screening:  Functional & Cognitive Status 5/19/2022   Do you have difficulty preparing food and eating? No   Do you  have difficulty bathing yourself, getting dressed or grooming yourself? No   Do you have difficulty using the toilet? No   Do you have difficulty moving around from place to place? No   Do you have trouble with steps or getting out of a bed or a chair? No   Current Diet Well Balanced Diet   Dental Exam Up to date   Eye Exam Up to date   Exercise (times per week) 7 times per week   Current Exercises Include Aerobics   Current Exercise Activities Include -   Do you need help using the phone?  No   Are you deaf or do you have serious difficulty hearing?  No   Do you need help with transportation? No   Do you need help shopping? No   Do you need help preparing meals?  No   Do you need help with housework?  No   Do you need help with laundry? No   Do you need help taking your medications? No   Do you need help managing money? No   Do you ever drive or ride in a car without wearing a seat belt? No   Have you felt unusual stress, anger or loneliness in the last month? No   Who do you live with? Spouse   If you need help, do you have trouble finding someone available to you? No   Have you been bothered in the last four weeks by sexual problems? No   Do you have difficulty concentrating, remembering or making decisions? No           Does the patient have evidence of cognitive impairment? No    Aspirin use counseling: Does not need ASA (and currently is not on it)      Recent Lab Results:  CMP:  Lab Results   Component Value Date    BUN 8 11/17/2021    CREATININE 0.44 (L) 11/17/2021    EGFRIFNONA 141 11/17/2021    EGFRIFAFRI 88 11/07/2018    BCR 18.2 11/17/2021     11/17/2021    K 4.1 11/17/2021    CO2 25.2 11/17/2021    CALCIUM 9.6 11/17/2021    PROTENTOTREF 7.0 11/07/2018    ALBUMIN 4.90 11/17/2021    LABGLOBREF 2.8 11/07/2018    LABIL2 1.5 11/07/2018    BILITOT 0.3 11/17/2021    ALKPHOS 71 11/17/2021    AST 13 11/17/2021    ALT 11 11/17/2021     Lipid Panel:  Lab Results   Component Value Date    CHOL 163 11/17/2021     TRIG 57 11/17/2021    HDL 55 11/17/2021    VLDL 11 11/17/2021    LDLHDL 1.76 11/17/2021     HbA1c:  Lab Results   Component Value Date    HGBA1C 5.50 10/24/2016       Visual Acuity:  No exam data present    Age-appropriate Screening Schedule:  Refer to the list below for future screening recommendations based on patient's age, sex and/or medical conditions. Orders for these recommended tests are listed in the plan section. The patient has been provided with a written plan.    Health Maintenance   Topic Date Due   • PAP SMEAR  Never done   • DXA SCAN  11/30/2019   • INFLUENZA VACCINE  08/01/2022   • MAMMOGRAM  02/28/2024   • TDAP/TD VACCINES (2 - Td or Tdap) 11/17/2031   • ZOSTER VACCINE  Completed        Subjective   History of Present Illness    Adelia Morris is a 71 y.o. female who presents for an Subsequent Wellness Visit.    The following portions of the patient's history were reviewed and updated as appropriate: allergies, current medications, past family history, past medical history, past social history, past surgical history and problem list.    Outpatient Medications Prior to Visit   Medication Sig Dispense Refill   • cetirizine (zyrTEC) 10 MG tablet Take 10 mg by mouth Daily.     • Cholecalciferol (VITAMIN D-3) 125 MCG (5000 UT) tablet Take  by mouth Daily.     • fluticasone (FLONASE) 50 MCG/ACT nasal spray 2 sprays by Each Nare route Daily. 16 mL 3   • Probiotic Product (PROBIOTIC PEARLS WOMENS PO) Take  by mouth.     • Acetaminophen (TYLENOL 8 HOUR PO) Take  by mouth As Needed.     • Calcium Citrate (CITRACAL PO) Take  by mouth.     • ibuprofen (ADVIL,MOTRIN) 200 MG tablet Take 200 mg by mouth Every 6 (Six) Hours As Needed for mild pain (1-3).     • loratadine (CLARITIN) 10 MG tablet Take 10 mg by mouth Daily.     • sulfamethoxazole-trimethoprim (BACTRIM DS,SEPTRA DS) 800-160 MG per tablet Take 1 tablet by mouth 2 (Two) Times a Day. 14 tablet 0     No facility-administered medications prior to  visit.       Patient Active Problem List   Diagnosis   • Healthcare maintenance   • Encounter for screening colonoscopy   • Colon polyp   • Acute right-sided low back pain with sciatica   • Allergic drug rash   • Osteopenia of hip   • Cutaneous cyst   • History of colon polyps       Advance Care Planning:  ACP discussion was held with the patient during this visit. Patient has an advance directive in EMR which is still valid.     Identification of Risk Factors:  Risk factors include: Advance Directive Discussion.    Review of Systems   Constitutional: Negative for fatigue and fever.   HENT: Positive for congestion. Negative for trouble swallowing.    Eyes: Negative for discharge and visual disturbance.   Respiratory: Negative for choking and shortness of breath.    Cardiovascular: Negative for chest pain and palpitations.   Gastrointestinal: Negative for abdominal pain and blood in stool.   Endocrine: Negative.    Genitourinary: Negative for genital sores and hematuria.   Musculoskeletal: Positive for back pain and neck pain. Negative for gait problem and joint swelling.   Skin: Negative for color change, pallor, rash and wound.   Allergic/Immunologic: Positive for environmental allergies. Negative for immunocompromised state.   Neurological: Negative for facial asymmetry and speech difficulty.   Psychiatric/Behavioral: Negative for hallucinations and suicidal ideas.       Compared to one year ago, the patient feels her physical health is better.  Compared to one year ago, the patient feels her mental health is better.    Objective     Physical Exam  Vitals and nursing note reviewed.   Constitutional:       Appearance: Normal appearance. She is well-developed.   HENT:      Head: Normocephalic and atraumatic.      Nose: Nose normal.      Mouth/Throat:      Mouth: Mucous membranes are moist.      Pharynx: Oropharynx is clear.   Eyes:      Extraocular Movements: Extraocular movements intact.      Conjunctiva/sclera:  "Conjunctivae normal.      Pupils: Pupils are equal, round, and reactive to light.   Cardiovascular:      Rate and Rhythm: Normal rate and regular rhythm.      Heart sounds: Normal heart sounds. No murmur heard.    No friction rub. No gallop.   Pulmonary:      Effort: Pulmonary effort is normal. No respiratory distress.      Breath sounds: Normal breath sounds. No stridor. No wheezing, rhonchi or rales.   Chest:      Chest wall: No tenderness.   Abdominal:      General: Bowel sounds are normal.      Palpations: Abdomen is soft.   Musculoskeletal:         General: Tenderness present. Normal range of motion.      Cervical back: Normal range of motion and neck supple.   Skin:     General: Skin is warm and dry.   Neurological:      General: No focal deficit present.      Mental Status: She is alert and oriented to person, place, and time. Mental status is at baseline.   Psychiatric:         Mood and Affect: Mood normal.         Behavior: Behavior normal.         Thought Content: Thought content normal.         Judgment: Judgment normal.         Vitals:    05/19/22 0914   BP: 102/70   Pulse: 78   Resp: 18   Temp: 97.6 °F (36.4 °C)   SpO2: 97%   Weight: 90.7 kg (200 lb)   Height: 170.2 cm (67\")       BMI is >= 30 and <= 34.9 (Class 1 obesity). The following options were offered after discussion: NA      Assessment & Plan #1 continue monitoring blood pressure #2 labs 3 continue present diet and activity level  Patient Self-Management and Personalized Health Advice  The patient has been provided with information about: NA and preventive services including:   · NA.    Visit Diagnoses:    ICD-10-CM ICD-9-CM   1. Medicare annual wellness visit, subsequent  Z00.00 V70.0   2. Acute right-sided low back pain with sciatica, sciatica laterality unspecified  M54.40 724.2     724.3   3. History of colon polyps  Z86.010 V12.72   4. Neck pain  M54.2 723.1   5. Vitamin D deficiency  E55.9 268.9   6. Lipid screening  Z13.220 V77.91   7. " Abnormal finding of blood chemistry, unspecified   R79.9 790.6       Orders Placed This Encounter   Procedures   • CBC (No Diff)     Standing Status:   Future     Standing Expiration Date:   5/19/2023     Order Specific Question:   Release to patient     Answer:   Immediate   • Comprehensive Metabolic Panel     Order Specific Question:   Release to patient     Answer:   Immediate   • Lipid Panel   • Vitamin D 25 Hydroxy     Order Specific Question:   Release to patient     Answer:   Immediate       Outpatient Encounter Medications as of 5/19/2022   Medication Sig Dispense Refill   • cetirizine (zyrTEC) 10 MG tablet Take 10 mg by mouth Daily.     • Cholecalciferol (VITAMIN D-3) 125 MCG (5000 UT) tablet Take  by mouth Daily.     • fluticasone (FLONASE) 50 MCG/ACT nasal spray 2 sprays by Each Nare route Daily. 16 mL 3   • Probiotic Product (PROBIOTIC PEARLS WOMENS PO) Take  by mouth.     • [DISCONTINUED] Acetaminophen (TYLENOL 8 HOUR PO) Take  by mouth As Needed.     • [DISCONTINUED] Calcium Citrate (CITRACAL PO) Take  by mouth.     • [DISCONTINUED] ibuprofen (ADVIL,MOTRIN) 200 MG tablet Take 200 mg by mouth Every 6 (Six) Hours As Needed for mild pain (1-3).     • [DISCONTINUED] loratadine (CLARITIN) 10 MG tablet Take 10 mg by mouth Daily.     • [DISCONTINUED] sulfamethoxazole-trimethoprim (BACTRIM DS,SEPTRA DS) 800-160 MG per tablet Take 1 tablet by mouth 2 (Two) Times a Day. 14 tablet 0     No facility-administered encounter medications on file as of 5/19/2022.       Reviewed use of high risk medication in the elderly: not applicable  Reviewed for potential of harmful drug interactions in the elderly: not applicable    Follow Up:  Return in about 6 months (around 11/19/2022), or if symptoms worsen or fail to improve, for Recheck.     An After Visit Summary and PPPS with all of these plans were given to the patient.

## 2022-09-19 ENCOUNTER — TELEPHONE (OUTPATIENT)
Dept: FAMILY MEDICINE CLINIC | Facility: CLINIC | Age: 72
End: 2022-09-19

## 2022-09-19 NOTE — TELEPHONE ENCOUNTER
HAS APPT ON WED AND NEEDS A REF SENT OVER. SHE IS BEING SEEN FOR A NECK STRAIN THAT SHE SAYS SHE TALKED TO DR STAPLETON ABOUT BACK IN MARCH.  [phone]

## 2022-09-20 DIAGNOSIS — M54.40 ACUTE RIGHT-SIDED LOW BACK PAIN WITH SCIATICA, SCIATICA LATERALITY UNSPECIFIED: Primary | ICD-10-CM

## 2022-09-21 ENCOUNTER — TELEPHONE (OUTPATIENT)
Dept: FAMILY MEDICINE CLINIC | Facility: CLINIC | Age: 72
End: 2022-09-21

## 2022-09-21 DIAGNOSIS — M54.2 NECK PAIN: Primary | ICD-10-CM

## 2022-09-21 DIAGNOSIS — G89.29 CHRONIC SHOULDER PAIN, UNSPECIFIED LATERALITY: ICD-10-CM

## 2022-09-21 DIAGNOSIS — M25.519 CHRONIC SHOULDER PAIN, UNSPECIFIED LATERALITY: ICD-10-CM

## 2022-09-21 NOTE — TELEPHONE ENCOUNTER
Caller: Adelia Morris    Relationship to patient: Self    Best call back number: [phone]    Patient is needing: PATIENT STATES THAT SHE WAS SEEN TODAY FOR HER PHYSICAL THERAPY EVALUATION. HOWEVER THE ORDER THAT WAS SEEN TO [facility] WAS FOR HER LOWER BACK. THE ORDER SHOULD HAVE BEEN FOR HER NECK AND SHOULDERS. PATIENT WAS STILL ABLE TO HAVE THE EVALUATION DONE FOR HER NECK AND SHOULDERS. HOWEVER, [facility] IS REQUESTING THE CORRECT ORDER TO BE SENT WITH TODAYS DATE (9/21/22) AND CORRECT BODY AREA. THIS WILL PREVENT ANY ISSUES WITH INSURANCE COVERAGE IN THE FUTURE. PLEASE ADVISE.

## 2022-10-06 ENCOUNTER — HOSPITAL ENCOUNTER (OUTPATIENT)
Dept: BONE DENSITY | Facility: HOSPITAL | Age: 72
Discharge: HOME | End: 2022-10-06
Admitting: SPECIALIST

## 2022-10-06 DIAGNOSIS — Z78.0 MENOPAUSE: ICD-10-CM

## 2022-10-06 PROCEDURE — 77080 DXA BONE DENSITY AXIAL: CPT

## 2022-11-02 ENCOUNTER — TELEPHONE (OUTPATIENT)
Dept: FAMILY MEDICINE CLINIC | Facility: CLINIC | Age: 72
End: 2022-11-02

## 2022-11-02 NOTE — TELEPHONE ENCOUNTER
Caller: Adelia Morris    Relationship: Self    Best call back number: [phone]    What is the best time to reach you: ANY    Who are you requesting to speak with (clinical staff, provider,  specific staff member): CLINICAL STAFF      What was the call regarding: PATIENT WANTED TO INFORM THE OFFICE THAT SHE IS STARTING PHYSICAL THERAPY AT [facility] FOR HER LOWER BACK PAIN. AN ORDER WILL BE BEING FAXED.    Do you require a callback: NO

## 2022-11-30 ENCOUNTER — OFFICE VISIT (OUTPATIENT)
Dept: FAMILY MEDICINE CLINIC | Facility: CLINIC | Age: 72
End: 2022-11-30

## 2022-11-30 VITALS
TEMPERATURE: 98 F | HEART RATE: 80 BPM | SYSTOLIC BLOOD PRESSURE: 112 MMHG | BODY MASS INDEX: 30.98 KG/M2 | OXYGEN SATURATION: 96 % | HEIGHT: 67 IN | WEIGHT: 197.4 LBS | DIASTOLIC BLOOD PRESSURE: 84 MMHG

## 2022-11-30 DIAGNOSIS — E55.9 VITAMIN D DEFICIENCY: ICD-10-CM

## 2022-11-30 DIAGNOSIS — E78.5 DYSLIPIDEMIA: Primary | ICD-10-CM

## 2022-11-30 DIAGNOSIS — E78.5 DYSLIPIDEMIA: ICD-10-CM

## 2022-11-30 PROCEDURE — 99213 OFFICE O/P EST LOW 20 MIN: CPT | Performed by: INTERNAL MEDICINE

## 2022-11-30 NOTE — PROGRESS NOTES
"Chief Complaint  Follow-up    Subjective        Adelia Morris presents to [facility]  History of Present Illness patient was seen for dyslipidemia.  Patient is using diet and exercise to control her cholesterol.  Triglycerides 57, HDL 55, LDL 97.  Patient will continue present treatment.  Patient does have vitamin D3 deficiency.  Patient is using over-the-counter D3.  Previous vitamin D level was 51.  Labs were drawn 1 year ago.  Labs are being drawn today.  Patient will follow-up.    Dictated utilizing Dragon dictation. If there are questions or for further clarification, please contact me.    Objective   Vital Signs:  Blood Pressure 112/84 (BP Location: Left arm, Patient Position: Sitting, Cuff Size: Large Adult)   Pulse 80   Temperature 98 °F (36.7 °C) (Infrared)   Height 170.2 cm (67\")   Weight 89.5 kg (197 lb 6.4 oz)   Oxygen Saturation 96%   Body Mass Index 30.92 kg/m²   Estimated body mass index is 30.92 kg/m² as calculated from the following:    Height as of this encounter: 170.2 cm (67\").    Weight as of this encounter: 89.5 kg (197 lb 6.4 oz).          Physical Exam  Vitals and nursing note reviewed.   Constitutional:       Appearance: Normal appearance. She is well-developed.   HENT:      Head: Normocephalic and atraumatic.      Nose: Nose normal.      Mouth/Throat:      Mouth: Mucous membranes are moist.      Pharynx: Oropharynx is clear.   Eyes:      Extraocular Movements: Extraocular movements intact.      Conjunctiva/sclera: Conjunctivae normal.      Pupils: Pupils are equal, round, and reactive to light.   Cardiovascular:      Rate and Rhythm: Normal rate and regular rhythm.      Heart sounds: Normal heart sounds. No murmur heard.    No friction rub. No gallop.   Pulmonary:      Effort: Pulmonary effort is normal. No respiratory distress.      Breath sounds: Normal breath sounds. No stridor. No wheezing, rhonchi or rales.   Chest:      Chest wall: No " tenderness.   Abdominal:      General: Bowel sounds are normal.      Palpations: Abdomen is soft.   Musculoskeletal:         General: Normal range of motion.      Cervical back: Normal range of motion and neck supple.   Skin:     General: Skin is warm and dry.   Neurological:      General: No focal deficit present.      Mental Status: She is alert and oriented to person, place, and time. Mental status is at baseline.   Psychiatric:         Mood and Affect: Mood normal.         Behavior: Behavior normal.         Thought Content: Thought content normal.         Judgment: Judgment normal.        Result Review :                Assessment and Plan  #1 continue present diet and activity level #2 labs  Diagnoses and all orders for this visit:    1. Dyslipidemia (Primary)  -     Cancel: CBC (No Diff); Future  -     Cancel: Comprehensive Metabolic Panel; Future  -     Cancel: Lipid Panel; Future  -     Cancel: Vitamin D,25-Hydroxy; Future  -     Vitamin D,25-Hydroxy; Future  -     CBC (No Diff); Future  -     Comprehensive Metabolic Panel; Future  -     Lipid Panel; Future    2. Vitamin D deficiency  -     Cancel: CBC (No Diff); Future  -     Cancel: Comprehensive Metabolic Panel; Future  -     Cancel: Lipid Panel; Future  -     Cancel: Vitamin D,25-Hydroxy; Future  -     Vitamin D,25-Hydroxy; Future  -     CBC (No Diff); Future  -     Comprehensive Metabolic Panel; Future  -     Lipid Panel; Future             Follow Up   Return in about 6 months (around 5/30/2023), or if symptoms worsen or fail to improve, for Recheck.  Patient was given instructions and counseling regarding her condition or for health maintenance advice. Please see specific information pulled into the AVS if appropriate.

## 2022-12-01 LAB
25(OH)D3+25(OH)D2 SERPL-MCNC: 48.5 NG/ML (ref 30–100)
ALBUMIN SERPL-MCNC: 4.5 G/DL (ref 3.7–4.7)
ALBUMIN/GLOB SERPL: 1.7 {RATIO} (ref 1.2–2.2)
ALP SERPL-CCNC: 67 IU/L (ref 44–121)
ALT SERPL-CCNC: 13 IU/L (ref 0–32)
AST SERPL-CCNC: 15 IU/L (ref 0–40)
BILIRUB SERPL-MCNC: 0.4 MG/DL (ref 0–1.2)
BUN SERPL-MCNC: 10 MG/DL (ref 8–27)
BUN/CREAT SERPL: 14 (ref 12–28)
CALCIUM SERPL-MCNC: 9.8 MG/DL (ref 8.7–10.3)
CHLORIDE SERPL-SCNC: 106 MMOL/L (ref 96–106)
CHOLEST SERPL-MCNC: 151 MG/DL (ref 100–199)
CO2 SERPL-SCNC: 25 MMOL/L (ref 20–29)
CREAT SERPL-MCNC: 0.73 MG/DL (ref 0.57–1)
EGFRCR SERPLBLD CKD-EPI 2021: 88 ML/MIN/1.73
ERYTHROCYTE [DISTWIDTH] IN BLOOD BY AUTOMATED COUNT: 12.3 % (ref 11.7–15.4)
GLOBULIN SER CALC-MCNC: 2.6 G/DL (ref 1.5–4.5)
GLUCOSE SERPL-MCNC: 95 MG/DL (ref 70–99)
HCT VFR BLD AUTO: 40.5 % (ref 34–46.6)
HDLC SERPL-MCNC: 51 MG/DL
HGB BLD-MCNC: 14.1 G/DL (ref 11.1–15.9)
LDLC SERPL CALC-MCNC: 87 MG/DL (ref 0–99)
MCH RBC QN AUTO: 31.3 PG (ref 26.6–33)
MCHC RBC AUTO-ENTMCNC: 34.8 G/DL (ref 31.5–35.7)
MCV RBC AUTO: 90 FL (ref 79–97)
PLATELET # BLD AUTO: 264 X10E3/UL (ref 150–450)
POTASSIUM SERPL-SCNC: 4.8 MMOL/L (ref 3.5–5.2)
PROT SERPL-MCNC: 7.1 G/DL (ref 6–8.5)
RBC # BLD AUTO: 4.5 X10E6/UL (ref 3.77–5.28)
SODIUM SERPL-SCNC: 142 MMOL/L (ref 134–144)
TRIGL SERPL-MCNC: 65 MG/DL (ref 0–149)
VLDLC SERPL CALC-MCNC: 13 MG/DL (ref 5–40)
WBC # BLD AUTO: 5.3 X10E3/UL (ref 3.4–10.8)

## 2023-01-12 ENCOUNTER — TRANSCRIBE ORDERS (OUTPATIENT)
Dept: ADMINISTRATIVE | Facility: HOSPITAL | Age: 73
End: 2023-01-12
Payer: MEDICARE

## 2023-01-12 DIAGNOSIS — Z12.31 VISIT FOR SCREENING MAMMOGRAM: Primary | ICD-10-CM

## 2023-02-02 ENCOUNTER — OFFICE VISIT (OUTPATIENT)
Dept: FAMILY MEDICINE CLINIC | Facility: CLINIC | Age: 73
End: 2023-02-02
Payer: MEDICARE

## 2023-02-02 VITALS
OXYGEN SATURATION: 97 % | HEIGHT: 67 IN | DIASTOLIC BLOOD PRESSURE: 70 MMHG | SYSTOLIC BLOOD PRESSURE: 120 MMHG | HEART RATE: 94 BPM | BODY MASS INDEX: 31.39 KG/M2 | TEMPERATURE: 98.5 F | WEIGHT: 200 LBS | RESPIRATION RATE: 18 BRPM

## 2023-02-02 DIAGNOSIS — R42 VERTIGO: Primary | ICD-10-CM

## 2023-02-02 DIAGNOSIS — H61.22 IMPACTED CERUMEN OF LEFT EAR: ICD-10-CM

## 2023-02-02 DIAGNOSIS — J01.90 ACUTE NON-RECURRENT SINUSITIS, UNSPECIFIED LOCATION: ICD-10-CM

## 2023-02-02 PROCEDURE — 99213 OFFICE O/P EST LOW 20 MIN: CPT

## 2023-02-02 PROCEDURE — 69210 REMOVE IMPACTED EAR WAX UNI: CPT

## 2023-02-02 RX ORDER — MECLIZINE HYDROCHLORIDE 25 MG/1
25 TABLET ORAL 3 TIMES DAILY PRN
Qty: 30 TABLET | Refills: 1 | Status: SHIPPED | OUTPATIENT
Start: 2023-02-02

## 2023-02-02 RX ORDER — FLUCONAZOLE 150 MG/1
150 TABLET ORAL ONCE
Qty: 1 TABLET | Refills: 0 | Status: SHIPPED | OUTPATIENT
Start: 2023-02-02 | End: 2023-02-02

## 2023-02-02 RX ORDER — AMOXICILLIN 500 MG/1
500 TABLET, FILM COATED ORAL 2 TIMES DAILY
Qty: 6 TABLET | Refills: 0 | Status: SHIPPED | OUTPATIENT
Start: 2023-02-02 | End: 2023-02-05

## 2023-02-02 NOTE — PROGRESS NOTES
"Chief Complaint  Dizziness (HAD VERTIGO A LONG TIME AGO/ FEELS TIGHT IN SINUSES AND RIGHT EAR. MECLIZINE WORKED IN PAST)    Subjective        Adelia Morris presents to [facility]  History of Present Illness  Patient is a 72-year-old female, patient of Dr. Khanna, new to me.  Patient here today with complaints dizziness that started last Thursday along with maxillary and frontal sinus pressure and right-sided earache.  Patient reports the dizziness is worse in the morning when she first wakes up and stands up.  Patient denies room spinning dizziness, nausea, or vomiting.  Patient denies any strokelike symptoms.  Patient has been using flonase and patient has been completing saline washes daily with boiled water, salt, and straw/syringe. Patient takes zyrtec daily.  Patient reports a history of vertigo about 30 years which was relieved by meclizine.  Objective   Vital Signs:  /70 (BP Location: Left arm, Patient Position: Sitting, Cuff Size: Large Adult)   Pulse 94   Temp 98.5 °F (36.9 °C) (Infrared)   Resp 18   Ht 170.2 cm (67\")   Wt 90.7 kg (200 lb)   SpO2 97%   BMI 31.32 kg/m²   Estimated body mass index is 31.32 kg/m² as calculated from the following:    Height as of this encounter: 170.2 cm (67\").    Weight as of this encounter: 90.7 kg (200 lb).             Physical Exam  Constitutional:       General: She is awake.      Appearance: Normal appearance.   HENT:      Head: Normocephalic and atraumatic.      Right Ear: Tympanic membrane normal.      Left Ear: There is impacted cerumen.      Ears:      Comments: Left TM normal after disimpaction with curette     Nose: Rhinorrhea present.      Right Turbinates: Swollen.      Left Turbinates: Swollen.      Mouth/Throat:      Pharynx: Posterior oropharyngeal erythema present.   Eyes:      Extraocular Movements: Extraocular movements intact.      Conjunctiva/sclera: Conjunctivae normal.      Pupils: Pupils are equal, round, " and reactive to light.   Cardiovascular:      Rate and Rhythm: Normal rate and regular rhythm.      Pulses: Normal pulses.      Heart sounds: Normal heart sounds.   Pulmonary:      Effort: Pulmonary effort is normal.      Breath sounds: Normal breath sounds and air entry.   Skin:     General: Skin is warm and dry.   Neurological:      General: No focal deficit present.      Mental Status: She is alert and oriented to person, place, and time. Mental status is at baseline.   Psychiatric:         Attention and Perception: Attention normal.         Behavior: Behavior normal. Behavior is cooperative.        Result Review :            Ear Cerumen Removal    Date/Time: 2/2/2023 3:16 PM  Performed by: Leatha Ortega APRN  Authorized by: Leatha Ortega APRN   Consent: Verbal consent not obtained.    Anesthesia:  Local Anesthetic: none  Location details: left ear  Patient tolerance: patient tolerated the procedure well with no immediate complications  Procedure type: instrumentation, curette   Sedation:  Patient sedated: no              Assessment and Plan   Diagnoses and all orders for this visit:    1. Vertigo (Primary)  -     meclizine (ANTIVERT) 25 MG tablet; Take 1 tablet by mouth 3 (Three) Times a Day As Needed for Dizziness.  Dispense: 30 tablet; Refill: 1    2. Acute non-recurrent sinusitis, unspecified location  -     amoxicillin (AMOXIL) 500 MG tablet; Take 1 tablet by mouth 2 (Two) Times a Day for 3 days.  Dispense: 6 tablet; Refill: 0    3. Impacted cerumen of left ear  -     Cerumen Removal    Other orders  -     fluconazole (Diflucan) 150 MG tablet; Take 1 tablet by mouth 1 (One) Time for 1 dose.  Dispense: 1 tablet; Refill: 0    You may take meclizine up to 3 times a day as needed for vertigo.  If you experience room spinning dizziness along with headache, visual changes, nausea, or vomiting, please go to the nearest emergency room.    You may take amoxicillin 2 times a day for 3 days.    Patient agrees  with plan of care and understands instructions. Call if worsening symptoms or any problems or concerns.          Follow Up   Return if symptoms worsen or fail to improve.  Patient was given instructions and counseling regarding her condition or for health maintenance advice. Please see specific information pulled into the AVS if appropriate.

## 2023-02-02 NOTE — PATIENT INSTRUCTIONS
You may take meclizine up to 3 times a day as needed for vertigo.  If you experience room spinning dizziness along with headache, visual changes, nausea, or vomiting, please go to the nearest emergency room.    You may take amoxicillin 2 times a day for 3 days.    Patient agrees with plan of care and understands instructions. Call if worsening symptoms or any problems or concerns.

## 2023-03-01 ENCOUNTER — HOSPITAL ENCOUNTER (OUTPATIENT)
Dept: MAMMOGRAPHY | Facility: HOSPITAL | Age: 73
Discharge: HOME | End: 2023-03-01
Admitting: SPECIALIST
Payer: MEDICARE

## 2023-03-01 DIAGNOSIS — Z12.31 VISIT FOR SCREENING MAMMOGRAM: ICD-10-CM

## 2023-03-01 PROCEDURE — 77067 SCR MAMMO BI INCL CAD: CPT

## 2023-03-01 PROCEDURE — 77063 BREAST TOMOSYNTHESIS BI: CPT

## 2023-04-19 ENCOUNTER — OFFICE VISIT (OUTPATIENT)
Dept: INTERNAL MEDICINE | Facility: CLINIC | Age: 73
End: 2023-04-19
Payer: MEDICARE

## 2023-04-19 VITALS
SYSTOLIC BLOOD PRESSURE: 134 MMHG | HEIGHT: 67 IN | HEART RATE: 79 BPM | BODY MASS INDEX: 31.23 KG/M2 | OXYGEN SATURATION: 98 % | DIASTOLIC BLOOD PRESSURE: 80 MMHG | WEIGHT: 199 LBS

## 2023-04-19 DIAGNOSIS — M85.851 OSTEOPENIA OF BOTH HIPS: Primary | ICD-10-CM

## 2023-04-19 DIAGNOSIS — M85.852 OSTEOPENIA OF BOTH HIPS: Primary | ICD-10-CM

## 2023-04-19 DIAGNOSIS — T78.40XA ALLERGY, INITIAL ENCOUNTER: ICD-10-CM

## 2023-04-19 NOTE — PROGRESS NOTES
Christian Kovacs D.O.  Internal Medicine  [facility]  [address], [address]  [place] [address]  [phone]      Chief Complaint  Establish Care and osteopenia    SUBJECTIVE    History of Present Illness    Adelia Morris is a 72 y.o. female who presents to the office today as an established patient that last saw me on Visit date not found.   Patient is transferring care from Dr Khanna.     Osteopenia: currently taking vitamin D and calcium, states medications for bone density have been mentioned to her before but she is very weary of the side effects and decided to not use them.     Allergies: takes zyrtec daily and flonase as needed    Allergies   Allergen Reactions   • Bactrim [Sulfamethoxazole-Trimethoprim] Rash   • Clindamycin/Lincomycin Rash        Outpatient Medications Marked as Taking for the 23 encounter (Office Visit) with Christian Kovacs,    Medication Sig Dispense Refill   • Calcium Carbonate-Vitamin D (CALTRATE 600+D PO) Take  by mouth.     • cetirizine (zyrTEC) 10 MG tablet Take 1 tablet by mouth Daily.     • fluticasone (FLONASE) 50 MCG/ACT nasal spray 2 sprays by Each Nare route Daily. 16 mL 3   • Probiotic Product (PROBIOTIC PEARLS WOMENS PO) Take  by mouth.          Past Medical History:   Diagnosis Date   • Allergies    • Colon polyps    • Hemorrhoids    • Low back pain with right-sided sciatica    • Menopause    • Osteopenia    • UTI (urinary tract infection)      Past Surgical History:   Procedure Laterality Date   • BUNIONECTOMY      right   •  SECTION  1993   • COLONOSCOPY N/A 2016    Procedure: COLONOSCOPY ith polypectomy (cold bx);  Surgeon: Jarrod John Jr., MD;  Location: [facility] ENDOSCOPY;  Service:    • COLONOSCOPY      WNL PER PT     • COLONOSCOPY N/A 2021    Procedure: COLONOSCOPY TO CECUM AND TI;  Surgeon: Jarrod John Jr., MD;  Location: [facility] ENDOSCOPY;  Service: General;  Laterality: N/A;  pre:  "history of colon polyps  post: diverticulosis   • EYE SURGERY      cataracts   • LAPAROSCOPIC TUBAL LIGATION       Family History   Problem Relation Age of Onset   • No Known Problems Mother    • Cancer Father         BONE MARROW   • Hypertension Sister    • Thyroid disease Sister     reports that she has never smoked. She has never used smokeless tobacco. She reports that she does not drink alcohol and does not use drugs.    OBJECTIVE    Vital Signs:   /80   Pulse 79   Ht 170.2 cm (67\")   Wt 90.3 kg (199 lb)   SpO2 98%   BMI 31.17 kg/m²     Physical Exam  Vitals reviewed.   Constitutional:       General: She is not in acute distress.     Appearance: Normal appearance. She is obese. She is not ill-appearing.   HENT:      Head: Normocephalic and atraumatic.   Eyes:      General: No scleral icterus.  Cardiovascular:      Rate and Rhythm: Normal rate and regular rhythm.      Heart sounds: Normal heart sounds. No murmur heard.  Pulmonary:      Effort: Pulmonary effort is normal. No respiratory distress.      Breath sounds: Normal breath sounds. No wheezing or rhonchi.   Musculoskeletal:      Right lower leg: No edema.      Left lower leg: No edema.   Neurological:      Mental Status: She is alert.   Psychiatric:         Mood and Affect: Mood normal.         Behavior: Behavior normal.         Thought Content: Thought content normal.            The following data was reviewed by: Christian Kovacs DO on 04/19/2023:  Common labs        5/19/2022    10:01 11/30/2022    00:00   Common Labs   Glucose 96   95     BUN 10   10     Creatinine 0.76   0.73     Sodium 142   142     Potassium 4.6   4.8     Chloride 107   106     Calcium 9.5   9.8     Total Protein  7.1     Albumin 4.40   4.5     Total Bilirubin 0.4   0.4     Alkaline Phosphatase 67   67     AST (SGOT) 13   15     ALT (SGPT) 14   13     WBC 5.50   5.3     Hemoglobin 13.7   14.1     Hematocrit 42.4   40.5     Platelets 254   264     Total Cholesterol 154    "   Total Cholesterol  151     Triglycerides 69   65     HDL Cholesterol 54   51     LDL Cholesterol  86   87          BONE MINERAL DENSITOMETRY     HISTORY: Postmenopausal.     COMPARISON: BMD 11/30/2017     TECHNIQUE: The bone mineral density was determined using a dual-energy  x-ray source. Fracture risk is related to the absolute bone density and  is expressed as compared to a young gender-matched population  representative of the main peak bone density. Fracture risk is not  calculated for patients with osteoporosis or patients receiving  treatment for osteoporosis.      For postmenopausal women, men over age 65, and for men age 50-65 with  other risk factors, the WHO defines osteopenia is greater than -2.5 and  less than -1.0 standard deviations below peak BMD [T score] and  osteoporosis as 2.5 standard deviations or more below peak BMD. The risk  of osteoporotic fracture doubles for each standard deviation below peak  BMD. For premenopausal women, men under age 50, and for children, Z  scores are used to compare bone density to age-matched controls. The  diagnosis of osteoporosis in these populations requires clinical  assessment of additional risk factors.     Note that the lumbar bone density may be artificially elevated due to  vertebral compression fracture, degenerative disc disease/osteophyte  formation and sclerosis, aortic calcification. Femoral BMD could be  falsely elevated in the setting of degenerative change/arthritis.     FINDINGS:   LUMBAR SPINE:  The BMD measured in the L1, L2, L3 is 1.072 g/cm2 for a  T-score of 0.5 and a Z-score of 2.6     LEFT HIP: The BMD for the femoral neck is 0.656g/cm2 for a T score of   -1.7 and a Z score of 0.1     RIGHT HIP:  The BMD for the femoral neck is 0.607g/cm2 for a T score of  -2.2 and a Z score of -0.3     IMPRESSION:  1. Osteopenia.  2. When compared to prior exam 11/30/2017, the bone mineral density of  the lumbar spine has decreased 3% and the bone  mineral density of the  femoral necks has decreased 6% and 8% and these represent statistically  significant changes.  3. The 10-year FRAX (World Health Organization) fracture risk for a  major osteoporotic fracture is 19% and for a hip fracture is 6.5%.        Current recommendations are that a follow-up bone density be obtained at  an interval of not less than 2 years except in specific circumstances,  such as after initiation or change of therapy.     This report was finalized on 10/6/2022 8:43 AM by Dr. Troy Ron M.D.               ASSESSMENT & PLAN     Diagnoses and all orders for this visit:    1. Osteopenia of both hips (Primary)  -she is taking vitamin D and calcium OTC  -reviewed recent CMP and vitamin D results, normal calcium and vitamin D at goal   -reviewed 10/2022 DEXA as above; informed pt she is at increased risk of hip fractures  -she is very skeptical of bisphosphonates due to side effects; advised pt that in general these side effects are very rare (1 in millions) and risk is most likely much less than the benefit she would obtain   -at this time she is not interested in starting treatment; I encouraged her to keep up with weight bearing exercise    2. Allergy, initial encounter  -good control with over the counter medications, continue          The following social determinates of health impact the patient's medical decision making: No social determinates of health were factored in to today's visit.     Follow Up  Return in about 3 months (around 7/19/2023) for Medicare Wellness.    Patient/family had no further questions at this time and verbalized understanding of the plan discussed today.

## 2024-01-18 ENCOUNTER — TRANSCRIBE ORDERS (OUTPATIENT)
Dept: ADMINISTRATIVE | Facility: HOSPITAL | Age: 74
End: 2024-01-18
Payer: MEDICARE

## 2024-01-18 DIAGNOSIS — Z12.31 VISIT FOR SCREENING MAMMOGRAM: Primary | ICD-10-CM

## 2024-01-22 ENCOUNTER — OFFICE VISIT (OUTPATIENT)
Dept: INTERNAL MEDICINE | Facility: CLINIC | Age: 74
End: 2024-01-22
Payer: MEDICARE

## 2024-01-22 VITALS
HEIGHT: 67 IN | HEART RATE: 61 BPM | DIASTOLIC BLOOD PRESSURE: 74 MMHG | SYSTOLIC BLOOD PRESSURE: 120 MMHG | TEMPERATURE: 97.4 F | OXYGEN SATURATION: 100 % | WEIGHT: 194 LBS | BODY MASS INDEX: 30.45 KG/M2

## 2024-01-22 DIAGNOSIS — M85.852 OSTEOPENIA OF BOTH HIPS: Primary | ICD-10-CM

## 2024-01-22 DIAGNOSIS — M51.36 DDD (DEGENERATIVE DISC DISEASE), LUMBAR: ICD-10-CM

## 2024-01-22 DIAGNOSIS — M85.851 OSTEOPENIA OF BOTH HIPS: Primary | ICD-10-CM

## 2024-01-22 DIAGNOSIS — M25.552 LEFT HIP PAIN: ICD-10-CM

## 2024-01-22 PROCEDURE — G2211 COMPLEX E/M VISIT ADD ON: HCPCS | Performed by: STUDENT IN AN ORGANIZED HEALTH CARE EDUCATION/TRAINING PROGRAM

## 2024-01-22 PROCEDURE — 99214 OFFICE O/P EST MOD 30 MIN: CPT | Performed by: STUDENT IN AN ORGANIZED HEALTH CARE EDUCATION/TRAINING PROGRAM

## 2024-01-22 NOTE — PROGRESS NOTES
"  Christian Kovacs D.O.  Internal Medicine  [facility]  [address], [address]  [place] [address]  [phone]      Chief Complaint  6 mos check-up    SUBJECTIVE    History of Present Illness    Adelia Morris is a 73 y.o. female who presents to the office today as an established patient that last saw me on 7/20/2023.     Osteopenia: states she ran out of vitamin D and calcium, in the past she has declined medication treatment. States she walks on treadmill and walks stairs at her house 3-4 times weekly 15-30 minutes \"depending on my mood\".     Allergies   Allergen Reactions    Bactrim [Sulfamethoxazole-Trimethoprim] Rash    Clindamycin/Lincomycin Rash        No outpatient medications have been marked as taking for the 1/22/24 encounter (Office Visit) with Christian Kovacs DO.        Past Medical History:   Diagnosis Date    Allergies     Colon polyps     Hemorrhoids     Low back pain with right-sided sciatica     Menopause     Osteopenia     UTI (urinary tract infection)        OBJECTIVE    Vital Signs:   /74   Pulse 61   Temp 97.4 °F (36.3 °C) (Infrared)   Ht 170.2 cm (67\")   Wt 88 kg (194 lb)   SpO2 100%   BMI 30.38 kg/m²     Physical Exam  Vitals reviewed.   Constitutional:       General: She is not in acute distress.     Appearance: She is obese. She is not ill-appearing.   Eyes:      General: No scleral icterus.  Pulmonary:      Effort: Pulmonary effort is normal. No respiratory distress.   Musculoskeletal:      Comments: TTP over the left sacroiliac joint. There is some slight ttp over the left trochanteric bursa. No hip pain when the patient is moved through all hip ROM.    Skin:     Coloration: Skin is not jaundiced.   Neurological:      Mental Status: She is alert.   Psychiatric:         Mood and Affect: Mood normal.         Behavior: Behavior normal.         Thought Content: Thought content normal.                             ASSESSMENT & PLAN     Diagnoses and all " "orders for this visit:    1. Osteopenia of both hips (Primary)  - states she ran out of vitamin D and calcium, in the past she has declined medication treatment. States she walks on treadmill and walks stairs at her house 3-4 times weekly 15-30 minutes \"depending on my mood\".   -recommend she restart vit D and Calcium  -discussed her 2022 DEXA again. Discussed she is at high risk of hip fractures. Discussed the morbidity that is associated with hip fractures and that we have medications to slow down this process. She is concerned about dental side effects. Discussed that these are rare when used for short duration such as we would plan with her. Discussed the benefit of reduced fracture risk. At this point she continues to want to think about these.   -     Comprehensive metabolic panel  -     Vitamin D 25 hydroxy    2. Left hip pain  3. DDD (degenerative disc disease), lumbar  -She has chronic lumbar degenerative disease as well as pars defect of lumbar spine and other changes as discussed in past notes  -since last visit she has gone to physical therapy and reported some improvement with treatment  -she still deals with occasional mild left hip pain when going from seated to standing. She states the pain is deep in the groin. She states she occasionally uses a NSAID over the counter for relief. On exam she has some tenderness to  palpation over the left SI joint, suspicious for sacroilitis as a cause of her persistent pain. I offered pain management referral for consideration of steroid injection in this area but she declines at this time due to how mild the pain is. She might has a small amount of trochanteric bursitis on the left as well.   -will obtain xray of the hip to evaluate for arthritis changes in the hip           The following social determinates of health impact the patient's medical decision making: No social determinates of health were factored in to today's visit.     Follow Up  Return in about 6 " months (around 7/22/2024) for Medicare Wellness.    Patient/family had no further questions at this time and verbalized understanding of the plan discussed today.

## 2024-01-23 LAB
25(OH)D3+25(OH)D2 SERPL-MCNC: 29.1 NG/ML (ref 30–100)
ALBUMIN SERPL-MCNC: 4.2 G/DL (ref 3.5–5.2)
ALBUMIN/GLOB SERPL: 2 G/DL
ALP SERPL-CCNC: 65 U/L (ref 39–117)
ALT SERPL-CCNC: 11 U/L (ref 1–33)
AST SERPL-CCNC: 10 U/L (ref 1–32)
BILIRUB SERPL-MCNC: 0.4 MG/DL (ref 0–1.2)
BUN SERPL-MCNC: 11 MG/DL (ref 8–23)
BUN/CREAT SERPL: 13.3 (ref 7–25)
CALCIUM SERPL-MCNC: 9.3 MG/DL (ref 8.6–10.5)
CHLORIDE SERPL-SCNC: 107 MMOL/L (ref 98–107)
CO2 SERPL-SCNC: 25.4 MMOL/L (ref 22–29)
CREAT SERPL-MCNC: 0.83 MG/DL (ref 0.57–1)
EGFRCR SERPLBLD CKD-EPI 2021: 75 ML/MIN/1.73
GLOBULIN SER CALC-MCNC: 2.1 GM/DL
GLUCOSE SERPL-MCNC: 94 MG/DL (ref 65–99)
POTASSIUM SERPL-SCNC: 4.1 MMOL/L (ref 3.5–5.2)
PROT SERPL-MCNC: 6.3 G/DL (ref 6–8.5)
SODIUM SERPL-SCNC: 141 MMOL/L (ref 136–145)

## 2024-02-14 ENCOUNTER — HOSPITAL ENCOUNTER (OUTPATIENT)
Facility: HOSPITAL | Age: 74
Discharge: HOME | End: 2024-02-14
Admitting: STUDENT IN AN ORGANIZED HEALTH CARE EDUCATION/TRAINING PROGRAM
Payer: MEDICARE

## 2024-02-14 PROCEDURE — 73502 X-RAY EXAM HIP UNI 2-3 VIEWS: CPT

## 2024-03-04 ENCOUNTER — HOSPITAL ENCOUNTER (OUTPATIENT)
Dept: MAMMOGRAPHY | Facility: HOSPITAL | Age: 74
Discharge: HOME | End: 2024-03-04
Admitting: STUDENT IN AN ORGANIZED HEALTH CARE EDUCATION/TRAINING PROGRAM
Payer: MEDICARE

## 2024-03-04 DIAGNOSIS — Z12.31 VISIT FOR SCREENING MAMMOGRAM: ICD-10-CM

## 2024-03-04 PROCEDURE — 77067 SCR MAMMO BI INCL CAD: CPT

## 2024-03-04 PROCEDURE — 77063 BREAST TOMOSYNTHESIS BI: CPT

## 2024-05-20 ENCOUNTER — TELEPHONE (OUTPATIENT)
Dept: INTERNAL MEDICINE | Facility: CLINIC | Age: 74
End: 2024-05-20
Payer: MEDICARE

## 2024-05-20 DIAGNOSIS — M51.36 DDD (DEGENERATIVE DISC DISEASE), LUMBAR: Primary | ICD-10-CM

## 2024-05-20 DIAGNOSIS — M25.552 LEFT HIP PAIN: ICD-10-CM

## 2024-05-20 NOTE — TELEPHONE ENCOUNTER
"    Caller: Adelia Morris \"AKIKO\"    Relationship: Self    Best call back number: [phone]     What is the medical concern/diagnosis: BURSITIS IN LEFT HIP    What specialty or service is being requested: ORTHOPEDICS      Any additional details: PATIENT WOULD LIKE ORTHOPEDIST WHO WILL INCLUDE DEEP PHYSICAL THERAPY IN PLAN.    "

## 2024-05-28 NOTE — PROGRESS NOTES
Patient Name: Adelia Morris   YOB: 1950  Referring Primary Care Physician: Christian Kovacs DO      Chief Complaint:    Chief Complaint   Patient presents with    Lumbar Spine - Pain, Initial Evaluation        Previous Treatment:   Former Dr. Rubio patient   MRI:   06/05/2017 - MRI of L-Spine W/O ()   PT for back pain? Yes  Effective? Yes   LESI:   06/29/2017 - () - Not effective         Back Pain  This is a chronic problem. The current episode started more than 1 year ago. The problem occurs intermittently. The problem has been worse since onset. The pain is present in the lumbar spine. The quality of the pain is described as stabbing (Deep). Radiates to: JANIE hip, Referring down the lateral aspect of the LLE into lt ankle. The pain is at a severity of 6/10. The pain is moderate. The symptoms are aggravated by standing (walking, stairs). Stiffness is present All day. Associated symptoms include bladder incontinence, leg pain, tingling and weakness. Pertinent negatives include no bowel incontinence or numbness. (LLE) She has tried ice, bed rest and NSAIDs for the symptoms. The treatment provided mild relief.        HPI:  Adelia Morris is a 73 y.o. female who presents to [facility] for evaluation of above complaints.  She actually complains more of left buttock and hip pain worse when taking stairs, however does have occasional radiating pain down the left lower extremity to the ankle and dorsum of the foot.  Has a longstanding history of back issues.  She says she was diagnosed in the early 1980s with grade 3 spondylolisthesis.  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  She was also evaluated by Dr. Rubio in 2017 and had lumbar MRI.  He recommended epidural injections.  She does recall having 1 epidural and states that she did not really gain much relief.  Overall, her back has not really  slowed her down much over the years.  She completed a round of physical therapy last year focused on her left hip primarily.  She remains active for her age.  This is a previously established patient to the office, new to me.  Prior pertinent records were reviewed.    PFSH:  See attached    ROS: As per HPI, otherwise negative    Objective:      73 y.o. female  Body mass index is 31.47 kg/m²., 88.5 kg (195 lb), @HT@  Vitals:    24 0843   Temp: 97.3 °F (36.3 °C)     Pain Score    24 0843   PainSc:   6   PainLoc: Back            Spine Musculoskeletal Exam    Gait    Gait is normal.    Inspection    Coronal balance: no imbalance    Sagittal balance: no imbalance    Palpation    Thoracolumbar    Tenderness: present      SI Joint: left      Greater trochanter: left    Right      Muscle tone: normal    Left      Muscle tone: normal    Strength    Thoracolumbar    Thoracolumbar motor exam is normal.       Right      Extensor hallucis longus: 5/5.       Tibialis anterior: 5/5.       Tibialis posterior: 5/5.       Plantar flexion: 5/5.       Quadriceps: 5/5.       Hamstrin/5.       Hip abductors: 5/5.       Hip flexion: 5/5.       Hip adduction: 5/5.        Left      Extensor hallucis longus: 5/5.       Tibialis anterior: 5/5.       Tibialis posterior: 5/5.       Plantar flexion: 5/5.       Quadriceps: 5/5.       Hamstrin/5.       Hip abductors: 5/5.       Hip flexion: 4/5. Hip flexion is affected by pain.       Hip adduction: 5/5.      Sensory    Thoracolumbar    Thoracolumbar sensation is normal.    Reflexes    Right      Quadriceps: 1/4      Achilles: 0/4     Left      Quadriceps: 0/4      Achilles: 0/4    Special Tests    Thoracolumbar      Right      CARLO test: negative      SLR: no back or leg pain      Left      CARLO test: negative      SLR: pain radiates to left leg      SLR pain at: 90 degrees        IMAGING:     Indication: pain related symptoms,  Views: 2V AP&LAT lumbar  Findings: Reviewed and  reveals grade 3 anterolisthesis L5 on S1 mild scoliosis apex around L3, multilevel degenerative disc and facet changes.  May have slight progression of anterolisthesis when compared to imaging 2017, difficult to tell on current imaging.        Assessment:           Diagnoses and all orders for this visit:    1. Spondylolisthesis of lumbar region (Primary)  -     MRI Lumbar Spine With & Without Contrast; Future  -     Ambulatory Referral to Physical Therapy  -     XR Spine Lumbar Flex & Ext    2. Trochanteric bursitis of left hip  -     Ambulatory Referral to Physical Therapy  -     Ambulatory Referral to Orthopedic Surgery    3. Lumbar radiculopathy  -     MRI Lumbar Spine With & Without Contrast; Future  -     Ambulatory Referral to Physical Therapy  -     XR Spine Lumbar Flex & Ext    4. Pain  -     XR Spine Lumbar 2 or 3 View             Plan:  For her pattern of back and hip pain with radiculopathy, she would like to try physical therapy again.  She does not have any loss of strength or sensation on exam, but with the question of progressive spondylolisthesis, lumbar MRI without contrast is justified.  If there has been progression, we will likely have her follow-up with one of the neurosurgeons for a final definitive surgical opinion, however she would like to avoid surgery at all cost and since she has done well for decades with the current spondylolisthesis, hopefully that can be avoided long-term.  We have discussed red flags.  For the left hip pain, she actually thought she was being evaluated today for bursitis based on her symptoms and recent hip imaging with her PCP.  She would like to try a left hip injection so we will refer her to [facility] to try left GTB injection as a diagnostic tool and treatment.  Will call her with the results of the lumbar MRI and direct treatment appropriately.  She may have an element of SI joint mediated pain as well as she is tender to palpation of the left SI joint  so may consider SI joint injection after MRI, but certainly has significant lumbar pathology that could explain most of her symptoms.    Return for Call with results.    EMR Dragon/Transcription Disclaimer:   Much of this encounter note is an electronic transcription/translation of spoken language to printed text. The electronic translation of spoken language may permit erroneous, or at times, nonsensical words or phrases to be inadvertently transcribed; Although I have reviewed the note for such errors, some may still exist.  Red flags have been discussed at this or previous visits to include but not limited weakness in extremities, worsening pain that does not respond to conservative treatment and bowel or bladder dysfunction. These are reasons to present to ER and patient has been informed.    The diagnosis(es), natural history, pathophysiology and treatment for diagnosis(es) were discussed. Opportunity given and questions answered. Biomechanics of pertinent body areas discussed.    EXERCISES:  Advice on benefits of, and types of regular/moderate exercise pertaining to diagnosis.  Continue HEP. For back or neck pain, recommend pilates and or yoga as tolerated. Generally it is best to start any new exercise under the guidance of a  or therapist.   MEDICATIONS:  When prescribe, the risks, benefits, warnings,side effects and alternatives of medications discussed. Advised against long term use of narcotics.   PAIN CONTROL:  Cold, heat, OTC lidocaine patches and/or ointment as needed. Avoid direct skin contact with ice. Ice 15-20 minutes 3-4 times daily as needed. For SI joint pain, recommend ice bath in water about 50 degrees for 5 consecutive days, add ice slowly to help with adjustment and may cover with warm towel or robe to help with cold tolerance. If using lidocaine, do not apply heat in conjunction as this can cause a burn.   MEDICAL RECORDS reviewed from other provider(s) for past and current medical  history pertinent to this visit..

## 2024-05-29 ENCOUNTER — OFFICE VISIT (OUTPATIENT)
Dept: ORTHOPEDIC SURGERY | Facility: CLINIC | Age: 74
End: 2024-05-29
Payer: MEDICARE

## 2024-05-29 VITALS — TEMPERATURE: 97.3 F | HEIGHT: 66 IN | BODY MASS INDEX: 31.34 KG/M2 | WEIGHT: 195 LBS

## 2024-05-29 DIAGNOSIS — M43.16 SPONDYLOLISTHESIS OF LUMBAR REGION: Primary | ICD-10-CM

## 2024-05-29 DIAGNOSIS — R52 PAIN: ICD-10-CM

## 2024-05-29 DIAGNOSIS — M54.16 LUMBAR RADICULOPATHY: ICD-10-CM

## 2024-05-29 DIAGNOSIS — M70.62 TROCHANTERIC BURSITIS OF LEFT HIP: ICD-10-CM

## 2024-06-04 ENCOUNTER — OFFICE VISIT (OUTPATIENT)
Dept: ORTHOPEDIC SURGERY | Facility: CLINIC | Age: 74
End: 2024-06-04
Payer: MEDICARE

## 2024-06-04 VITALS — TEMPERATURE: 98.6 F | BODY MASS INDEX: 31.34 KG/M2 | HEIGHT: 66 IN | WEIGHT: 195 LBS

## 2024-06-04 DIAGNOSIS — M70.62 GREATER TROCHANTERIC BURSITIS OF LEFT HIP: Primary | ICD-10-CM

## 2024-06-04 PROCEDURE — 1159F MED LIST DOCD IN RCRD: CPT | Performed by: NURSE PRACTITIONER

## 2024-06-04 PROCEDURE — 99213 OFFICE O/P EST LOW 20 MIN: CPT | Performed by: NURSE PRACTITIONER

## 2024-06-04 PROCEDURE — 1160F RVW MEDS BY RX/DR IN RCRD: CPT | Performed by: NURSE PRACTITIONER

## 2024-06-04 PROCEDURE — 20610 DRAIN/INJ JOINT/BURSA W/O US: CPT | Performed by: NURSE PRACTITIONER

## 2024-06-04 RX ORDER — METHYLPREDNISOLONE ACETATE 80 MG/ML
80 INJECTION, SUSPENSION INTRA-ARTICULAR; INTRALESIONAL; INTRAMUSCULAR; SOFT TISSUE
Status: COMPLETED | OUTPATIENT
Start: 2024-06-04 | End: 2024-06-04

## 2024-06-04 RX ADMIN — METHYLPREDNISOLONE ACETATE 80 MG: 80 INJECTION, SUSPENSION INTRA-ARTICULAR; INTRALESIONAL; INTRAMUSCULAR; SOFT TISSUE at 09:19

## 2024-06-04 NOTE — PROGRESS NOTES
Answers submitted by the patient for this visit:  Other (Submitted on 5/29/2024)  Please describe your symptoms.: Pain mostly is in my left hip, sometimes going down outer part of leg and around my ankle , occasionally gives way while walking. I can't walk much distance as it causes my leg to tighten up and also have trouble pushing off on stairs w/left leg, trouble standing too long causing my back to feel  weak and strained, even up into my neck area.   An X-ray done at [facility] showed bursitis in my left hip.  Have you had these symptoms before?: Yes  How long have you been having these symptoms?: Greater than 2 weeks  Please list any medications you are currently taking for this condition.: Tylenol, Advil, Aleve  Please describe any probable cause for these symptoms. : I have had back issues off and on throughout my life.  Nothing specific that caused the issue at this time other than I was told that I have bursitis per X-ray at [facility], February 14,2024.  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  Primary Reason for Visit (Submitted on 5/29/2024)  What is the primary reason for your visit?: Other  Patient Name: Adelia Morris   YOB: 1950  Referring Primary Care Physician: Christian Kovacs DO  BMI: Body mass index is 31.47 kg/m².    Chief Complaint:    Chief Complaint   Patient presents with    Left Hip - Pain        HPI: New pt to me that presented with left hip bursitis and has a hx of low back problems and follows with CHRISTIANNE Gutierres. Pt has point tenderness to her hip and diffuse pain to lower lumbar spine.     In PT for lumbar DJD and does not ambulate with any assistive devices   .  Adelia Morris is a 73 y.o. female who presents today for evaluation of   Chief Complaint   Patient presents with    Left Hip - Pain         Subjective    Medications:   Home Medications:  Current Outpatient Medications on File Prior to Visit   Medication Sig    Calcium Carbonate-Vitamin D (CALTRATE 600+D PO) Take  by mouth.    cetirizine (zyrTEC) 10 MG tablet Take 1 tablet by mouth Daily.    Misc Natural Products (OSTEO BI-FLEX JOINT SHIELD PO) Take 1 tablet by mouth 2 (Two) Times a Day.    fluticasone (FLONASE) 50 MCG/ACT nasal spray 2 sprays by Each Nare route Daily. (Patient not taking: Reported on 2023)    Probiotic Product (PROBIOTIC PEARLS WOMENS PO) Take  by mouth. (Patient not taking: Reported on 2024)     No current facility-administered medications on file prior to visit.     Current Medications:  Scheduled Meds:  Continuous Infusions:No current facility-administered medications for this visit.    PRN Meds:.    I have reviewed the patient's medical history in detail and updated the computerized patient record.  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    Low back pain with right-sided sciatica     Low back strain     Menopause     Neck strain     Osteopenia     UTI (urinary tract infection)         Past Surgical History:   Procedure Laterality Date    BUNIONECTOMY      right     SECTION  1993    COLONOSCOPY N/A 2016    Procedure: COLONOSCOPY ith polypectomy (cold bx);  Surgeon: Jarrod John Jr., MD;  Location: [facility] ENDOSCOPY;  Service:     COLONOSCOPY      WNL PER PT      COLONOSCOPY N/A 2021    Procedure: COLONOSCOPY TO CECUM AND TI;  Surgeon: Jarrod John Jr., MD;  Location: [facility] ENDOSCOPY;  Service: General;  Laterality: N/A;  pre: history of colon polyps  post: diverticulosis    EYE SURGERY      cataracts    LAPAROSCOPIC TUBAL LIGATION          Social History     Occupational History     Employer: RETIRED     Comment:   "teacher   Tobacco Use    Smoking status: Never    Smokeless tobacco: Never   Vaping Use    Vaping status: Never Used   Substance and Sexual Activity    Alcohol use: Never    Drug use: Never    Sexual activity: Yes     Partners: Male     Birth control/protection: Tubal ligation      Social History     Social History Narrative    Not on file        Family History   Problem Relation Age of Onset    Osteoporosis Mother     Cancer Father         BONE MARROW    Hypertension Sister     Thyroid disease Sister     Breast cancer Neg Hx        ROS: 14 point review of systems was performed and all other systems were reviewed and are negative except for documented findings in HPI and today's encounter.     Allergies:   Allergies   Allergen Reactions    Bactrim [Sulfamethoxazole-Trimethoprim] Rash    Clindamycin/Lincomycin Rash     Constitutional:  Denies fever, shaking or chills   Eyes:  Denies change in visual acuity   HENT:  Denies nasal congestion or sore throat   Respiratory:  Denies cough or shortness of breath   Cardiovascular:  Denies chest pain or severe LE edema   GI:  Denies abdominal pain, nausea, vomiting, bloody stools or diarrhea   Musculoskeletal:  Numbness, tingling, pain, or loss of motor function only as noted above in history of present illness.  : Denies painful urination or hematuria  Integument:  Denies rash, lesion or ulceration   Neurologic:  Denies headache or focal weakness  Endocrine:  Denies lymphadenopathy  Psych:  Denies confusion or change in mental status   Hem:  Denies active bleeding    OBJECTIVE:  Physical Exam: 73 y.o. female  Wt Readings from Last 3 Encounters:   06/04/24 88.5 kg (195 lb)   05/29/24 88.5 kg (195 lb)   01/22/24 88 kg (194 lb)     Ht Readings from Last 1 Encounters:   06/04/24 167.6 cm (66\")     Body mass index is 31.47 kg/m².  Vitals:    06/04/24 0847   Temp: 98.6 °F (37 °C)     Vital signs reviewed.     General Appearance:    Alert, cooperative, in no acute distress         "          Eyes: conjunctiva clear  ENT: external ears and nose atraumatic  CV: no peripheral edema  Resp: normal respiratory effort  Skin: no rashes or wounds; normal turgor  Psych: mood and affect appropriate  Lymph: no nodes appreciated  Neuro: gross sensation intact  Vascular:  Palpable peripheral pulse in noted extremity  Musculoskeletal Extremities: skin warm, dry and intact with NVI and calves soft and nttp. TTP greater trochanter bursitis, no pain with internal rotation of hip  Radiology:     Narrative & Impression  XR HIP W OR WO PELVIS 2-3 VIEW RIGHT-     Clinical: Pain, fell     FINDINGS: The left hip joint is normal. There is bone hypertrophy  demonstrated along the greater trochanter, question underlying  trochanteric bursitis. No bone lesion or acute osseous abnormality nor  avascular necrosis seen.     Incidentally noted calcification of the iliolumbar ligaments along the  superior margin of the right and left sacrum. The remainder is  unremarkable.     This report was finalized on 2/15/2024 12:56 PM by Dr. Sebastian Galo M.D on Workstation: [id number]     Assessment:     ICD-10-CM ICD-9-CM   1. Greater trochanteric bursitis of left hip  M70.62 726.5        Large Joint Arthrocentesis: L greater trochanteric bursa  Date/Time: 6/4/2024 9:19 AM  Consent given by: patient  Site marked: site marked  Timeout: Immediately prior to procedure a time out was called to verify the correct patient, procedure, equipment, support staff and site/side marked as required   Supporting Documentation  Indications: pain   Procedure Details  Location: hip - L greater trochanteric bursa  Preparation: Patient was prepped and draped in the usual sterile fashion  Needle gauge: 21 G.  Approach: lateral  Medications administered: 2 mL lidocaine (cardiac); 80 mg methylPREDNISolone acetate 80 MG/ML  Patient tolerance: patient tolerated the procedure well with no immediate complications        MDM/Plan:   The diagnosis(es), natural  history, pathophysiology and treatment for diagnosis(es) were discussed. Opportunity given and questions answered.  Biomechanics of pertinent body areas discussed.  When appropriate, the use of ambulatory aids discussed.  EXERCISES:  Advice on benefits of, and types of regular/moderate exercise pertaining to orthopedic diagnosis(es).  MEDICATIONS:  The risks, benefits, warnings,side effects and alternatives of medications discussed.  Inflammation/pain control; with cold, heat, elevation and/or liniments discussed as appropriate  Cortisone Injection. See procedure note.  PT referral.  HOME EXERCISE/PT program encouraged  MEDICAL RECORDS reviewed from other provider(s) for past and current medical history pertinent to this complaint.      6/4/2024    Much of this encounter note is an electronic transcription/translation of spoken language to printed text. The electronic translation of spoken language may permit erroneous, or at times, nonsensical words or phrases to be inadvertently transcribed; Although I have reviewed the note for such errors, some may still exist

## 2024-06-10 ENCOUNTER — OFFICE VISIT (OUTPATIENT)
Dept: INTERNAL MEDICINE | Facility: CLINIC | Age: 74
End: 2024-06-10
Payer: MEDICARE

## 2024-06-10 VITALS
BODY MASS INDEX: 30.7 KG/M2 | HEIGHT: 66 IN | HEART RATE: 84 BPM | WEIGHT: 191 LBS | OXYGEN SATURATION: 97 % | DIASTOLIC BLOOD PRESSURE: 98 MMHG | SYSTOLIC BLOOD PRESSURE: 134 MMHG

## 2024-06-10 DIAGNOSIS — H81.11 BENIGN PAROXYSMAL POSITIONAL VERTIGO OF RIGHT EAR: Primary | ICD-10-CM

## 2024-06-10 PROCEDURE — 1125F AMNT PAIN NOTED PAIN PRSNT: CPT | Performed by: STUDENT IN AN ORGANIZED HEALTH CARE EDUCATION/TRAINING PROGRAM

## 2024-06-10 PROCEDURE — 99213 OFFICE O/P EST LOW 20 MIN: CPT | Performed by: STUDENT IN AN ORGANIZED HEALTH CARE EDUCATION/TRAINING PROGRAM

## 2024-06-10 NOTE — PROGRESS NOTES
"  Christian Kovacs D.O.  Internal Medicine  [facility]  [address], [address]  [place] [address]  [phone]      Chief Complaint  Dizziness (Started last week. Been having sinus issue)    SUBJECTIVE    History of Present Illness    Adelia Morris is a 73 y.o. female who presents to the office today as an established patient that last saw me on 1/22/2024.     Here today for acute care visit.    States last week she felt a little dizzy when she turned her head. She felt that she was going to \"pitch forward\". No nausea associated. Later that day appreciated a lot of pressure in her face. She started using flonase nasal spray and could feel the pressure shifting around her head. She took some advil sinus as well. Today states she is feeling better. She describes the sensation as a spinning, worsened by bending her head backwards to wash her hair. \"It wasn't severe\". Pt states she is not blowing anything out of her nose. States her ears feel full and she has clear discharging draining down the back of her throat. No fever or chills. \"I feel fine, aside from that little spin\".       Allergies   Allergen Reactions    Bactrim [Sulfamethoxazole-Trimethoprim] Rash    Clindamycin/Lincomycin Rash        Outpatient Medications Marked as Taking for the 6/10/24 encounter (Office Visit) with Christian Kovacs, DO   Medication Sig Dispense Refill    Calcium Carbonate-Vitamin D (CALTRATE 600+D PO) Take  by mouth.      cetirizine (zyrTEC) 10 MG tablet Take 1 tablet by mouth Daily.      Misc Natural Products (OSTEO BI-FLEX JOINT SHIELD PO) Take 1 tablet by mouth 2 (Two) Times a Day.          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   Constitutional:       General: She is not in acute distress.     Appearance: Normal appearance. She is obese. She is not ill-appearing.   HENT:      Right Ear: Tympanic membrane, ear canal and external ear normal. There is no impacted cerumen.      Left Ear: Tympanic membrane, ear canal and external ear normal. There is no impacted cerumen.      Ears:      Comments: + omar hallpike Right side with symptom reproduction but no nystagmus.      Mouth/Throat:      Mouth: Mucous membranes are moist.      Pharynx: Oropharynx is clear. No oropharyngeal exudate or posterior oropharyngeal erythema.   Eyes:      General: No scleral icterus.  Lymphadenopathy:      Cervical: No cervical adenopathy.   Skin:     Coloration: Skin is not jaundiced.   Neurological:      Mental Status: She is alert.   Psychiatric:         Mood and Affect: Mood normal.         Behavior: Behavior normal.         Thought Content: Thought content normal.                             ASSESSMENT & PLAN     Diagnoses and all orders for this visit:    1. Benign paroxysmal positional vertigo of right ear (Primary)  -pt presents to office with signs and symptoms of BPPV , seemingly self improving already   -we discussed the etiology of this condition and the usual self limiting nature  -I will provide her a PT referral for Epley maneuver   -return to office if progressive or new symptoms develop   -     Ambulatory Referral to Physical Therapy            Follow Up  No follow-ups on file.    Patient/family had no further questions at this time and verbalized understanding of the plan discussed today.   "

## 2024-06-12 ENCOUNTER — TREATMENT (OUTPATIENT)
Dept: PHYSICAL THERAPY | Facility: CLINIC | Age: 74
End: 2024-06-12
Payer: MEDICARE

## 2024-06-12 DIAGNOSIS — R29.898 DECREASED STRENGTH OF LOWER EXTREMITY: ICD-10-CM

## 2024-06-12 DIAGNOSIS — M25.651 DECREASED RANGE OF MOTION OF BOTH HIPS: Primary | ICD-10-CM

## 2024-06-12 DIAGNOSIS — M53.86 DECREASED RANGE OF MOTION OF LUMBAR SPINE: ICD-10-CM

## 2024-06-12 DIAGNOSIS — M25.652 DECREASED RANGE OF MOTION OF BOTH HIPS: Primary | ICD-10-CM

## 2024-06-12 NOTE — PROGRESS NOTES
Physical Therapy Initial Evaluation and Plan of Care  [facility] [address], [address] [place] [id number]     Patient: Adelia Morris   : 1950  Referring practitioner: Haydee Gutierres APRN  Date of Initial Visit: 2024  Today's Date: 2024  Patient seen for 1 sessions  PT Clinic location: [address], [address]  [id number]          Visit Diagnoses:    ICD-10-CM ICD-9-CM   1. Decreased range of motion of both hips  M25.651 719.55    M25.652    2. Decreased strength of lower extremity  R29.898 729.89   3. Decreased range of motion of lumbar spine  M53.86 724.9       Subjective Questionnaire: Back Index: 36%  LEFS: 42/80    Subjective Evaluation    History of Present Illness  Mechanism of injury: Since last April I have been having back pain. It started when I felt a pop in my back. I went to the doctor in July and had PT from July to October. The PT helped then. I got a corticosteroid injection in my L hip about a week ago, this seems to have helped the pain subside some. I have pain all across my lower back now, I think because I overcompensate and try to guard my L side.   I have a 3rd degree spondylolisthesis that they found in  and they think that it is a birth defect. I have had back pain since I was about [age] years old. I would get pain one day and then lay on a hard surface and the pain would get better.   I am having a hard time with going up and down stairs, standing and walking, lifting things up off the floor, and anything with twisting. I have a hard time with vacuuming and other house hold activities and yard work. I can't walk on uneven surfaces because of my balance.  I also have been having some dizziness and it seems like when my sinuses are tighter the dizziness is worse.  I am getting an MRI on .   I get pain in my leg when going from sitting to standing. I rarely get  numbness or tingling into my leg but occasionally down into my foot. I don't really have a lot of pain that keeps me up at night.       Patient Occupation: Retired Teacher Quality of life: good    Pain  Current pain ratin  At best pain ratin  At worst pain ratin (with stairs)  Quality: dull ache and sharp  Aggravating factors: ambulation, stairs, sleeping, squatting, prolonged positioning, lifting and standing    Social Support  Lives in: multiple-level home  Lives with: spouse    Treatments  Previous treatment: physical therapy  Patient Goals  Patient goals for therapy: improved balance, increased strength, decreased pain, independence with ADLs/IADLs and return to sport/leisure activities  Patient goal: I really want to be able to get back to doing house work and yard work and dancing without pain.       Medical history: Osteopenia of hip. See chart for further detail.   Therapy Precautions: N/A      Objective          Palpation   Left   Tenderness of the erector spinae, gluteus neville, gluteus medius, lateral gastrocnemius, lumbar interspinals, lumbar paraspinals, medial gastrocnemius, piriformis and vastus lateralis.     Right Tenderness of the erector spinae, gluteus neville, gluteus medius, lateral gastrocnemius, lumbar interspinals, lumbar paraspinals, medial gastrocnemius, piriformis and vastus lateralis.     Tenderness     Lumbar Spine  No tenderness in the spinous process.     Left Hip   Tenderness in the greater trochanter and iliac crest.     Right Hip   Tenderness in the greater trochanter and iliac crest.   Left Knee   Tenderness in the ITB.     Right Knee   Tenderness in the ITB.     Active Range of Motion   Left Hip   Flexion: 100 degrees   External rotation (90/90): 20 degrees   Internal rotation (90/90): 25 degrees     Right Hip   Flexion: 110 degrees   External rotation (90/90): 20 degrees with pain  Internal rotation (90/90): 35 degrees     Additional Active Range of Motion  Details  Lumbar flexion: about 30% of expected ROM with tightness across the back  Lumbar extension: about 25% of expected ROM with pain in the posterior hip    Strength/Myotome Testing     Left Hip   Planes of Motion   Flexion: 4 (pain)  Extension: 4-  Abduction: 4-  External rotation: 4-  Internal rotation: 4-    Right Hip   Planes of Motion   Flexion: 4  Extension: 4  Abduction: 4  External rotation: 4  Internal rotation: 4    Left Knee   Flexion: 4  Extension: 4+    Right Knee   Flexion: 4  Extension: 4+    Tests     Left Hip   Positive CARLO and FADIR.   Negative scour.   SLR: Negative.     Right Hip   Negative CARLO, FADIR and scour.   SLR: Negative.     Ambulation     Observational Gait   Gait: antalgic   Decreased walking speed and stride length.     Functional Assessment     Comments  L Tandem stance: 3 sec  R Tandem Stance: 8 sec          Assessment & Plan       Assessment  Impairments: abnormal gait, abnormal or restricted ROM, activity intolerance, impaired balance, impaired physical strength, lacks appropriate home exercise program and pain with function   Functional limitations: carrying objects, lifting, sleeping, walking, pulling, uncomfortable because of pain, moving in bed and standing   Assessment details: Pt is a 73 year old female who presents to PT with symptoms consistent with L greater trochanteric bursitis and non specific lumbar pain. Upon initial evaluation she presents with the following deficits and impairments: decreased lumbar range of motion with flexion and extension, decreased LE strength, decreased hip mobility bilaterally, and balance deficits. These deficits make it difficult for the patient to complete ADLs such as cleaning, cooking, and yard work. She also has a difficult time with transferring from sitting to standing, rolling over in bed, and ascending and descending stairs. Along with ambulation and balancing with walking. Pt would benefit from skilled PT intervention to  address the deficits noted and to improve overall quality of life.   Prognosis: good    Goals  Plan Goals:  SHORT TERM GOALS:  1.  Patient to be compliant with HEP and demo good efficiency with TE  2.  Report < 2 sleep disturbances 2° hip pain.     3.  Increased Lumbar and hip mobility to allow for improved pelvic alignment and gait mechanics (equal step length and level pelvis throughout gait).    4.  Increased hip ER/IR ROM to WFL (IR to 30°) degrees to allow for increased ease with bed mobility and squatting.  5. Pt will report minimal-no tenderness to palpation with firm pressure.   6. Pt. Able to ambulate up to 15 min with pain < 5/10 with acceptable pattern.      LONG TERM GOALS:  1.  Pt. to score > 80% perceived ability on LEFS  2.  Pain level < 3/10 in hips with all activities including sitting > 1 hr continuously.   3.  Hip  AROM to WNL to allow for return to ADL's/IADLS and functional activities without increased symptoms  4. Hip strength to 4+/5  to allow for pushing, pulling and more strenuous activities to occur without pain.  Walk > 30 min.  no pain  5. No palpable tenderness to the hip.         Plan  Therapy options: will be seen for skilled therapy services  Planned modality interventions: cryotherapy, electrical stimulation/Russian stimulation, iontophoresis, TENS, thermotherapy (hydrocollator packs), traction and ultrasound  Other planned modality interventions: Dry Needling  Planned therapy interventions: abdominal trunk stabilization, ADL retraining, body mechanics training, balance/weight-bearing training, flexibility, functional ROM exercises, gait training, home exercise program, joint mobilization, manual therapy, neuromuscular re-education, postural training, soft tissue mobilization, spinal/joint mobilization, strengthening, stretching and therapeutic activities  Frequency: 2x week  Duration in weeks: 12  Treatment plan discussed with: patient        See flowsheets for treatment  detail.  Education: Discussed underlying deficits, HEP, and POC.     History # of Personal Factors and/or Comorbidities: LOW (0)  Examination of Body System(s): # of elements: LOW (1-2)  Clinical Presentation: STABLE   Clinical Decision Making: LOW       Timed:         Manual Therapy:    -     mins  12582;     Therapeutic Exercise:    15     mins  26915;     Neuromuscular Anthony:    -    mins  23811;    Therapeutic Activity:     -     mins  95954;     Gait Training:           mins  99123;     Ultrasound:          mins  65927;    Ionto                                   mins   54248  Self Care                       8     mins   56249      Un-Timed:  Electrical Stimulation:         mins  95269 ( );  Dry Needling          mins self-pay  Traction          mins 79880  Low Eval     31     Mins  49952  Mod Eval     -     Mins  96013  High Eval                       -     Mins  57211  Re-Eval                               mins  99578      Timed Treatment:   23   mins   Total Treatment:     54   mins    PT SIGNATURE: Sivan Akbar PT   [place] PT license #: [id number]  DATE TREATMENT INITIATED: 6/12/2024    Initial Certification  Certification Period: 9/9/2024  I certify that the therapy services are furnished while this patient is under my care.  The services outlined above are required by this patient, and will be reviewed every 90 days.    PHYSICIAN: Haydee Gutierres APRN  NPI: [id number]                                      DATE:     Please sign and return via fax to [place] - Fax #: [phone]. Thank you, [facility].

## 2024-06-19 ENCOUNTER — TREATMENT (OUTPATIENT)
Dept: PHYSICAL THERAPY | Facility: CLINIC | Age: 74
End: 2024-06-19
Payer: MEDICARE

## 2024-06-19 DIAGNOSIS — M53.86 DECREASED RANGE OF MOTION OF LUMBAR SPINE: ICD-10-CM

## 2024-06-19 DIAGNOSIS — M25.652 DECREASED RANGE OF MOTION OF BOTH HIPS: Primary | ICD-10-CM

## 2024-06-19 DIAGNOSIS — M25.651 DECREASED RANGE OF MOTION OF BOTH HIPS: Primary | ICD-10-CM

## 2024-06-19 DIAGNOSIS — R29.898 DECREASED STRENGTH OF LOWER EXTREMITY: ICD-10-CM

## 2024-06-19 NOTE — PROGRESS NOTES
Physical Therapy Daily Treatment Note  [facility] Physical Therapy [place]  [address], [address]  [place] [address]     Patient: Adelia Morris  : 1950  Referring practitioner: Haydee Gutierres APRN  Today's Date: 2024    VISIT#: 2    Visit Diagnoses:    ICD-10-CM ICD-9-CM   1. Decreased range of motion of both hips  M25.651 719.55    M25.652    2. Decreased strength of lower extremity  R29.898 729.89   3. Decreased range of motion of lumbar spine  M53.86 724.9       Subjective   Adelia Morris reports: Patient reports that she has been doing exercises and they are going well.      Objective       See Exercise, Manual, and Modality Logs for complete treatment.     Patient Education: HEP review  Exercise rationale/ pain free exercise performance  Alternate exercise positions  Verbal/Tactile cues to ensure correct exercise performance/technique       Assessment/Plan  Patient demonstrates good tolerance to therapeutic exercises on this date with no lumbar or hip pain reported throughout. Continued with LE strengthening and lumbar mobility exercises. Will continue to progress as tolerated. May perform Shameka Hallpike next visit, she has been feeling better.       Progress per Plan of Care and Progress strengthening /stabilization /functional activity          Timed:         Manual Therapy:    -     mins  26875;     Therapeutic Exercise:    15     mins  15496;     Neuromuscular Anthony:    15    mins  33534;    Therapeutic Activity:     -     mins  42951;     Gait Training:           mins  88631;     Ultrasound:          mins  96490;    Ionto:                                   mins  81993  Self Care:                       -     mins  33273    Un-Timed:  Electrical Stimulation:         mins  20774 ( );  Dry Needling          mins self-pay  Traction          mins 44265  Re-Eval                               mins  63906  Group Therapy           ___ mins 58342    Timed Treatment:    30   mins   Total Treatment:     53   mins    Sivan Akbar PT  Physical Therapist  [place] license #: [id number]

## 2024-06-21 ENCOUNTER — TREATMENT (OUTPATIENT)
Dept: PHYSICAL THERAPY | Facility: CLINIC | Age: 74
End: 2024-06-21
Payer: MEDICARE

## 2024-06-21 DIAGNOSIS — R29.898 DECREASED STRENGTH OF LOWER EXTREMITY: ICD-10-CM

## 2024-06-21 DIAGNOSIS — M25.652 DECREASED RANGE OF MOTION OF BOTH HIPS: Primary | ICD-10-CM

## 2024-06-21 DIAGNOSIS — M53.86 DECREASED RANGE OF MOTION OF LUMBAR SPINE: ICD-10-CM

## 2024-06-21 DIAGNOSIS — M25.651 DECREASED RANGE OF MOTION OF BOTH HIPS: Primary | ICD-10-CM

## 2024-06-21 NOTE — PROGRESS NOTES
Physical Therapy Daily Treatment Note  [facility] Physical Therapy [place]  [address], [address]  [place] [id number]     Patient: Adelia Morris  : 1950  Referring practitioner: Haydee Gutierres APRN  Today's Date: 2024    VISIT#: 3    Visit Diagnoses:    ICD-10-CM ICD-9-CM   1. Decreased range of motion of both hips  M25.651 719.55    M25.652    2. Decreased strength of lower extremity  R29.898 729.89   3. Decreased range of motion of lumbar spine  M53.86 724.9       Subjective   Adelia Morris reports: Patient reports that she feels like she is walking a little better, still sore but feels like she is getting used to the exercises. She never feels like the room is spinning and isn't sure if it is dizziness or just a balance and unsteadiness.      Objective       See Exercise, Manual, and Modality Logs for complete treatment.     Patient Education: HEP review  Exercise rationale/ pain free exercise performance  Alternate exercise positions  Verbal/Tactile cues to ensure correct exercise performance/technique       Assessment/Plan  Patient demonstrates good tolerance to therapeutic exercises and manual therapy on this date with no report of increased hip pain. Added tandem stance as an exercise and continued with LE strengthening. Verbal cues were provided for proper positioning of pelvis during clamshells. Continue to progress as tolerated.       Progress per Plan of Care and Progress strengthening /stabilization /functional activity          Timed:         Manual Therapy:    8     mins  30824;     Therapeutic Exercise:    12     mins  66732;     Neuromuscular Anthony:    15    mins  57148;    Therapeutic Activity:     -     mins  46606;     Gait Training:           mins  18093;     Ultrasound:          mins  31550;    Ionto:                                   mins  54909  Self Care:                       -     mins  94862    Un-Timed:  Electrical Stimulation:         mins   34281 ( );  Dry Needling          mins self-pay  Traction          mins 34420  Re-Eval                               mins  09666  Group Therapy           ___ mins 99320    Timed Treatment:   35   mins   Total Treatment:     56   mins    Sivan Akbar PT  Physical Therapist  [place] license #: [id number]

## 2024-06-28 ENCOUNTER — HOSPITAL ENCOUNTER (OUTPATIENT)
Dept: GENERAL RADIOLOGY | Facility: HOSPITAL | Age: 74
Discharge: HOME | End: 2024-06-28
Payer: MEDICARE

## 2024-06-28 ENCOUNTER — TREATMENT (OUTPATIENT)
Dept: PHYSICAL THERAPY | Facility: CLINIC | Age: 74
End: 2024-06-28
Payer: MEDICARE

## 2024-06-28 ENCOUNTER — HOSPITAL ENCOUNTER (OUTPATIENT)
Dept: MRI IMAGING | Facility: HOSPITAL | Age: 74
Discharge: HOME | End: 2024-06-28
Payer: MEDICARE

## 2024-06-28 DIAGNOSIS — R29.898 DECREASED STRENGTH OF LOWER EXTREMITY: ICD-10-CM

## 2024-06-28 DIAGNOSIS — M43.16 SPONDYLOLISTHESIS OF LUMBAR REGION: ICD-10-CM

## 2024-06-28 DIAGNOSIS — M25.652 DECREASED RANGE OF MOTION OF BOTH HIPS: Primary | ICD-10-CM

## 2024-06-28 DIAGNOSIS — M54.16 LUMBAR RADICULOPATHY: ICD-10-CM

## 2024-06-28 DIAGNOSIS — M53.86 DECREASED RANGE OF MOTION OF LUMBAR SPINE: ICD-10-CM

## 2024-06-28 DIAGNOSIS — M25.651 DECREASED RANGE OF MOTION OF BOTH HIPS: Primary | ICD-10-CM

## 2024-06-28 PROCEDURE — 72120 X-RAY BEND ONLY L-S SPINE: CPT

## 2024-06-28 PROCEDURE — A9577 INJ MULTIHANCE: HCPCS | Performed by: NURSE PRACTITIONER

## 2024-06-28 PROCEDURE — 0 GADOBENATE DIMEGLUMINE 529 MG/ML SOLUTION: Performed by: NURSE PRACTITIONER

## 2024-06-28 PROCEDURE — 72158 MRI LUMBAR SPINE W/O & W/DYE: CPT

## 2024-06-28 PROCEDURE — 82565 ASSAY OF CREATININE: CPT

## 2024-06-28 RX ADMIN — GADOBENATE DIMEGLUMINE 20 ML: 529 INJECTION, SOLUTION INTRAVENOUS at 11:39

## 2024-06-28 NOTE — PROGRESS NOTES
Physical Therapy Daily Treatment Note  [facility] Physical Therapy [place]  [address], [address]  [place] [id number]     Patient: Adelia Morris  : 1950  Referring practitioner: Haydee Gutierres APRN  Today's Date: 2024    VISIT#: 4    Visit Diagnoses:    ICD-10-CM ICD-9-CM   1. Decreased range of motion of both hips  M25.651 719.55    M25.652    2. Decreased strength of lower extremity  R29.898 729.89   3. Decreased range of motion of lumbar spine  M53.86 724.9       Subjective   Adelia Morris reports: Patient reports that her right hip is bothering her more today than her left. She was standing with her neighbor for a while last night and she could feel the pain start. She also has been in a car more and just feels stiff. She is having an MRI on her back today to make sure the spondylolisthesis hasn't moved more.       Objective       See Exercise, Manual, and Modality Logs for complete treatment.     Patient Education: HEP review  Exercise rationale/ pain free exercise performance  Alternate exercise positions  Verbal/Tactile cues to ensure correct exercise performance/technique       Assessment/Plan  Patient demonstrates good tolerance to therapeutic exercises on this date. Added piriformis/ hip ER and IR stretches to HEP. She continues to demonstrate an abnormal gait with decreased stride length and tip. Next visit will implement hip mobilizations and look at hip extension. Continue to progress as tolerated.       Progress per Plan of Care and Progress strengthening /stabilization /functional activity          Timed:         Manual Therapy:    8     mins  98845;     Therapeutic Exercise:    26     mins  47424;     Neuromuscular Anthony:    15    mins  57939;    Therapeutic Activity:     -     mins  45400;     Gait Training:           mins  90575;     Ultrasound:          mins  95059;    Ionto:                                   mins  93453  Self Care:                        5     mins  72130    Un-Timed:  Electrical Stimulation:         mins  26936 ( );  Dry Needling          mins self-pay  Traction          mins 54540  Re-Eval                               mins  04132  Group Therapy           ___ mins 64777    Timed Treatment:   54   mins   Total Treatment:     57   mins    Sivan Akbar PT  Physical Therapist  [facility] license #: [id number]

## 2024-06-29 LAB — CREAT BLDA-MCNC: 0.7 MG/DL (ref 0.6–1.3)

## 2024-07-02 ENCOUNTER — TELEPHONE (OUTPATIENT)
Dept: ORTHOPEDIC SURGERY | Facility: CLINIC | Age: 74
End: 2024-07-02
Payer: MEDICARE

## 2024-07-02 NOTE — TELEPHONE ENCOUNTER
Discussed lumbar MRI and lumbar flexion-extension results.  No progression of the spondylolisthesis at L5-S1 and no dynamic instability and motion.  She is actually initially fused at the level.  No other areas of central or foraminal narrowing.  She did get good results from the GTB injection with MIKE Fairbanks and can repeat that in September.  She is also finishing up physical therapy which has been helping.  She says overall her pain is greatly improved.  Follow-up as needed.

## 2024-07-03 ENCOUNTER — TREATMENT (OUTPATIENT)
Dept: PHYSICAL THERAPY | Facility: CLINIC | Age: 74
End: 2024-07-03
Payer: MEDICARE

## 2024-07-03 DIAGNOSIS — M25.652 DECREASED RANGE OF MOTION OF BOTH HIPS: Primary | ICD-10-CM

## 2024-07-03 DIAGNOSIS — M25.651 DECREASED RANGE OF MOTION OF BOTH HIPS: Primary | ICD-10-CM

## 2024-07-03 DIAGNOSIS — M53.86 DECREASED RANGE OF MOTION OF LUMBAR SPINE: ICD-10-CM

## 2024-07-03 DIAGNOSIS — R29.898 DECREASED STRENGTH OF LOWER EXTREMITY: ICD-10-CM

## 2024-07-03 NOTE — PROGRESS NOTES
Physical Therapy Daily Treatment Note  [facility] Physical Therapy [place]  [address], [address]  [place] [id number]     Patient: Adelia Morris  : 1950  Referring practitioner: Haydee Gutierres APRN  Today's Date: 7/3/2024    VISIT#: 5    Visit Diagnoses:    ICD-10-CM ICD-9-CM   1. Decreased range of motion of both hips  M25.651 719.55    M25.652    2. Decreased strength of lower extremity  R29.898 729.89   3. Decreased range of motion of lumbar spine  M53.86 724.9       Subjective   Adelia Morris reports: Patient reports that she was feeling pretty good until yesterday she bent over and her back is a little sore and stiff now.       Objective       See Exercise, Manual, and Modality Logs for complete treatment.     Patient Education: HEP review  Exercise rationale/ pain free exercise performance  Alternate exercise positions  Verbal/Tactile cues to ensure correct exercise performance/technique       Assessment/Plan  Patient demonstrates good tolerance to therapeutic exercises on this date with no lumbar pain reported throughout or at the end of the session. Added a supine hamstring stretch and a supine hip flexor stretch. Continued with LE strengthening and balance exercises. Verbal cues were provided for clamshells and straight leg raises, but otherwise she demonstrates good understanding of HEP. Will continue to progress strength as tolerated, progressing to more functional strength.       Progress per Plan of Care and Progress strengthening /stabilization /functional activity          Timed:         Manual Therapy:    -     mins  44869;     Therapeutic Exercise:    33    mins  63705;     Neuromuscular Anthony:    17    mins  41016;    Therapeutic Activity:     -     mins  92068;     Gait Training:           mins  52179;     Ultrasound:          mins  33836;    Ionto:                                   mins  70726  Self Care:                       -     mins   67863    Un-Timed:  Electrical Stimulation:         mins  40819 ( );  Dry Needling          mins self-pay  Traction          mins 39299  Re-Eval                               mins  79290  Group Therapy           ___ mins 13668    Timed Treatment:   50   mins   Total Treatment:     60   mins    Sivan Akbar PT  Physical Therapist  [facility] license #: [id number]

## 2024-07-05 ENCOUNTER — TREATMENT (OUTPATIENT)
Dept: PHYSICAL THERAPY | Facility: CLINIC | Age: 74
End: 2024-07-05
Payer: MEDICARE

## 2024-07-05 DIAGNOSIS — M25.651 DECREASED RANGE OF MOTION OF BOTH HIPS: Primary | ICD-10-CM

## 2024-07-05 DIAGNOSIS — R29.898 DECREASED STRENGTH OF LOWER EXTREMITY: ICD-10-CM

## 2024-07-05 DIAGNOSIS — M53.86 DECREASED RANGE OF MOTION OF LUMBAR SPINE: ICD-10-CM

## 2024-07-05 DIAGNOSIS — M25.652 DECREASED RANGE OF MOTION OF BOTH HIPS: Primary | ICD-10-CM

## 2024-07-05 NOTE — PROGRESS NOTES
Physical Therapy Daily Treatment Note  [facility] Physical Therapy [place]  [address], [address]  [place] [id number]     Patient: Adelia Morris  : 1950  Referring practitioner: Haydee Gutierres APRN  Today's Date: 2024    VISIT#: 6    Visit Diagnoses:    ICD-10-CM ICD-9-CM   1. Decreased range of motion of both hips  M25.651 719.55    M25.652    2. Decreased strength of lower extremity  R29.898 729.89   3. Decreased range of motion of lumbar spine  M53.86 724.9       Subjective   Adelia Morris reports: Patient reports that she is feeling some discomfort in her right hip as well. She is a little sore today. Leaning forward on the counter increases some back pain.       Objective       See Exercise, Manual, and Modality Logs for complete treatment.     Patient Education: HEP review  Exercise rationale/ pain free exercise performance  Alternate exercise positions  Verbal/Tactile cues to ensure correct exercise performance/technique       Assessment/Plan  Patient demonstrates good tolerance to therapeutic exercises and activities on this date. Continued with supine hip strengthening exercises on this date with no report of increased hip or lumbar pain. Added STS and stairs with knee  to progress to more functional activities. Will continue to progress as tolerated.       Progress per Plan of Care and Progress strengthening /stabilization /functional activity          Timed:         Manual Therapy:    -     mins  33760;     Therapeutic Exercise:    10     mins  67272;     Neuromuscular Anthony:    10    mins  29831;    Therapeutic Activity:     10     mins  83731;     Gait Training:           mins  04590;     Ultrasound:          mins  38727;    Ionto:                                   mins  83691  Self Care:                       -     mins  92529    Un-Timed:  Electrical Stimulation:         mins  82642 ( );  Dry Needling          mins self-pay  Traction           mins 29101  Re-Eval                               mins  39736  Group Therapy           ___ mins 94699    Timed Treatment:   30   mins   Total Treatment:     55   mins    Sivan Akbar PT  Physical Therapist  [place] license #: [id number]

## 2024-07-10 ENCOUNTER — TREATMENT (OUTPATIENT)
Dept: PHYSICAL THERAPY | Facility: CLINIC | Age: 74
End: 2024-07-10
Payer: MEDICARE

## 2024-07-10 DIAGNOSIS — R29.898 DECREASED STRENGTH OF LOWER EXTREMITY: ICD-10-CM

## 2024-07-10 DIAGNOSIS — M25.651 DECREASED RANGE OF MOTION OF BOTH HIPS: Primary | ICD-10-CM

## 2024-07-10 DIAGNOSIS — M53.86 DECREASED RANGE OF MOTION OF LUMBAR SPINE: ICD-10-CM

## 2024-07-10 DIAGNOSIS — M25.652 DECREASED RANGE OF MOTION OF BOTH HIPS: Primary | ICD-10-CM

## 2024-07-10 NOTE — PROGRESS NOTES
Physical Therapy Daily Treatment Note  [facility] Physical Therapy [place]  [address], [address]  [place] [address]     Patient: Adelia Morris  : 1950  Referring practitioner: Haydee Gutierres APRN  Today's Date: 7/10/2024    VISIT#: 7    Visit Diagnoses:    ICD-10-CM ICD-9-CM   1. Decreased range of motion of both hips  M25.651 719.55    M25.652    2. Decreased strength of lower extremity  R29.898 729.89   3. Decreased range of motion of lumbar spine  M53.86 724.9       Subjective   Adelia Morris reports: Patient reports that since  she has been having a sharp pain that shoots down from her hip to the outside of the knee making it difficult to walk. She said she has been having to use the wall and furniture to walk. Her back continues to feel better.       Objective       See Exercise, Manual, and Modality Logs for complete treatment.     Patient Education: HEP review  Exercise rationale/ pain free exercise performance  Alternate exercise positions  Verbal/Tactile cues to ensure correct exercise performance/technique       Assessment/Plan  Patient demonstrates good tolerance to therapeutic exercises and manual therapy on this date. She shows good improvements after using the massage gun for STM to the hip and lateral hip musculature. Afterwards she was able to walk with less pain. Modified clamshells to hooklying position to give hip abductors and ERs a rest. Continued with STS and balance activities with UE assist needed on this date and verbal cues for slow eccentric lowering. Discussed modifying clamshells at home for now as well. Will continue to progress as tolerated.       Progress per Plan of Care and Progress strengthening /stabilization /functional activity          Timed:         Manual Therapy:    8     mins  50842;     Therapeutic Exercise:    8     mins  76984;     Neuromuscular Anthony:    10    mins  36040;    Therapeutic Activity:     -     mins  26112;      Gait Training:           mins  48868;     Ultrasound:          mins  10305;    Ionto:                                   mins  80108  Self Care:                       4     mins  73150    Un-Timed:  Electrical Stimulation:         mins  81833 ( );  Dry Needling          mins self-pay  Traction          mins 16599  Re-Eval                               mins  66072  Group Therapy           ___ mins 69338    Timed Treatment:   30   mins   Total Treatment:     56   mins    Sivan Akbar PT  Physical Therapist  [place] [facility] license #: [id number]

## 2024-07-12 ENCOUNTER — TREATMENT (OUTPATIENT)
Dept: PHYSICAL THERAPY | Facility: CLINIC | Age: 74
End: 2024-07-12
Payer: MEDICARE

## 2024-07-12 DIAGNOSIS — M53.86 DECREASED RANGE OF MOTION OF LUMBAR SPINE: ICD-10-CM

## 2024-07-12 DIAGNOSIS — M25.652 DECREASED RANGE OF MOTION OF BOTH HIPS: Primary | ICD-10-CM

## 2024-07-12 DIAGNOSIS — M25.651 DECREASED RANGE OF MOTION OF BOTH HIPS: Primary | ICD-10-CM

## 2024-07-12 DIAGNOSIS — R29.898 DECREASED STRENGTH OF LOWER EXTREMITY: ICD-10-CM

## 2024-07-12 NOTE — PROGRESS NOTES
Physical Therapy Daily Treatment Note  [facility] Physical Therapy [place]  [address], [address]  [place] [address]     Patient: Adelia Morris  : 1950  Referring practitioner: Haydee Gutierres APRN  Today's Date: 2024    VISIT#: 8    Visit Diagnoses:    ICD-10-CM ICD-9-CM   1. Decreased range of motion of both hips  M25.651 719.55    M25.652    2. Decreased strength of lower extremity  R29.898 729.89   3. Decreased range of motion of lumbar spine  M53.86 724.9       Subjective   Adelia Morris reports: Patient reports that she is still having the pain down her L hip and down to the knee. Her back is still feeling better but there is some pain now that she is experiencing from the limping.       Objective       See Exercise, Manual, and Modality Logs for complete treatment.     Patient Education: HEP review  Exercise rationale/ pain free exercise performance  Alternate exercise positions  Verbal/Tactile cues to ensure correct exercise performance/technique       Assessment/Plan  Patient demonstrates good tolerance to therapeutic exercises on this date with some L hip discomfort reported throughout. Educated patient on the use of the massage gun and had her complete the STM on this date to ensure that she was able to complete this at home. Continued with LE strengthening and dynamic balancing. She was able to complete STS with no UE assist today but some valgus collapse bilaterally was noted throughout even with verbal cues, might attempt using a TB around knees for external cueing. Patient reports that she is feeling better at the end of the session than she did when she came in. Will continue to progress as tolerated.      Progress per Plan of Care and Progress strengthening /stabilization /functional activity          Timed:         Manual Therapy:    -     mins  64393;     Therapeutic Exercise:    25     mins  67425;     Neuromuscular Anthony:    15    mins  92705;     Therapeutic Activity:     10     mins  69606;     Gait Training:           mins  21326;     Ultrasound:          mins  74508;    Ionto:                                   mins  38076  Self Care:                       5     mins  56413    Un-Timed:  Electrical Stimulation:         mins  49387 ( );  Dry Needling          mins self-pay  Traction          mins 60918  Re-Eval                               mins  06451  Group Therapy           ___ mins 90357    Timed Treatment:   55   mins   Total Treatment:     55   mins    Sivan Akbar PT  Physical Therapist  [place] license #: [id number]

## 2024-07-15 ENCOUNTER — TREATMENT (OUTPATIENT)
Dept: PHYSICAL THERAPY | Facility: CLINIC | Age: 74
End: 2024-07-15
Payer: MEDICARE

## 2024-07-15 DIAGNOSIS — M25.652 DECREASED RANGE OF MOTION OF BOTH HIPS: Primary | ICD-10-CM

## 2024-07-15 DIAGNOSIS — M25.651 DECREASED RANGE OF MOTION OF BOTH HIPS: Primary | ICD-10-CM

## 2024-07-15 DIAGNOSIS — R29.898 DECREASED STRENGTH OF LOWER EXTREMITY: ICD-10-CM

## 2024-07-15 DIAGNOSIS — M53.86 DECREASED RANGE OF MOTION OF LUMBAR SPINE: ICD-10-CM

## 2024-07-15 NOTE — PROGRESS NOTES
Physical Therapy Daily Treatment Note/30 day re-assess    [place]  [address], [address]   [place]  [address]      Patient: Adelia Morris   : 1950  Referring practitioner: MIKE Rubalcava  Date of Initial Visit: Type: THERAPY  Noted: 2024  Today's Date: 7/15/2024  Patient seen for 9 sessions       Visit Diagnoses:    ICD-10-CM ICD-9-CM   1. Decreased range of motion of both hips  M25.651 719.55    M25.652    2. Decreased strength of lower extremity  R29.898 729.89   3. Decreased range of motion of lumbar spine  M53.86 724.9       Subjective Evaluation    History of Present Illness    Subjective comment: feeling better overall since starting PT. Keeping up with her HEP and able to get up down off the floor without much trouble. Still can't stand or walk for extended periods. Feels blessed that she has a natural fusion at L5-S1. Continues with tenderness from L trochanter towards knee.       Objective   See Exercise, Manual, and Modality Logs for complete treatment.       Assessment & Plan       Assessment  Assessment details: Pt seen for her 9th session today. Met 3/6 STGs and 1/5 LTGs.   Went down in her LEFS which could be a trend based on better understanding of functional tool at this time.   Pt needs ongoing PT to improve hip strength.   Might benefit from cupping along ITB.   Add more dynamic balance ex for fall prevention.         Goals  Plan Goals:  SHORT TERM GOALS:  1.  Patient to be compliant with HEP and demo good efficiency with TE. MET  2.  Report < 2 sleep disturbances 2° hip pain.   Partially met. 2x/night  3.  Increased Lumbar and hip mobility to allow for improved pelvic alignment and gait mechanics (equal step length and level pelvis throughout gait).  MET. Slow but symmetrical.   4.  Increased hip ER/IR ROM to WFL (IR to 30°) degrees to allow for increased ease with bed mobility and squatting. NOT MET. 25 degrees.   5. Pt will report minimal-no  tenderness to palpation with firm pressure. MET  6. Pt. Able to ambulate up to 15 min with pain < 5/10 with acceptable pattern.  NOT MET. Needs cart at stores and pain can increase to 7/10.      LONG TERM GOALS:  1.  Pt. to score > 80% perceived ability on LEFS. NOT MET. Was 42/80 now 20/80.  2.  Pain level < 3/10 in hips with all activities including sitting > 1 hr continuously. MET  3.  Hip  AROM to WNL to allow for return to ADL's/IADLS and functional activities without increased symptoms. Progressing.   4. Hip strength to 4+/5  to allow for pushing, pulling and more strenuous activities to occur without pain.  Walk > 30 min.  no pain. NOT MET  5. No palpable tenderness to the hip. NOT MET     Timed:         Manual Therapy:    8     mins  72166;     Therapeutic Exercise:    25     mins  26306;     Neuromuscular Anthony:        mins  75205;    Therapeutic Activity:          mins  31651;     Gait Training:           mins  61516;     Ultrasound:          mins  89826;    Ionto                                   mins   05442  Self Care                            mins   89422  Canalith Repos         mins 32194  Work hardening   __   min 00853/53474      Un-Timed:  Electrical Stimulation:         mins  37520 ( );  Dry Needling          mins self-pay  Traction          mins 71889      Timed Treatment:   33   mins   Total Treatment:     33   mins    Zorre Zeno Kimura, PT  [place] License: [id number]    [place] License:  [id number]

## 2024-07-23 ENCOUNTER — TREATMENT (OUTPATIENT)
Dept: PHYSICAL THERAPY | Facility: CLINIC | Age: 74
End: 2024-07-23
Payer: MEDICARE

## 2024-07-23 DIAGNOSIS — M25.651 DECREASED RANGE OF MOTION OF BOTH HIPS: Primary | ICD-10-CM

## 2024-07-23 DIAGNOSIS — M25.652 DECREASED RANGE OF MOTION OF BOTH HIPS: Primary | ICD-10-CM

## 2024-07-23 DIAGNOSIS — R29.898 DECREASED STRENGTH OF LOWER EXTREMITY: ICD-10-CM

## 2024-07-23 DIAGNOSIS — M53.86 DECREASED RANGE OF MOTION OF LUMBAR SPINE: ICD-10-CM

## 2024-07-23 NOTE — PROGRESS NOTES
Physical Therapy Daily Treatment Note    [facility] Physical Therapy [place]  [address], [address]  [place] [address]    Visit # 10        Patient: Adelia Morris   : 1950  Referring practitioner: MIKE Rubalcava  Date of Initial Evaluation:  Type: THERAPY  Noted: 2024  Today's Date: 2024           ICD-10-CM ICD-9-CM   1. Decreased range of motion of both hips  M25.651 719.55    M25.652    2. Decreased strength of lower extremity  R29.898 729.89   3. Decreased range of motion of lumbar spine  M53.86 724.9       Subjective  Adelia Morris reports:   Feeling better overall, still feeling some aching in (B) lower back and into (L) hip today.     Objective   See Exercise, Manual, and Modality Logs for complete treatment   Note: any exercises/interventions not performed today have been marked as deferred on flowsheets.     Pt Education:  HEP review  Posture/Postural awareness  Lumbar support  Alternate exercise positions  Verbal/Tactile cues to ensure correct exercise performance/technique    Assessment/Plan  Tolerated continued progression of therapeutic exercise/therapeutic activity well today, noting mild increased (L) hip pain at times with loading, but no lasting pain at conclusion of session.      Would continue to benefit from skilled PT progressing with functional WB and strength.     Progress per Plan of Care and Progress strengthening /stabilization /functional activity         Timed:         Manual Therapy:         mins  04293     Therapeutic Exercise:     15    mins  51056     Neuromuscular Anthony:    5    mins  11516    Therapeutic Activity:      10    mins  36584     Gait Training:           mins  25731     Ultrasound:          mins  62360    Ionto                                   mins  84212  Self Care                            mins  89650    Un-Timed:  Electrical Stimulation:         mins 70981 ( )  Traction          mins 14477    Timed  Treatment:   30   mins   Total Treatment:     39   mins    FARNAZ Espinoza License #[id number]  Physical Therapist Assistant

## 2024-07-26 ENCOUNTER — TREATMENT (OUTPATIENT)
Dept: PHYSICAL THERAPY | Facility: CLINIC | Age: 74
End: 2024-07-26
Payer: MEDICARE

## 2024-07-26 DIAGNOSIS — M25.651 DECREASED RANGE OF MOTION OF BOTH HIPS: Primary | ICD-10-CM

## 2024-07-26 DIAGNOSIS — M53.86 DECREASED RANGE OF MOTION OF LUMBAR SPINE: ICD-10-CM

## 2024-07-26 DIAGNOSIS — M25.652 DECREASED RANGE OF MOTION OF BOTH HIPS: Primary | ICD-10-CM

## 2024-07-26 DIAGNOSIS — R29.898 DECREASED STRENGTH OF LOWER EXTREMITY: ICD-10-CM

## 2024-07-26 NOTE — PROGRESS NOTES
Physical Therapy Daily Treatment Note  [facility]  [address], [address]  [place] [id number]     Patient: Adelia Morris  : 1950  Referring practitioner: Haydee Gutierres APRN  Today's Date: 2024    VISIT#: 11    Visit Diagnoses:    ICD-10-CM ICD-9-CM   1. Decreased range of motion of both hips  M25.651 719.55    M25.652    2. Decreased strength of lower extremity  R29.898 729.89   3. Decreased range of motion of lumbar spine  M53.86 724.9       Subjective   Adelia Morris reports: Patient reports that she is doing better, she can walk more with less hip pain. She feels stronger but still has some lower back pain along with L hip pain.      Objective       See Exercise, Manual, and Modality Logs for complete treatment.     Patient Education: HEP review  Exercise rationale/ pain free exercise performance  Alternate exercise positions  Verbal/Tactile cues to ensure correct exercise performance/technique       Assessment/Plan  Patient demonstrates good tolerance to therapeutic exercises on this date with no hip pain reported during or at the end of the session. Focused on manual therapy today with hip mobs and long axis distraction. Also, performed IASTM to L lateral hip structures. She reports decreased hip pain at the end of the session. Will continue with STM and progressing LE strength as tolerated.       Progress per Plan of Care and Progress strengthening /stabilization /functional activity          Timed:         Manual Therapy:    20     mins  47250;     Therapeutic Exercise:    15     mins  46855;     Neuromuscular Anthony:    10    mins  12637;    Therapeutic Activity:     -     mins  57654;     Gait Training:           mins  59588;     Ultrasound:          mins  63334;    Ionto:                                   mins  63071  Self Care:                       -     mins  77514    Un-Timed:  Electrical Stimulation:         mins  44094 (  );  Dry Needling          mins self-pay  Traction          mins 59182  Re-Eval                               mins  85256  Group Therapy           ___ mins 59990    Timed Treatment:   45   mins   Total Treatment:     59   mins    Sivan Akbar PT  Physical Therapist  [facility] license #: [id number]

## 2024-07-30 ENCOUNTER — TREATMENT (OUTPATIENT)
Dept: PHYSICAL THERAPY | Facility: CLINIC | Age: 74
End: 2024-07-30
Payer: MEDICARE

## 2024-07-30 DIAGNOSIS — M53.86 DECREASED RANGE OF MOTION OF LUMBAR SPINE: ICD-10-CM

## 2024-07-30 DIAGNOSIS — R29.898 DECREASED STRENGTH OF LOWER EXTREMITY: ICD-10-CM

## 2024-07-30 DIAGNOSIS — M25.652 DECREASED RANGE OF MOTION OF BOTH HIPS: Primary | ICD-10-CM

## 2024-07-30 DIAGNOSIS — M25.651 DECREASED RANGE OF MOTION OF BOTH HIPS: Primary | ICD-10-CM

## 2024-07-30 PROCEDURE — 97530 THERAPEUTIC ACTIVITIES: CPT

## 2024-07-30 PROCEDURE — 97112 NEUROMUSCULAR REEDUCATION: CPT

## 2024-07-30 NOTE — PROGRESS NOTES
Physical Therapy Daily Treatment Note  [facility] [place]  [address], [address]  [place] [id number]     Patient: Adelia Morris  : 1950  Referring practitioner: Haydee Gutierres APRN  Today's Date: 2024    VISIT#: 12    Visit Diagnoses:    ICD-10-CM ICD-9-CM   1. Decreased range of motion of both hips  M25.651 719.55    M25.652    2. Decreased strength of lower extremity  R29.898 729.89   3. Decreased range of motion of lumbar spine  M53.86 724.9       Subjective   Adelia Morris reports: Patient reports that she still has the achy spot on the outside of her hip. She can tell that she is stronger especially on her right side but her left hip still feels a little weak and like something is grabbing at the front of her hip.       Objective       See Exercise, Manual, and Modality Logs for complete treatment.     Patient Education: HEP review  Exercise rationale/ pain free exercise performance  Alternate exercise positions  Verbal/Tactile cues to ensure correct exercise performance/technique       Assessment/Plan  Patient demonstrates good tolerance to therapeutic exercises on this date with discomfort in the hip with standing exercises but no hip pain, she reports that it feels more sore than pain. Continued with IASTM but targeted the TFL muscle today. Practiced gait with a cane for use in the public as we continue to progress balance. Continued with functional activities including STS, stairs, and standing strengthening exercises. She was able to complete all but 3 of the 20 STS with no UE assist. Will continue to progress as tolerated.       Progress per Plan of Care and Progress strengthening /stabilization /functional activity          Timed:         Manual Therapy:    8     mins  68315;     Therapeutic Exercise:    -     mins  46990;     Neuromuscular Anthony:    8    mins  65400;    Therapeutic Activity:     14     mins  42026;     Gait Training:            mins  79517;     Ultrasound:          mins  18925;    Ionto:                                   mins  38056  Self Care:                       -     mins  71378    Un-Timed:  Electrical Stimulation:         mins  51376 ( );  Dry Needling          mins self-pay  Traction          mins 70073  Re-Eval                               mins  79519  Group Therapy           ___ mins 37680    Timed Treatment:   30   mins   Total Treatment:     60   mins    Sivan Akbar PT  Physical Therapist  [facility] license #: [id number]

## 2024-08-05 ENCOUNTER — TREATMENT (OUTPATIENT)
Dept: PHYSICAL THERAPY | Facility: CLINIC | Age: 74
End: 2024-08-05
Payer: MEDICARE

## 2024-08-05 DIAGNOSIS — M25.652 DECREASED RANGE OF MOTION OF BOTH HIPS: Primary | ICD-10-CM

## 2024-08-05 DIAGNOSIS — M25.651 DECREASED RANGE OF MOTION OF BOTH HIPS: Primary | ICD-10-CM

## 2024-08-05 DIAGNOSIS — R29.898 DECREASED STRENGTH OF LOWER EXTREMITY: ICD-10-CM

## 2024-08-05 DIAGNOSIS — M53.86 DECREASED RANGE OF MOTION OF LUMBAR SPINE: ICD-10-CM

## 2024-08-05 PROCEDURE — 97110 THERAPEUTIC EXERCISES: CPT

## 2024-08-05 PROCEDURE — 97112 NEUROMUSCULAR REEDUCATION: CPT

## 2024-08-05 NOTE — PROGRESS NOTES
Physical Therapy Daily Treatment Note  [facility] [place]  [address], [address]  [place] [id number]     Patient: Adelia Mroris  : 1950  Referring practitioner: Haydee Gutierres APRN  Today's Date: 2024    VISIT#: 13    Visit Diagnoses:    ICD-10-CM ICD-9-CM   1. Decreased range of motion of both hips  M25.651 719.55    M25.652    2. Decreased strength of lower extremity  R29.898 729.89   3. Decreased range of motion of lumbar spine  M53.86 724.9       Subjective   Adelia Morris reports: Patient reports that she continues to have a deep achy pain in her hip. She can feel that she is stronger but still has that deep achy pain.      Objective       See Exercise, Manual, and Modality Logs for complete treatment.     Patient Education: HEP review  Exercise rationale/ pain free exercise performance  Alternate exercise positions  Verbal/Tactile cues to ensure correct exercise performance/technique       Assessment/Plan  Patient continues to demonstrate good tolerance with therapeutic exercises and activities. She reports a deep ache on her hip throughout. Tandem stance continues to be difficult with UE touching throughout. Continued with functional activities and will continue to progress as tolerated.       Progress per Plan of Care and Progress strengthening /stabilization /functional activity          Timed:         Manual Therapy:    -     mins  31675;     Therapeutic Exercise:    15     mins  31247;     Neuromuscular Anthony:    10    mins  36016;    Therapeutic Activity:     -     mins  78688;     Gait Training:           mins  29258;     Ultrasound:          mins  78929;    Ionto:                                   mins  64676  Self Care:                       5     mins  50353    Un-Timed:  Electrical Stimulation:         mins  40662 ( );  Dry Needling          mins self-pay  Traction          mins 56498  Re-Eval                               mins   81791  Group Therapy           ___ mins 66886    Timed Treatment:   30   mins   Total Treatment:     48   mins    Sivan Akbar PT  Physical Therapist  [facility] license #: [id number]

## 2024-08-13 ENCOUNTER — TREATMENT (OUTPATIENT)
Dept: PHYSICAL THERAPY | Facility: CLINIC | Age: 74
End: 2024-08-13
Payer: MEDICARE

## 2024-08-13 DIAGNOSIS — R29.898 DECREASED STRENGTH OF LOWER EXTREMITY: ICD-10-CM

## 2024-08-13 DIAGNOSIS — M25.652 DECREASED RANGE OF MOTION OF BOTH HIPS: Primary | ICD-10-CM

## 2024-08-13 DIAGNOSIS — M53.86 DECREASED RANGE OF MOTION OF LUMBAR SPINE: ICD-10-CM

## 2024-08-13 DIAGNOSIS — M25.651 DECREASED RANGE OF MOTION OF BOTH HIPS: Primary | ICD-10-CM

## 2024-08-13 PROCEDURE — 97112 NEUROMUSCULAR REEDUCATION: CPT

## 2024-08-13 PROCEDURE — 97140 MANUAL THERAPY 1/> REGIONS: CPT

## 2024-08-13 PROCEDURE — 97530 THERAPEUTIC ACTIVITIES: CPT

## 2024-08-13 NOTE — PROGRESS NOTES
Physical Therapy Daily Treatment Note  [facility] [place]  [address], [address]  [place] [address]     Patient: Adelia Morris  : 1950  Referring practitioner: Haydee Gutierres APRN  Today's Date: 2024    VISIT#: 14    Visit Diagnoses:    ICD-10-CM ICD-9-CM   1. Decreased range of motion of both hips  M25.651 719.55    M25.652    2. Decreased strength of lower extremity  R29.898 729.89   3. Decreased range of motion of lumbar spine  M53.86 724.9       Subjective Evaluation    Pain  Current pain rating: 3  At best pain ratin  At worst pain ratin       LEFS: 20-37%  Adelia Morris reports: Patient reports that she has seen about a 65-70% improvement. She still feels like strength is improving but she is still having a sharp pain in her hip. She has an appointment for  for another injection.       Objective       See Exercise, Manual, and Modality Logs for complete treatment.     Patient Education: HEP review  Exercise rationale/ pain free exercise performance  Alternate exercise positions  Verbal/Tactile cues to ensure correct exercise performance/technique       Assessment/Plan  Patient demonstrates good tolerance to therapeutic exercises on this date. We discussed taking a break from coming into the clinic but continuing with HEP in the meantime. She is returning to get another hip injection on  and will keep us updated. She continues to have hip pain with ambulation but has seen improvement with strength and being able to get on and off the floor and other ADLs. However, the lateral and posterior hip pain keep her from being able to walk on uneven surfaces, sleep on her left hip, and getting in and out of a bath tub. She reports that the pain is kind of all over and changes from day to day. After using the massage gun patient was able to ambulate with less pain in the piriformis region but reports still feeling stiff.  Discussed continuing HEP, answered any questions that she had and provided her with my contact information for any further questions.       Progress per Plan of Care and Progress strengthening /stabilization /functional activity          Timed:         Manual Therapy:    8     mins  05495;     Therapeutic Exercise:    -     mins  52407;     Neuromuscular Anthony:    15    mins  04150;    Therapeutic Activity:     30     mins  16767;     Gait Training:           mins  28493;     Ultrasound:          mins  32936;    Ionto:                                   mins  94856  Self Care:                       -     mins  70866    Un-Timed:  Electrical Stimulation:         mins  90109 ( );  Dry Needling          mins self-pay  Traction          mins 79992  Re-Eval                               mins  21851  Group Therapy           ___ mins 88750    Timed Treatment:   53   mins   Total Treatment:     60   mins    Sivan Akbar PT  Physical Therapist  [facility] license #: [id number]

## 2024-08-14 ENCOUNTER — OFFICE VISIT (OUTPATIENT)
Dept: INTERNAL MEDICINE | Facility: CLINIC | Age: 74
End: 2024-08-14
Payer: MEDICARE

## 2024-08-14 VITALS
SYSTOLIC BLOOD PRESSURE: 126 MMHG | HEART RATE: 89 BPM | HEIGHT: 66 IN | TEMPERATURE: 98.3 F | WEIGHT: 191 LBS | OXYGEN SATURATION: 96 % | BODY MASS INDEX: 30.7 KG/M2 | DIASTOLIC BLOOD PRESSURE: 80 MMHG

## 2024-08-14 DIAGNOSIS — M85.851 OSTEOPENIA OF BOTH HIPS: ICD-10-CM

## 2024-08-14 DIAGNOSIS — E55.9 VITAMIN D DEFICIENCY: ICD-10-CM

## 2024-08-14 DIAGNOSIS — Z00.00 MEDICARE ANNUAL WELLNESS VISIT, SUBSEQUENT: Primary | ICD-10-CM

## 2024-08-14 DIAGNOSIS — M85.852 OSTEOPENIA OF BOTH HIPS: ICD-10-CM

## 2024-08-14 LAB
25(OH)D3+25(OH)D2 SERPL-MCNC: 37 NG/ML (ref 30–100)
ALBUMIN SERPL-MCNC: 4.4 G/DL (ref 3.5–5.2)
ALBUMIN/GLOB SERPL: 1.8 G/DL
ALP SERPL-CCNC: 70 U/L (ref 39–117)
ALT SERPL-CCNC: 12 U/L (ref 1–33)
AST SERPL-CCNC: 11 U/L (ref 1–32)
BASOPHILS # BLD AUTO: 0.07 10*3/MM3 (ref 0–0.2)
BASOPHILS NFR BLD AUTO: 1.3 % (ref 0–1.5)
BILIRUB SERPL-MCNC: 0.5 MG/DL (ref 0–1.2)
BUN SERPL-MCNC: 10 MG/DL (ref 8–23)
BUN/CREAT SERPL: 13.2 (ref 7–25)
CALCIUM SERPL-MCNC: 9.5 MG/DL (ref 8.6–10.5)
CHLORIDE SERPL-SCNC: 107 MMOL/L (ref 98–107)
CO2 SERPL-SCNC: 25.4 MMOL/L (ref 22–29)
CREAT SERPL-MCNC: 0.76 MG/DL (ref 0.57–1)
EGFRCR SERPLBLD CKD-EPI 2021: 82.9 ML/MIN/1.73
EOSINOPHIL # BLD AUTO: 0.09 10*3/MM3 (ref 0–0.4)
EOSINOPHIL NFR BLD AUTO: 1.6 % (ref 0.3–6.2)
ERYTHROCYTE [DISTWIDTH] IN BLOOD BY AUTOMATED COUNT: 12.8 % (ref 12.3–15.4)
GLOBULIN SER CALC-MCNC: 2.5 GM/DL
GLUCOSE SERPL-MCNC: 92 MG/DL (ref 65–99)
HCT VFR BLD AUTO: 40.8 % (ref 34–46.6)
HGB BLD-MCNC: 13.5 G/DL (ref 12–15.9)
IMM GRANULOCYTES # BLD AUTO: 0.01 10*3/MM3 (ref 0–0.05)
IMM GRANULOCYTES NFR BLD AUTO: 0.2 % (ref 0–0.5)
LYMPHOCYTES # BLD AUTO: 1.52 10*3/MM3 (ref 0.7–3.1)
LYMPHOCYTES NFR BLD AUTO: 27.3 % (ref 19.6–45.3)
MCH RBC QN AUTO: 30.5 PG (ref 26.6–33)
MCHC RBC AUTO-ENTMCNC: 33.1 G/DL (ref 31.5–35.7)
MCV RBC AUTO: 92.3 FL (ref 79–97)
MONOCYTES # BLD AUTO: 0.49 10*3/MM3 (ref 0.1–0.9)
MONOCYTES NFR BLD AUTO: 8.8 % (ref 5–12)
NEUTROPHILS # BLD AUTO: 3.39 10*3/MM3 (ref 1.7–7)
NEUTROPHILS NFR BLD AUTO: 60.8 % (ref 42.7–76)
NRBC BLD AUTO-RTO: 0 /100 WBC (ref 0–0.2)
PLATELET # BLD AUTO: 283 10*3/MM3 (ref 140–450)
POTASSIUM SERPL-SCNC: 4.3 MMOL/L (ref 3.5–5.2)
PROT SERPL-MCNC: 6.9 G/DL (ref 6–8.5)
RBC # BLD AUTO: 4.42 10*6/MM3 (ref 3.77–5.28)
SODIUM SERPL-SCNC: 143 MMOL/L (ref 136–145)
WBC # BLD AUTO: 5.57 10*3/MM3 (ref 3.4–10.8)

## 2024-08-14 NOTE — PROGRESS NOTES
" Subjective   The ABCs of the Annual Wellness Visit  Medicare Wellness Visit    Adelia Morris is a 74 y.o. patient who presents for a Medicare Wellness Visit.    The following portions of the patient's history were reviewed and   updated as appropriate: allergies, current medications, past family history, past medical history, past social history, past surgical history, and problem list.    DDD (degenerative disc disease), lumbar : She has chronic lumbar degenerative disease as well as pars defect of lumbar spine. Is currently in PT. Follows with [facility].    Osteopenia: 10/2022 DEXA 10-year FRAX (World Health Organization) fracture risk for a major osteoporotic fracture is 19% and for a hip fracture is 6.5%. Has no interest in medication therapy at this time. She takes a vitamin D supplement. She does walking around the house and cycles at her desk.     Compared to one year ago, the patient's physical   health is the same.  Compared to one year ago, the patient's mental   health is the same. \"It's good\"    Recent Hospitalizations:  She was not admitted to the hospital during the last year.     Current Medical Providers:  Patient Care Team:  Christian Kovacs DO as PCP - General (Internal Medicine)  Lacy Avila MD as Consulting Physician (Gynecology)  Rafael Espinoza MD as Consulting Physician (Dermatology)    Outpatient Medications Prior to Visit   Medication Sig Dispense Refill    Calcium Carbonate-Vitamin D (CALTRATE 600+D PO) Take  by mouth.      cetirizine (zyrTEC) 10 MG tablet Take 1 tablet by mouth Daily.      Misc Natural Products (OSTEO BI-FLEX JOINT SHIELD PO) Take 1 tablet by mouth 2 (Two) Times a Day.      fluticasone (FLONASE) 50 MCG/ACT nasal spray 2 sprays by Each Nare route Daily. (Patient not taking: Reported on 7/20/2023) 16 mL 3    Probiotic Product (PROBIOTIC PEARLS WOMENS PO) Take  by mouth. (Patient not taking: Reported on 1/22/2024)       No facility-administered medications prior to " "visit.     No opioid medication identified on active medication list. I have reviewed chart for other potential  high risk medication/s and harmful drug interactions in the elderly.      Aspirin is not on active medication list.  Aspirin use is not indicated based on review of current medical condition/s. Risk of harm outweighs potential benefits.  .    Patient Active Problem List   Diagnosis    Healthcare maintenance    Encounter for screening colonoscopy    Colon polyp    Acute right-sided low back pain with sciatica    Allergic drug rash    Osteopenia of hip    Cutaneous cyst    History of colon polyps     Advance Care Planning Advance Directive is not on file.  ACP discussion was held with the patient during this visit. Patient has an advance directive (not in EMR), copy requested.            Objective   Vitals:    08/14/24 0841   BP: 126/80   Pulse: 89   Temp: 98.3 °F (36.8 °C)   TempSrc: Oral   SpO2: 96%   Weight: 86.6 kg (191 lb)   Height: 167.6 cm (66\")   Physical Exam  Vitals reviewed.   Constitutional:       General: She is not in acute distress.     Appearance: She is obese. She is not ill-appearing.   HENT:      Right Ear: Tympanic membrane, ear canal and external ear normal. There is no impacted cerumen.      Left Ear: Tympanic membrane, ear canal and external ear normal. There is no impacted cerumen.      Mouth/Throat:      Mouth: Mucous membranes are moist.      Pharynx: Oropharynx is clear. No oropharyngeal exudate or posterior oropharyngeal erythema.   Eyes:      General: No scleral icterus.  Cardiovascular:      Rate and Rhythm: Normal rate and regular rhythm.      Heart sounds: Normal heart sounds. No murmur heard.  Pulmonary:      Effort: Pulmonary effort is normal. No respiratory distress.      Breath sounds: Normal breath sounds. No wheezing.   Abdominal:      General: Bowel sounds are normal. There is no distension.      Palpations: Abdomen is soft.      Tenderness: There is no abdominal " "tenderness. There is no guarding.   Musculoskeletal:      Right lower leg: Edema (trace ankle) present.      Left lower leg: Edema (trace ankle) present.   Skin:     Coloration: Skin is not jaundiced.      Comments: Varicose veins b/l lower extremities     Neurological:      Mental Status: She is alert.   Psychiatric:         Mood and Affect: Mood normal.         Behavior: Behavior normal.         Thought Content: Thought content normal.           Estimated body mass index is 30.83 kg/m² as calculated from the following:    Height as of this encounter: 167.6 cm (66\").    Weight as of this encounter: 86.6 kg (191 lb).            Does the patient have evidence of cognitive impairment? No                                                                                               Health  Risk Assessment    Smoking Status:  Social History     Tobacco Use   Smoking Status Never   Smokeless Tobacco Never     Alcohol Consumption:  Social History     Substance and Sexual Activity   Alcohol Use Never       Fall Risk Screen  STEADI Fall Risk Assessment was completed, and patient is at LOW risk for falls.Assessment completed on:2024    Depression Screenin/14/2024     8:47 AM   PHQ-2/PHQ-9 Depression Screening   Little Interest or Pleasure in Doing Things 0-->not at all   Feeling Down, Depressed or Hopeless 0-->not at all   PHQ-9: Brief Depression Severity Measure Score 0     Health Habits and Functional and Cognitive Screenin/14/2024     8:45 AM   Functional & Cognitive Status   Do you have difficulty preparing food and eating? No   Do you have difficulty bathing yourself, getting dressed or grooming yourself? No   Do you have difficulty using the toilet? No   Do you have difficulty moving around from place to place? No   Do you have trouble with steps or getting out of a bed or a chair? No   Current Diet Well Balanced Diet   Dental Exam Up to date   Eye Exam Up to date   Exercise (times per week) 4 " times per week        Exercise Comment physical therapy   Do you need help using the phone?  No   Are you deaf or do you have serious difficulty hearing?  No   Do you need help to go to places out of walking distance? No   Do you need help shopping? No   Do you need help preparing meals?  No   Do you need help with housework?  No   Do you need help with laundry? No   Do you need help taking your medications? No   Do you need help managing money? No   Do you ever drive or ride in a car without wearing a seat belt? No   Have you felt unusual stress, anger or loneliness in the last month? No   Who do you live with? Spouse   If you need help, do you have trouble finding someone available to you? No   Do you have difficulty concentrating, remembering or making decisions? No           Age-appropriate Screening Schedule:  Refer to the list below for future screening recommendations based on patient's age, sex and/or medical conditions. Orders for these recommended tests are listed in the plan section. The patient has been provided with a written plan.    Health Maintenance List  Health Maintenance   Topic Date Due    COVID-19 Vaccine (3 - 2023-24 season) 09/01/2023    INFLUENZA VACCINE  08/01/2024    DXA SCAN  10/06/2024    PAP SMEAR  03/30/2025    BMI FOLLOWUP  06/10/2025    ANNUAL WELLNESS VISIT  08/14/2025    MAMMOGRAM  03/04/2026    COLORECTAL CANCER SCREENING  12/02/2026    TDAP/TD VACCINES (2 - Td or Tdap) 11/17/2031    HEPATITIS C SCREENING  Completed    Pneumococcal Vaccine 65+  Completed    ZOSTER VACCINE  Completed                                                                                                                                                CMS Preventative Services Quick Reference  Risk Factors Identified During Encounter  Immunizations Discussed/Encouraged: Influenza and COVID19  Inactivity/Sedentary: Patient was advised to exercise at least 150 minutes a week per CDC recommendations.    The  above risks/problems have been discussed with the patient.  Pertinent information has been shared with the patient in the After Visit Summary.  An After Visit Summary and PPPS were made available to the patient.    Follow Up:   Next Medicare Wellness visit to be scheduled in 1 year.      A problem-based visit was also conducted on the same day, see below for assessment and plan    Diagnoses and all orders for this visit:    1. Osteopenia of both hips (Primary)  2. Vitamin D deficiency  -10/2022 DEXA 10-year FRAX (World Health Organization) fracture risk for a major osteoporotic fracture is 19% and for a hip fracture is 6.5%. Has no interest in medication therapy at this time. She takes a vitamin D supplement. She does walking around the house and cycles at her desk.   -We discussed the potential morbidity and mortality with falls and hip fractures including prolonged bedrest, pneumonia, bedsores.  We discussed that there are medicines available that can reduce the progression of osteopenia.  Overall she is not interested in therapy.  We discussed the utility of further bone density scans if she is going to decline therapy and at this point she declines further bone density scans.  Advised her to let me know at any point if she changes her mind.  She is gotten better about taking her vitamin D over-the-counter.  Her vitamin D was slightly low January 2024 at 29.1.  I will recheck vitamin D and calcium today.  I stressed the importance of weightbearing exercise at least 30 minutes 5 days/week.  -     Vitamin D,25-Hydroxy  -     Comprehensive Metabolic Panel              The following social determinates of health impact the patient's medical decision making: No social determinates of health were factored in to today's visit.     Follow Up  Return in about 1 year (around 8/14/2025) for Medicare Wellness.

## 2024-09-05 ENCOUNTER — OFFICE VISIT (OUTPATIENT)
Dept: ORTHOPEDIC SURGERY | Facility: CLINIC | Age: 74
End: 2024-09-05
Payer: MEDICARE

## 2024-09-05 VITALS — HEIGHT: 66 IN | TEMPERATURE: 98.7 F | BODY MASS INDEX: 31.34 KG/M2 | WEIGHT: 195 LBS

## 2024-09-05 DIAGNOSIS — M70.62 GREATER TROCHANTERIC BURSITIS OF LEFT HIP: ICD-10-CM

## 2024-09-05 DIAGNOSIS — R52 PAIN: Primary | ICD-10-CM

## 2024-09-05 RX ORDER — LIDOCAINE HYDROCHLORIDE 10 MG/ML
2 INJECTION, SOLUTION EPIDURAL; INFILTRATION; INTRACAUDAL; PERINEURAL
Status: COMPLETED | OUTPATIENT
Start: 2024-09-05 | End: 2024-09-05

## 2024-09-05 RX ORDER — METHYLPREDNISOLONE ACETATE 80 MG/ML
80 INJECTION, SUSPENSION INTRA-ARTICULAR; INTRALESIONAL; INTRAMUSCULAR; SOFT TISSUE
Status: COMPLETED | OUTPATIENT
Start: 2024-09-05 | End: 2024-09-05

## 2024-09-05 RX ADMIN — METHYLPREDNISOLONE ACETATE 80 MG: 80 INJECTION, SUSPENSION INTRA-ARTICULAR; INTRALESIONAL; INTRAMUSCULAR; SOFT TISSUE at 09:20

## 2024-09-05 RX ADMIN — LIDOCAINE HYDROCHLORIDE 2 ML: 10 INJECTION, SOLUTION EPIDURAL; INFILTRATION; INTRACAUDAL; PERINEURAL at 09:20

## 2024-09-05 NOTE — PROGRESS NOTES
Patient: Adelia Morris  YOB: 1950  Date of Service: 9/5/2024    Chief Complaints:   Chief Complaint   Patient presents with    Left Hip - Pain, Follow-up       Subjective: Here for pain in the greater trochanter bursa and desires conservative treatment in the left hip patient follows with Haydee Banning and has a history of low back problems    History of Present Illness: Pt is seen in the office today with complaints of   Chief Complaint   Patient presents with    Left Hip - Pain, Follow-up   .  HIP: TIMING:  The pain is described as ACUTE ON CHRONIC.  AGGRAVATING FACTORS:  Is worsened by prolonged standing, sitting, and walking activities.  CHARACTERISTICS:  aching, stiffness, and difficulty walking.    This problem is not new to this examiner.     Allergies:   Allergies   Allergen Reactions    Bactrim [Sulfamethoxazole-Trimethoprim] Rash    Clindamycin/Lincomycin Rash       Medications:   Home Medications:  Current Outpatient Medications on File Prior to Visit   Medication Sig    Calcium Carbonate-Vitamin D (CALTRATE 600+D PO) Take  by mouth.    cetirizine (zyrTEC) 10 MG tablet Take 1 tablet by mouth Daily.    Misc Natural Products (OSTEO BI-FLEX JOINT SHIELD PO) Take 1 tablet by mouth 2 (Two) Times a Day.     No current facility-administered medications on file prior to visit.     Current Medications:  Scheduled Meds:  Continuous Infusions:No current facility-administered medications for this visit.    PRN Meds:.    I have reviewed the patient's medical history in detail and updated the computerized patient record.  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    Low back pain with right-sided sciatica     Low back strain     Menopause     Neck strain     Osteopenia     UTI (urinary tract infection)         Past Surgical History:   Procedure  Laterality Date    BUNIONECTOMY      right     SECTION  1993    COLONOSCOPY N/A 2016    Procedure: COLONOSCOPY ith polypectomy (cold bx);  Surgeon: Jarrod John Jr., MD;  Location:  [facility];  Service:     COLONOSCOPY      WNL PER PT      COLONOSCOPY N/A 2021    Procedure: COLONOSCOPY TO CECUM AND TI;  Surgeon: Jarrod John Jr., MD;  Location:  [facility];  Service: General;  Laterality: N/A;  pre: history of colon polyps  post: diverticulosis    EYE SURGERY      cataracts    LAPAROSCOPIC TUBAL LIGATION          Social History     Occupational History     Employer: RETIRED     Comment:    Tobacco Use    Smoking status: Never    Smokeless tobacco: Never   Vaping Use    Vaping status: Never Used   Substance and Sexual Activity    Alcohol use: Never    Drug use: Never    Sexual activity: Yes     Partners: Male     Birth control/protection: Tubal ligation      Social History     Social History Narrative    Not on file        Family History   Problem Relation Age of Onset    Osteoporosis Mother     Cancer Father         BONE MARROW    Hypertension Sister     Thyroid disease Sister     Breast cancer Neg Hx        ROS: 14 point review of systems was performed and was negative except for documented findings in HPI and today's encounter.     Allergies:   Allergies   Allergen Reactions    Bactrim [Sulfamethoxazole-Trimethoprim] Rash    Clindamycin/Lincomycin Rash     Constitutional:  Denies fever, shaking or chills   Eyes:  Denies change in visual acuity   HENT:  Denies nasal congestion or sore throat   Respiratory:  Denies cough or shortness of breath   Cardiovascular:  Denies chest pain or severe LE edema   GI:  Denies abdominal pain, nausea, vomiting, bloody stools or diarrhea   Musculoskeletal:  Numbness, tingling, or loss of motor function only as noted above in history of present illness.  : Denies painful urination or  hematuria  Integument:  Denies rash, lesion or ulceration   Neurologic:  Denies headache or focal weakness  Endocrine:  Denies lymphadenopathy  Psych:  Denies confusion or change in mental status   Hem:  Denies active bleeding      Physical Exam: 74 y.o. female  Wt Readings from Last 3 Encounters:   09/05/24 88.5 kg (195 lb)   08/14/24 86.6 kg (191 lb)   06/10/24 86.6 kg (191 lb)     Body mass index is 31.47 kg/m².  Facility age limit for growth %kelly is 20 years.  Vitals:    09/05/24 0902   Temp: 98.7 °F (37.1 °C)     Vital signs reviewed.   General Appearance:    Alert, cooperative, in no acute distress                  Eyes: conjunctiva clear  ENT: external ears and nose atraumatic  CV: no peripheral edema  Resp: normal respiratory effort  Skin: no rashes or wounds; normal turgor  Psych: mood and affect appropriate  Lymph: no nodes appreciated  Neuro: gross sensation intact  Vascular:  Palpable peripheral pulse in noted extremity  Musculoskeletal Extremities: HIP Exam: normal gait without assistive device left hip 2+ pedal pulses and brisk capillary refill Pedal edema none      Diagnostic Data:  Imaging done today and discussed with the patient:    Indication: pain related symptoms,  Views: 3V AP, LAT & 40 degree PA left hip(s)   Findings: moderate arthritis  Comparison views: viewed last xray done in the office.      Procedure:  Large Joint Arthrocentesis: L greater trochanteric bursa  Date/Time: 9/5/2024 9:20 AM  Consent given by: patient  Site marked: site marked  Timeout: Immediately prior to procedure a time out was called to verify the correct patient, procedure, equipment, support staff and site/side marked as required   Supporting Documentation  Indications: pain   Procedure Details  Location: hip - L greater trochanteric bursa  Preparation: Patient was prepped and draped in the usual sterile fashion  Needle gauge: 21 G.  Approach: lateral  Medications administered: 80 mg methylPREDNISolone acetate 80  MG/ML; 2 mL lidocaine PF 1% 1 %  Patient tolerance: patient tolerated the procedure well with no immediate complications        Assessment:     ICD-10-CM ICD-9-CM   1. Pain  R52 780.96   2. Greater trochanteric bursitis of left hip  M70.62 726.5       Plan:   Follow up as indicated.  Ice, elevate, and rest as needed.  The diagnosis(es), natural history, pathophysiology and treatment for diagnosis(es) were discussed. Opportunity given and questions answered.  Biomechanics of pertinent body areas discussed.  When appropriate, the use of ambulatory aids discussed.  BMI:  The concept of BMI body mass index and its importance and implications discussed.    EXERCISES:  Advice on benefits of, and types of regular/moderate exercise pertaining to orthopedic diagnosis(es).  MEDICATIONS:  The risks, benefits, warnings,side effects and alternatives of medications discussed.  Inflammation/pain control; with cold, heat, elevation and/or liniments discussed as appropriate  Cortisone Injection. See procedure note.  Handouts given for home physical therapy exercises  MEDICAL RECORDS reviewed from other provider(s) for past and current medical history pertinent to this complaint.    9/5/2024

## 2024-09-18 ENCOUNTER — TREATMENT (OUTPATIENT)
Dept: PHYSICAL THERAPY | Facility: CLINIC | Age: 74
End: 2024-09-18
Payer: MEDICARE

## 2024-09-18 DIAGNOSIS — M25.552 CHRONIC HIP PAIN, LEFT: Primary | ICD-10-CM

## 2024-09-18 DIAGNOSIS — G89.29 CHRONIC HIP PAIN, LEFT: Primary | ICD-10-CM

## 2024-09-18 PROCEDURE — 97530 THERAPEUTIC ACTIVITIES: CPT

## 2024-09-18 PROCEDURE — 97535 SELF CARE MNGMENT TRAINING: CPT

## 2024-11-11 ENCOUNTER — OFFICE VISIT (OUTPATIENT)
Dept: INTERNAL MEDICINE | Facility: CLINIC | Age: 74
End: 2024-11-11
Payer: MEDICARE

## 2024-11-11 VITALS
HEART RATE: 60 BPM | TEMPERATURE: 98.2 F | HEIGHT: 66 IN | DIASTOLIC BLOOD PRESSURE: 80 MMHG | OXYGEN SATURATION: 97 % | WEIGHT: 195 LBS | SYSTOLIC BLOOD PRESSURE: 128 MMHG | BODY MASS INDEX: 31.34 KG/M2

## 2024-11-11 DIAGNOSIS — R00.2 PALPITATIONS: ICD-10-CM

## 2024-11-11 DIAGNOSIS — I49.8 VENTRICULAR BIGEMINY: ICD-10-CM

## 2024-11-11 DIAGNOSIS — R07.89 SENSATION OF CHEST PRESSURE: ICD-10-CM

## 2024-11-11 DIAGNOSIS — R53.83 FATIGUE, UNSPECIFIED TYPE: Primary | ICD-10-CM

## 2024-11-11 LAB
ALBUMIN SERPL-MCNC: 4.2 G/DL (ref 3.5–5.2)
ALBUMIN/GLOB SERPL: 1.8 G/DL
ALP SERPL-CCNC: 67 U/L (ref 39–117)
ALT SERPL-CCNC: 11 U/L (ref 1–33)
AST SERPL-CCNC: 13 U/L (ref 1–32)
BASOPHILS # BLD AUTO: 0.05 10*3/MM3 (ref 0–0.2)
BASOPHILS NFR BLD AUTO: 0.8 % (ref 0–1.5)
BILIRUB SERPL-MCNC: 0.4 MG/DL (ref 0–1.2)
BUN SERPL-MCNC: 10 MG/DL (ref 8–23)
BUN/CREAT SERPL: 12.2 (ref 7–25)
CALCIUM SERPL-MCNC: 9.4 MG/DL (ref 8.6–10.5)
CHLORIDE SERPL-SCNC: 107 MMOL/L (ref 98–107)
CO2 SERPL-SCNC: 25 MMOL/L (ref 22–29)
CREAT SERPL-MCNC: 0.82 MG/DL (ref 0.57–1)
EGFRCR SERPLBLD CKD-EPI 2021: 75.6 ML/MIN/1.73
EOSINOPHIL # BLD AUTO: 0.06 10*3/MM3 (ref 0–0.4)
EOSINOPHIL NFR BLD AUTO: 1 % (ref 0.3–6.2)
ERYTHROCYTE [DISTWIDTH] IN BLOOD BY AUTOMATED COUNT: 12.3 % (ref 12.3–15.4)
GLOBULIN SER CALC-MCNC: 2.4 GM/DL
GLUCOSE SERPL-MCNC: 97 MG/DL (ref 65–99)
HCT VFR BLD AUTO: 39.5 % (ref 34–46.6)
HGB BLD-MCNC: 13.4 G/DL (ref 12–15.9)
IMM GRANULOCYTES # BLD AUTO: 0.02 10*3/MM3 (ref 0–0.05)
IMM GRANULOCYTES NFR BLD AUTO: 0.3 % (ref 0–0.5)
LYMPHOCYTES # BLD AUTO: 1.52 10*3/MM3 (ref 0.7–3.1)
LYMPHOCYTES NFR BLD AUTO: 25 % (ref 19.6–45.3)
MAGNESIUM SERPL-MCNC: 2.1 MG/DL (ref 1.6–2.4)
MCH RBC QN AUTO: 30.9 PG (ref 26.6–33)
MCHC RBC AUTO-ENTMCNC: 33.9 G/DL (ref 31.5–35.7)
MCV RBC AUTO: 91.2 FL (ref 79–97)
MONOCYTES # BLD AUTO: 0.54 10*3/MM3 (ref 0.1–0.9)
MONOCYTES NFR BLD AUTO: 8.9 % (ref 5–12)
NEUTROPHILS # BLD AUTO: 3.89 10*3/MM3 (ref 1.7–7)
NEUTROPHILS NFR BLD AUTO: 64 % (ref 42.7–76)
NRBC BLD AUTO-RTO: 0 /100 WBC (ref 0–0.2)
PHOSPHATE SERPL-MCNC: 3.4 MG/DL (ref 2.5–4.5)
PLATELET # BLD AUTO: 260 10*3/MM3 (ref 140–450)
POTASSIUM SERPL-SCNC: 4.2 MMOL/L (ref 3.5–5.2)
PROT SERPL-MCNC: 6.6 G/DL (ref 6–8.5)
RBC # BLD AUTO: 4.33 10*6/MM3 (ref 3.77–5.28)
SODIUM SERPL-SCNC: 143 MMOL/L (ref 136–145)
TSH SERPL DL<=0.005 MIU/L-ACNC: 1.2 UIU/ML (ref 0.27–4.2)
WBC # BLD AUTO: 6.08 10*3/MM3 (ref 3.4–10.8)

## 2024-11-11 PROCEDURE — 1125F AMNT PAIN NOTED PAIN PRSNT: CPT | Performed by: STUDENT IN AN ORGANIZED HEALTH CARE EDUCATION/TRAINING PROGRAM

## 2024-11-11 PROCEDURE — 93000 ELECTROCARDIOGRAM COMPLETE: CPT | Performed by: STUDENT IN AN ORGANIZED HEALTH CARE EDUCATION/TRAINING PROGRAM

## 2024-11-11 PROCEDURE — 99214 OFFICE O/P EST MOD 30 MIN: CPT | Performed by: STUDENT IN AN ORGANIZED HEALTH CARE EDUCATION/TRAINING PROGRAM

## 2024-11-11 RX ORDER — MULTIVIT WITH MINERALS/LUTEIN
250 TABLET ORAL DAILY
COMMUNITY

## 2024-11-11 NOTE — PROGRESS NOTES
"    Chief Complaint  Fatigue (Low heart rate)    SUBJECTIVE    History of Present Illness    Adelia Morris is a 74 y.o. female who presents to the office today as an established patient that last saw me on 8/14/2024.     Pt states that in the last weeks of September 2024 she had an upper respiratory infection with cough and sinus drainage . States she felt pretty good aside from the last week not having any energy. She is not sure if they are related. She still has an occasional dry hacking cough and she can have the sensation of her heart fluttering.  She has been checking her pulse over the last 3-4 days at home and it was 45 , another 67. Denies shortness of air. She has felt some chest pressure in the upper center but this is intermittent and seemingly after coughing episode.       Allergies   Allergen Reactions    Bactrim [Sulfamethoxazole-Trimethoprim] Rash    Clindamycin/Lincomycin Rash        Outpatient Medications Marked as Taking for the 11/11/24 encounter (Office Visit) with Christian Kovacs,    Medication Sig Dispense Refill    Calcium Carbonate-Vitamin D (CALTRATE 600+D PO) Take  by mouth.      vitamin C (ASCORBIC ACID) 250 MG tablet Take 1 tablet by mouth Daily.          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   Constitutional:       General: She is not in acute distress.     Appearance: She is obese. She is not ill-appearing.   Eyes:      General: No scleral icterus.  Cardiovascular:      Rate and Rhythm: Normal rate. Rhythm irregular.      Heart sounds: " Murmur heard.      Systolic murmur is present with a grade of 2/6.   Pulmonary:      Effort: Pulmonary effort is normal. No respiratory distress.      Breath sounds: Normal breath sounds. No wheezing.   Musculoskeletal:      Right lower leg: No edema.      Left lower leg: No edema.   Skin:     Coloration: Skin is not jaundiced.   Neurological:      Mental Status: She is alert.   Psychiatric:         Mood and Affect: Mood normal.         Behavior: Behavior normal.         Thought Content: Thought content normal.                       ECG 12 Lead    Date/Time: 11/11/2024 10:25 AM  Performed by: Christian Kovacs DO    Authorized by: Christina Kovacs DO  Comparison: compared with previous ECG from 10/24/2016  Comparison to previous ECG: Now with PVCs in a bigeminal pattern  Rhythm: sinus rhythm  Ectopy: bigeminy  Rate: normal  Rate comments: 91  Conduction: conduction normal  ST Segments: ST segments normal  T Waves: T waves normal  QRS axis: normal  Other: no other findings    Clinical impression: abnormal EKG             ASSESSMENT & PLAN     Diagnoses and all orders for this visit:    1. Fatigue, unspecified type (Primary)  2. Ventricular bigeminy  3. Palpitations  4. Sensation of chest pressure  -Patient presents to the office today for 1 week of fatigue as well as 1 to 2 months of a dry hacking cough after an upper respiratory infection that is self-reported from September 2024.  She states her heart rate at home has been 45 and 67 over the last 3 to 4 days.  She has had the sensation of fluttering as well as upper center chest discomfort.  Of note, the upper central chest discomfort is not present at this time and seems to be related to musculoskeletal etiology as it occurs after a coughing episode.  She is satting 97% on room air today and lung sounds are clear.  She has an irregular heart rhythm on auscultation.  EKG shows ventricular rate of 91 with bigeminy.I also appreciate a soft systolic murmur intermittently which  is new for her.  -At this point I will get her to an urgent cardiology appointment tomorrow.  I will obtain labs as below to screen for any reversible causes.  At any point if she has chest pressure that does not resolve after coughing or shortness of air she should report to the emergency department.  -     Comprehensive Metabolic Panel  -     CBC & Differential  -     TSH Rfx On Abnormal To Free T4  -     ECG 12 Lead  -     Magnesium  -     Phosphorus  -     Ambulatory Referral to Cardiology              Follow Up  No follow-ups on file.    Patient/family had no further questions at this time and verbalized understanding of the plan discussed today.

## 2024-11-12 ENCOUNTER — HOSPITAL ENCOUNTER (OUTPATIENT)
Dept: CARDIOLOGY | Facility: HOSPITAL | Age: 74
Discharge: HOME | End: 2024-11-12
Payer: MEDICARE

## 2024-11-12 ENCOUNTER — OFFICE VISIT (OUTPATIENT)
Dept: CARDIOLOGY | Facility: CLINIC | Age: 74
End: 2024-11-12
Payer: MEDICARE

## 2024-11-12 VITALS
WEIGHT: 195 LBS | HEIGHT: 66 IN | DIASTOLIC BLOOD PRESSURE: 86 MMHG | HEART RATE: 95 BPM | BODY MASS INDEX: 31.34 KG/M2 | OXYGEN SATURATION: 99 % | SYSTOLIC BLOOD PRESSURE: 128 MMHG

## 2024-11-12 DIAGNOSIS — R06.02 SOB (SHORTNESS OF BREATH): ICD-10-CM

## 2024-11-12 DIAGNOSIS — I49.3 FREQUENT UNIFOCAL PVCS: Primary | ICD-10-CM

## 2024-11-12 LAB
BH CV STRESS BP STAGE 1: NORMAL
BH CV STRESS BP STAGE 2: NORMAL
BH CV STRESS BP STAGE 3: NORMAL
BH CV STRESS DURATION MIN STAGE 1: 3
BH CV STRESS DURATION MIN STAGE 2: 3
BH CV STRESS DURATION MIN STAGE 3: 2
BH CV STRESS DURATION SEC STAGE 1: 0
BH CV STRESS DURATION SEC STAGE 2: 0
BH CV STRESS DURATION SEC STAGE 3: 0
BH CV STRESS GRADE STAGE 1: 0
BH CV STRESS GRADE STAGE 2: 5
BH CV STRESS GRADE STAGE 3: 10
BH CV STRESS HR STAGE 1: 112
BH CV STRESS HR STAGE 2: 119
BH CV STRESS HR STAGE 3: 125
BH CV STRESS METS STAGE 1: 2.3
BH CV STRESS METS STAGE 2: 3.5
BH CV STRESS METS STAGE 3: 4.6
BH CV STRESS PROTOCOL 1: NORMAL
BH CV STRESS RECOVERY BP: NORMAL MMHG
BH CV STRESS RECOVERY HR: 96 BPM
BH CV STRESS SPEED STAGE 1: 1.7
BH CV STRESS SPEED STAGE 2: 1.7
BH CV STRESS SPEED STAGE 3: 1.7
BH CV STRESS STAGE 1: 1
BH CV STRESS STAGE 2: 2
BH CV STRESS STAGE 3: 3
MAXIMAL PREDICTED HEART RATE: 147 BPM
PERCENT MAX PREDICTED HR: 85.03 %
STRESS BASELINE BP: NORMAL MMHG
STRESS BASELINE HR: 96 BPM
STRESS PERCENT HR: 100 %
STRESS POST ESTIMATED WORKLOAD: 4.6 METS
STRESS POST EXERCISE DUR MIN: 8 MIN
STRESS POST EXERCISE DUR SEC: 0 SEC
STRESS POST PEAK BP: NORMAL MMHG
STRESS POST PEAK HR: 125 BPM
STRESS TARGET HR: 125 BPM

## 2024-11-12 PROCEDURE — 99204 OFFICE O/P NEW MOD 45 MIN: CPT | Performed by: INTERNAL MEDICINE

## 2024-11-12 PROCEDURE — 93017 CV STRESS TEST TRACING ONLY: CPT

## 2024-11-12 PROCEDURE — 93000 ELECTROCARDIOGRAM COMPLETE: CPT | Performed by: INTERNAL MEDICINE

## 2024-11-12 NOTE — LETTER
"2024     Christian Kovacs DO  [address] [facility] [address]  [place] [id number]    Patient: Adelia Morris   YOB: 1950   Date of Visit: 2024       Dear Christian Kovacs DO,    Thank you for referring Adelia Morris to me for evaluation. Below is a copy of my consult note.    If you have questions, please do not hesitate to call me. I look forward to following Adelia along with you.         Sincerely,        Donnie Villagomez MD        CC: No Recipients    PATIENTINFORMATION    Date of Office Visit: 2024  Encounter Provider: Donnie Villagomez MD  Place of Service: [facility] CARDIOLOGY  Patient Name: Adelia Morris  : 1950    Subjective:     Encounter Date:2024      Patient ID: Adelia Morris is a 74 y.o. female.    Chief Complaint   Patient presents with   • Establish Care     Patient is in the office today to establish care for Palpitations.        HPI  Ms. Morris is a pleasant 74 years old lady who came to cardiac clinic for further evaluation treatment of PVCs.  She has been feeling weak for the past 5-6 days.  She reports intermittent \"fluttering\" lasting for few seconds but denies sustained palpitation.  Except feeling weak she denies having chest discomfort, presyncope syncope, extremity swelling.  No recent new medications.  She recovered from a viral illness in 2024.    She still exercises on a treadmill for about 10 minutes and the strength activities to legs to help with low back pain.  No prior known cardiovascular illness.      ROS  All systems reviewed and negative except as noted in HPI.    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   • Low back pain with right-sided sciatica    • Low back strain    • Menopause    • Neck strain    • Osteopenia    • UTI (urinary tract infection)        Past Surgical History: " "  Procedure Laterality Date   • BUNIONECTOMY      right   •  SECTION  1993   • COLONOSCOPY N/A 2016    Procedure: COLONOSCOPY ith polypectomy (cold bx);  Surgeon: Jarrod John Jr., MD;  Location:  [facility];  Service:    • COLONOSCOPY      WNL PER PT     • COLONOSCOPY N/A 2021    Procedure: COLONOSCOPY TO CECUM AND TI;  Surgeon: Jarrod John Jr., MD;  Location:  [facility];  Service: General;  Laterality: N/A;  pre: history of colon polyps  post: diverticulosis   • EYE SURGERY      cataracts   • LAPAROSCOPIC TUBAL LIGATION         Social History     Socioeconomic History   • Marital status:    Tobacco Use   • Smoking status: Never     Passive exposure: Never   • Smokeless tobacco: Never   Vaping Use   • Vaping status: Never Used   Substance and Sexual Activity   • Alcohol use: Never   • Drug use: Never   • Sexual activity: Yes     Partners: Male     Birth control/protection: Tubal ligation       Family History   Problem Relation Age of Onset   • Osteoporosis Mother    • Cancer Father         BONE MARROW   • Hypertension Sister    • Thyroid disease Sister    • Breast cancer Neg Hx            ECG 12 Lead    Date/Time: 2024 8:59 AM  Performed by: Donnie Villagomez MD    Authorized by: Donnie Villagomez MD  Comparison: compared with previous ECG from 2024  Similar to previous ECG  Ectopy: unifocal PVCs  Rate: normal  Conduction: conduction normal  ST Segments: ST segments normal  T Waves: T waves normal  QRS axis: normal  Other: no other findings    Clinical impression: abnormal EKG             Objective:     /86 (BP Location: Left arm, Patient Position: Sitting, Cuff Size: Adult)   Pulse 95   Ht 167.6 cm (66\")   Wt 88.5 kg (195 lb)   LMP  (LMP Unknown)   SpO2 99%   BMI 31.47 kg/m²  Body mass index is 31.47 kg/m².     Constitutional:       General: Not in acute distress.     Appearance: Well-developed. Not diaphoretic. "   Eyes:      Pupils: Pupils are equal, round, and reactive to light.   HENT:      Head: Normocephalic and atraumatic.   Neck:      Thyroid: No thyromegaly.   Pulmonary:      Effort: Pulmonary effort is normal. No respiratory distress.      Breath sounds: Normal breath sounds. No wheezing. No rales.   Chest:      Chest wall: Not tender to palpatation.   Cardiovascular:      Normal rate. Regular rhythm.      No gallop.    Pulses:     Intact distal pulses.   Edema:     Peripheral edema absent.   Abdominal:      General: Bowel sounds are normal. There is no distension.      Palpations: Abdomen is soft.      Tenderness: There is no guarding.   Musculoskeletal: Normal range of motion.         General: No deformity.      Cervical back: Normal range of motion and neck supple. Skin:     General: Skin is warm and dry.      Findings: No rash.   Neurological:      Mental Status: Alert and oriented to person, place, and time.      Cranial Nerves: No cranial nerve deficit.      Deep Tendon Reflexes: Reflexes are normal and symmetric.   Psychiatric:         Judgment: Judgment normal.         Review Of Data: I have reviewed pertinent recent labs, images and documents and pertinent findings included in HPI or assessment below.          Assessment/Plan:   Generalized body weakness/likely PVCs are contributing.  Noted to have monomorphic PVCs and bigeminy on EKG done yesterday and today.  Looks like PVCs are coming from RVOT.  Potassium and magnesium levels.  She has a normal thyroid function test.  Other than PVCs normal cardiovascular examination.  She walked for 8 minutes per modified Farhad protocol and PVCs resolved at peak exercise.  No evidence for ischemia.  She will get echocardiogram and 24-hour Holter to check for any structural heart disease, cardiomyopathy, and evaluate PVC burden.  Encouraged to continue walking daily.    Diagnosis and plan of care discussed with patient and verbalized understanding.            Your  medication list            Accurate as of November 12, 2024  2:00 PM. If you have any questions, ask your nurse or doctor.                CONTINUE taking these medications        Instructions Last Dose Given Next Dose Due   CALTRATE 600+D PO      Take  by mouth.       cetirizine 10 MG tablet  Commonly known as: zyrTEC      Take 1 tablet by mouth Daily.       OSTEO BI-FLEX JOINT SHIELD PO      Take 1 tablet by mouth 2 (Two) Times a Day.       vitamin C 250 MG tablet  Commonly known as: ASCORBIC ACID      Take 1 tablet by mouth Daily.                    Donnie Villagomez MD  11/12/24  14:00 EST

## 2024-11-17 DIAGNOSIS — I49.3 FREQUENT UNIFOCAL PVCS: Primary | ICD-10-CM

## 2024-11-17 RX ORDER — METOPROLOL SUCCINATE 25 MG/1
25 TABLET, EXTENDED RELEASE ORAL 2 TIMES DAILY
Qty: 90 TABLET | Refills: 3 | Status: SHIPPED | OUTPATIENT
Start: 2024-11-17

## 2024-11-18 ENCOUNTER — TELEPHONE (OUTPATIENT)
Dept: CARDIOLOGY | Facility: CLINIC | Age: 74
End: 2024-11-18
Payer: MEDICARE

## 2024-11-18 NOTE — TELEPHONE ENCOUNTER
Patient returned call.  We discussed Y message.  She is agreeable to start metoprolol succ 25 mg BID.  She will begin a BP/HR log, checking 1-2 times daily with morning check being a couple hours after morning meds.  She will proceed with echo as scheduled.  She will repeat holter in 1 month.  Patient verbalizes understanding and agrees with plan.  I encouraged pt to call back with any further questions/concerns.    Scheduling: Can you please call pt and get her scheduled for another holter in 1 month from now?    Thanks!    Tri Cheek RN  [facility] Triage  11/18/24 09:04 EST

## 2024-11-18 NOTE — TELEPHONE ENCOUNTER
----- Message from Donnie Villagomez sent at 11/17/2024  5:33 PM EST -----  Please notify Mrs Morris holter shows 22% PVCs but no VT which is good news. I am starting her on metoprolol succinate to help with that . Needs BP and HR check at home. Will call with results of Echo. Let me know is she has questions. Repeat Holter in one month. Order Placed. Thanks

## 2024-11-18 NOTE — TELEPHONE ENCOUNTER
I tried to call Adelia Morris but there was no answer.  Left a voicemail asking patient to call back.  Will continue to try to reach pt.    HUB- if pt calls back, please transfer through to triage.    Thank you,    Jesusita LEE RN  [place] [facility] [place]  11/18/24 08:52 EST

## 2024-11-26 ENCOUNTER — HOSPITAL ENCOUNTER (OUTPATIENT)
Dept: CARDIOLOGY | Facility: HOSPITAL | Age: 74
Discharge: HOME | End: 2024-11-26
Admitting: INTERNAL MEDICINE
Payer: MEDICARE

## 2024-11-26 VITALS
BODY MASS INDEX: 31.34 KG/M2 | WEIGHT: 195 LBS | SYSTOLIC BLOOD PRESSURE: 155 MMHG | DIASTOLIC BLOOD PRESSURE: 80 MMHG | HEART RATE: 57 BPM | HEIGHT: 66 IN

## 2024-11-26 DIAGNOSIS — I49.3 FREQUENT UNIFOCAL PVCS: ICD-10-CM

## 2024-11-26 LAB
AORTIC ARCH: 2.6 CM
AORTIC DIMENSIONLESS INDEX: 0.72 (DI)
ASCENDING AORTA: 2.5 CM
BH CV ECHO MEAS - ACS: 1.68 CM
BH CV ECHO MEAS - AO MAX PG: 5.6 MMHG
BH CV ECHO MEAS - AO MEAN PG: 3.5 MMHG
BH CV ECHO MEAS - AO ROOT DIAM: 2.16 CM
BH CV ECHO MEAS - AO V2 MAX: 118.7 CM/SEC
BH CV ECHO MEAS - AO V2 VTI: 30 CM
BH CV ECHO MEAS - AVA(I,D): 2.36 CM2
BH CV ECHO MEAS - EDV(CUBED): 124.2 ML
BH CV ECHO MEAS - EDV(MOD-SP2): 133 ML
BH CV ECHO MEAS - EDV(MOD-SP4): 139 ML
BH CV ECHO MEAS - EF(MOD-BP): 61.3 %
BH CV ECHO MEAS - EF(MOD-SP2): 62.4 %
BH CV ECHO MEAS - EF(MOD-SP4): 62.6 %
BH CV ECHO MEAS - ESV(CUBED): 17.9 ML
BH CV ECHO MEAS - ESV(MOD-SP2): 50 ML
BH CV ECHO MEAS - ESV(MOD-SP4): 52 ML
BH CV ECHO MEAS - FS: 47.5 %
BH CV ECHO MEAS - IVS/LVPW: 0.98 CM
BH CV ECHO MEAS - IVSD: 0.7 CM
BH CV ECHO MEAS - LAT PEAK E' VEL: 8.6 CM/SEC
BH CV ECHO MEAS - LV DIASTOLIC VOL/BSA (35-75): 70.2 CM2
BH CV ECHO MEAS - LV MASS(C)D: 115 GRAMS
BH CV ECHO MEAS - LV MAX PG: 3.5 MMHG
BH CV ECHO MEAS - LV MEAN PG: 1.76 MMHG
BH CV ECHO MEAS - LV SYSTOLIC VOL/BSA (12-30): 26.3 CM2
BH CV ECHO MEAS - LV V1 MAX: 93.5 CM/SEC
BH CV ECHO MEAS - LV V1 VTI: 22.3 CM
BH CV ECHO MEAS - LVIDD: 5 CM
BH CV ECHO MEAS - LVIDS: 2.6 CM
BH CV ECHO MEAS - LVOT AREA: 3.2 CM2
BH CV ECHO MEAS - LVOT DIAM: 2.01 CM
BH CV ECHO MEAS - LVPWD: 0.71 CM
BH CV ECHO MEAS - MED PEAK E' VEL: 9.8 CM/SEC
BH CV ECHO MEAS - MR MAX PG: 62.2 MMHG
BH CV ECHO MEAS - MR MAX VEL: 394.4 CM/SEC
BH CV ECHO MEAS - MV A DUR: 0.15 SEC
BH CV ECHO MEAS - MV A MAX VEL: 73.7 CM/SEC
BH CV ECHO MEAS - MV DEC SLOPE: 589.2 CM/SEC2
BH CV ECHO MEAS - MV DEC TIME: 0.15 SEC
BH CV ECHO MEAS - MV E MAX VEL: 81 CM/SEC
BH CV ECHO MEAS - MV E/A: 1.1
BH CV ECHO MEAS - MV MAX PG: 3.7 MMHG
BH CV ECHO MEAS - MV MEAN PG: 1.56 MMHG
BH CV ECHO MEAS - MV P1/2T: 47 MSEC
BH CV ECHO MEAS - MV V2 VTI: 20.9 CM
BH CV ECHO MEAS - MVA(P1/2T): 4.7 CM2
BH CV ECHO MEAS - MVA(VTI): 3.4 CM2
BH CV ECHO MEAS - PA ACC TIME: 0.13 SEC
BH CV ECHO MEAS - PA V2 MAX: 83 CM/SEC
BH CV ECHO MEAS - PULM A REVS DUR: 0.11 SEC
BH CV ECHO MEAS - PULM A REVS VEL: 30.5 CM/SEC
BH CV ECHO MEAS - PULM DIAS VEL: 56.8 CM/SEC
BH CV ECHO MEAS - PULM S/D: 1.88
BH CV ECHO MEAS - PULM SYS VEL: 106.6 CM/SEC
BH CV ECHO MEAS - QP/QS: 0.89
BH CV ECHO MEAS - RAP SYSTOLE: 3 MMHG
BH CV ECHO MEAS - RV MAX PG: 1.52 MMHG
BH CV ECHO MEAS - RV V1 MAX: 61.7 CM/SEC
BH CV ECHO MEAS - RV V1 VTI: 15.5 CM
BH CV ECHO MEAS - RVOT DIAM: 2.27 CM
BH CV ECHO MEAS - RVSP: 33 MMHG
BH CV ECHO MEAS - SUP REN AO DIAM: 1.6 CM
BH CV ECHO MEAS - SV(LVOT): 70.8 ML
BH CV ECHO MEAS - SV(MOD-SP2): 83 ML
BH CV ECHO MEAS - SV(MOD-SP4): 87 ML
BH CV ECHO MEAS - SV(RVOT): 62.9 ML
BH CV ECHO MEAS - SVI(LVOT): 35.8 ML/M2
BH CV ECHO MEAS - SVI(MOD-SP2): 41.9 ML/M2
BH CV ECHO MEAS - SVI(MOD-SP4): 44 ML/M2
BH CV ECHO MEAS - TAPSE (>1.6): 3.2 CM
BH CV ECHO MEAS - TR MAX PG: 29.8 MMHG
BH CV ECHO MEAS - TR MAX VEL: 272.8 CM/SEC
BH CV ECHO MEASUREMENTS AVERAGE E/E' RATIO: 8.8
BH CV XLRA - RV BASE: 3.3 CM
BH CV XLRA - RV LENGTH: 6.5 CM
BH CV XLRA - RV MID: 2.6 CM
BH CV XLRA - TDI S': 20.4 CM/SEC
LEFT ATRIUM VOLUME INDEX: 29.2 ML/M2
SINUS: 2.8 CM
STJ: 2.01 CM

## 2024-11-26 PROCEDURE — 25510000001 PERFLUTREN 6.52 MG/ML SUSPENSION 2 ML VIAL: Performed by: INTERNAL MEDICINE

## 2024-11-26 PROCEDURE — 93306 TTE W/DOPPLER COMPLETE: CPT

## 2024-11-26 RX ADMIN — PERFLUTREN 2 ML: 6.52 INJECTION, SUSPENSION INTRAVENOUS at 14:06

## 2024-11-27 RX ORDER — METOPROLOL SUCCINATE 25 MG/1
50 TABLET, EXTENDED RELEASE ORAL 2 TIMES DAILY
Start: 2024-11-27

## 2024-11-27 NOTE — PROGRESS NOTES
I have called and discussed results with Ms. Morris.  Blood pressure slightly on the higher side during home monitoring.  I have increased metoprolol to 50 mg p.o. twice daily pending repeat Holter in December.  She has moderate eccentric mitral valve regurgitation and I suspect PVCs may be contributing.  She has normal ventricular ejection fraction.  May add amiodarone temporarily with persistent PVCs.

## 2024-12-10 RX ORDER — METOPROLOL SUCCINATE 50 MG/1
50 TABLET, EXTENDED RELEASE ORAL 2 TIMES DAILY
Qty: 60 TABLET | Refills: 2 | Status: SHIPPED | OUTPATIENT
Start: 2024-12-10

## 2024-12-18 ENCOUNTER — TELEPHONE (OUTPATIENT)
Dept: INTERNAL MEDICINE | Facility: CLINIC | Age: 74
End: 2024-12-18

## 2024-12-18 NOTE — TELEPHONE ENCOUNTER
"  Caller: Adelia Morris \"[email or web address]\"    Relationship: Self    Best call back number: [phone]     What medication are you requesting: DIFLUCAN    What are your current symptoms: YEAST INFECTION     How long have you been experiencing symptoms: 1 WEEK     Have you had these symptoms before:    [x] Yes  [] No    Have you been treated for these symptoms before:   [x] Yes  [] No    If a prescription is needed, what is your preferred pharmacy and phone number: [facility] [id number] - [facility] [address] AT [facility] [address] [facility] [phone] [facility] [phone]      Additional notes: TRIED OTC MEDICATION NOT GETTING BETTER           "

## 2024-12-19 RX ORDER — FLUCONAZOLE 150 MG/1
150 TABLET ORAL ONCE
Qty: 1 TABLET | Refills: 0 | Status: SHIPPED | OUTPATIENT
Start: 2024-12-19 | End: 2024-12-19

## 2024-12-20 ENCOUNTER — TELEPHONE (OUTPATIENT)
Dept: CARDIOLOGY | Facility: CLINIC | Age: 74
End: 2024-12-20
Payer: MEDICARE

## 2024-12-20 DIAGNOSIS — I49.3 FREQUENT PVCS: Primary | ICD-10-CM

## 2024-12-20 RX ORDER — RANOLAZINE 500 MG/1
500 TABLET, EXTENDED RELEASE ORAL 2 TIMES DAILY
Qty: 120 TABLET | Refills: 3 | Status: SHIPPED | OUTPATIENT
Start: 2024-12-20

## 2024-12-20 NOTE — TELEPHONE ENCOUNTER
"I spoke with Adelia Morris and updated pt on results/recommendations from provider.  Pt verbalized understanding and has no further questions at this time.  She's agreeable to new medication and repeat holter.    \"Some days I do pretty well and some days I feel like I have no energy. I just feel kind of wiped out and sometimes it feels like it's hard to take a deep breath, like right under my  throat area\".  BP is roughly 120s/60-50s.  Her HR will drop to 33, 38, 32, 60, 61, 52, 53, 57.    I scheduled her FU with you on 1/22.    Scheduling,  Please set up monitor in 3 weeks.  Pt has been scheduled for a FU appt on 1/22 and MD would like results back by this time.    Thank you,    Jesusita LEE RN  Triage [facility]  12/20/24 10:15 EST    "

## 2024-12-20 NOTE — TELEPHONE ENCOUNTER
----- Message from Donnie Villagomez sent at 12/20/2024  8:16 AM EST -----  Please notify Ms. Morris that I have reviewed Holter from this week.  Still she has a lot of extra beats.  Can you ask how she has been doing?  I will add the medicine that I already called in and see if that helps.  Repeat Holter in 3 weeks.  With persistent PVCs we may consider doing a ablation.  Let me know if she has any questions for me.  I will copy Nancy to schedule Holter for 3 weeks and see me in 1 month

## 2025-01-22 ENCOUNTER — OFFICE VISIT (OUTPATIENT)
Dept: CARDIOLOGY | Facility: CLINIC | Age: 75
End: 2025-01-22
Payer: MEDICARE

## 2025-01-22 VITALS
DIASTOLIC BLOOD PRESSURE: 72 MMHG | SYSTOLIC BLOOD PRESSURE: 118 MMHG | BODY MASS INDEX: 30.67 KG/M2 | HEIGHT: 66 IN | WEIGHT: 190.8 LBS | OXYGEN SATURATION: 97 % | HEART RATE: 60 BPM

## 2025-01-22 DIAGNOSIS — I34.0 MODERATE MITRAL VALVE REGURGITATION: Primary | ICD-10-CM

## 2025-01-22 DIAGNOSIS — I49.3 FREQUENT UNIFOCAL PVCS: ICD-10-CM

## 2025-01-22 DIAGNOSIS — I10 ESSENTIAL HYPERTENSION: ICD-10-CM

## 2025-01-22 RX ORDER — METOPROLOL SUCCINATE 25 MG/1
25 TABLET, EXTENDED RELEASE ORAL 2 TIMES DAILY
Qty: 180 TABLET | Refills: 3 | Status: SHIPPED | OUTPATIENT
Start: 2025-01-22

## 2025-01-22 NOTE — PROGRESS NOTES
PATIENTINFORMATION    Date of Office Visit: 2025  Encounter Provider: Donnie Villagomez MD  Place of Service: [facility]  Patient Name: Adelia Morris  : 1950    Subjective:     Encounter Date:2025      Patient ID: Adelia Morris is a 74 y.o. female.    Chief Complaint   Patient presents with    Frequent unifocal PVCs       HPI  Ms. Morris is a pleasant 73 yo lady who is here for fu visit.  She reports significantly improved generalized body weakness and shortness of breath with control of PVCs.  She is fairly active and does intermittent exercise on a treadmill without chest pain or significant shortness of breath.  She is more concerned about constipation that started after she was put on ranolazine.  No significant other complaints.  Blood pressure runs normal during home monitoring.      ROS  All systems reviewed and negative except as noted in HPI.    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    Low back pain with right-sided sciatica     Low back strain     Menopause     Neck strain     Osteopenia     UTI (urinary tract infection)        Past Surgical History:   Procedure Laterality Date    BUNIONECTOMY      right     SECTION  1993    COLONOSCOPY N/A 2016    Procedure: COLONOSCOPY ith polypectomy (cold bx);  Surgeon: Jarrod John Jr., MD;  Location: [facility] ENDOSCOPY;  Service:     COLONOSCOPY      WNL PER PT      COLONOSCOPY N/A 2021    Procedure: COLONOSCOPY TO CECUM AND TI;  Surgeon: Jarrod John Jr., MD;  Location: [facility] ENDOSCOPY;  Service: General;  Laterality: N/A;  pre: history of colon polyps  post: diverticulosis    EYE SURGERY      cataracts    LAPAROSCOPIC TUBAL LIGATION         Social History     Socioeconomic History    Marital status:    Tobacco Use    Smoking status: Never     Passive  "exposure: Never    Smokeless tobacco: Never   Vaping Use    Vaping status: Never Used   Substance and Sexual Activity    Alcohol use: Never    Drug use: Never    Sexual activity: Yes     Partners: Male     Birth control/protection: Tubal ligation       Family History   Problem Relation Age of Onset    Osteoporosis Mother     Cancer Father         BONE MARROW    Hypertension Sister     Thyroid disease Sister     Breast cancer Neg Hx            ECG 12 Lead    Date/Time: 1/22/2025 9:21 AM  Performed by: Donnie Villagomez MD    Authorized by: Donnie Villagomez MD  Comparison: compared with previous ECG from 11/12/2024  Comparison to previous ECG: PVCs resolved on this tracing  Rhythm: sinus rhythm  Rate: normal  Conduction: conduction normal  ST Segments: ST segments normal  T Waves: T waves normal  QRS axis: normal  Other: no other findings    Clinical impression: normal ECG           Objective:     /72 (BP Location: Right arm, Patient Position: Sitting, Cuff Size: Adult)   Pulse 60   Ht 167.6 cm (66\")   Wt 86.5 kg (190 lb 12.8 oz)   LMP  (LMP Unknown)   SpO2 97%   BMI 30.80 kg/m²  Body mass index is 30.8 kg/m².     Constitutional:       General: Not in acute distress.     Appearance: Well-developed. Not diaphoretic.   Eyes:      Pupils: Pupils are equal, round, and reactive to light.   HENT:      Head: Normocephalic and atraumatic.   Neck:      Thyroid: No thyromegaly.   Pulmonary:      Effort: Pulmonary effort is normal. No respiratory distress.      Breath sounds: Normal breath sounds. No wheezing. No rales.   Chest:      Chest wall: Not tender to palpatation.   Cardiovascular:      Normal rate. Regular rhythm.      No gallop.    Pulses:     Intact distal pulses.   Edema:     Peripheral edema absent.   Abdominal:      General: Bowel sounds are normal. There is no distension.      Palpations: Abdomen is soft.      Tenderness: There is no guarding.   Musculoskeletal: Normal range of motion.         " General: No deformity.      Cervical back: Normal range of motion and neck supple. Skin:     General: Skin is warm and dry.      Findings: No rash.   Neurological:      Mental Status: Alert and oriented to person, place, and time.      Cranial Nerves: No cranial nerve deficit.      Deep Tendon Reflexes: Reflexes are normal and symmetric.   Psychiatric:         Judgment: Judgment normal.       Review Of Data: I have reviewed pertinent recent labs, images and documents and pertinent findings included in HPI or assessment below.          Assessment/Plan:   Generalized body weakness/shortness of breath-likely from symptomatic PVCs-she had 22% PVC burden on Holter.  Repeat Holter on metoprolol in December 2024 showed PVC burden of 39% with singlets, couplets and few triplets.  PVCs improved with walking on treadmill.  Normal left ventricular ejection fraction.  She was started on ranolazine and repeat Holter in January 2025 showsed rare PVCs.  Moderate MR with posteriorly directed jet-PVCs likely contributing.  Mild TR present.  Essential hypertension  Constipation likely from Rhinolast and    Doing fairly well from cardiac standpoint.  Vital signs within range.  Normal PVCs on EKG.  No cardiac murmur.  Repeat echo to follow-up on moderate mitral valve regurgitation.  Treat constipation symptomatically for now if persistent will decrease dose of regularly.  Going forward I will try to wean her off metoprolol/spironolactone.  Follow-up in cardiac clinic in 1 year or sooner with concerns.    Diagnosis and plan of care discussed with patient and verbalized understanding.            Your medication list            Accurate as of January 22, 2025  9:21 AM. If you have any questions, ask your nurse or doctor.                CHANGE how you take these medications        Instructions Last Dose Given Next Dose Due   metoprolol succinate XL 25 MG 24 hr tablet  Commonly known as: TOPROL-XL  What changed:   medication strength  how  much to take  Changed by: Donnie Villagomez      Take 1 tablet by mouth 2 (Two) Times a Day.              CONTINUE taking these medications        Instructions Last Dose Given Next Dose Due   CALTRATE 600+D PO      Take  by mouth.       cetirizine 10 MG tablet  Commonly known as: zyrTEC      Take 1 tablet by mouth Daily.       ranolazine 500 MG 12 hr tablet  Commonly known as: RANEXA      Take 1 tablet by mouth 2 (Two) Times a Day.       vitamin C 250 MG tablet  Commonly known as: ASCORBIC ACID      Take 1 tablet by mouth Daily.              STOP taking these medications      OSTEO BI-FLEX JOINT SHIELD PO  Stopped by: Donnie Villagomez                  Where to Get Your Medications        These medications were sent to [facility] [id number] - [place], [place] - [address] [facility] [phone] [facility] [phone]   [address], [facility] [address]      Phone: [phone]   metoprolol succinate XL 25 MG 24 hr tablet             Donnie Villagomez MD  01/22/25  09:21 EST

## 2025-02-04 ENCOUNTER — TRANSCRIBE ORDERS (OUTPATIENT)
Dept: ADMINISTRATIVE | Facility: HOSPITAL | Age: 75
End: 2025-02-04
Payer: MEDICARE

## 2025-02-04 DIAGNOSIS — Z12.31 BREAST CANCER SCREENING BY MAMMOGRAM: Primary | ICD-10-CM

## 2025-03-27 ENCOUNTER — HOSPITAL ENCOUNTER (OUTPATIENT)
Dept: MAMMOGRAPHY | Facility: HOSPITAL | Age: 75
Discharge: HOME | End: 2025-03-27
Admitting: STUDENT IN AN ORGANIZED HEALTH CARE EDUCATION/TRAINING PROGRAM
Payer: MEDICARE

## 2025-03-27 DIAGNOSIS — Z12.31 BREAST CANCER SCREENING BY MAMMOGRAM: ICD-10-CM

## 2025-03-27 PROCEDURE — 77067 SCR MAMMO BI INCL CAD: CPT

## 2025-03-27 PROCEDURE — 77063 BREAST TOMOSYNTHESIS BI: CPT

## 2025-04-01 ENCOUNTER — HOSPITAL ENCOUNTER (OUTPATIENT)
Dept: CARDIOLOGY | Facility: HOSPITAL | Age: 75
Discharge: HOME | End: 2025-04-01
Admitting: INTERNAL MEDICINE
Payer: MEDICARE

## 2025-04-01 VITALS
BODY MASS INDEX: 30.53 KG/M2 | SYSTOLIC BLOOD PRESSURE: 118 MMHG | WEIGHT: 190 LBS | HEIGHT: 66 IN | DIASTOLIC BLOOD PRESSURE: 70 MMHG

## 2025-04-01 DIAGNOSIS — I34.0 MODERATE MITRAL VALVE REGURGITATION: ICD-10-CM

## 2025-04-01 LAB
AORTIC ARCH: 2.1 CM
AORTIC DIMENSIONLESS INDEX: 0.79 (DI)
ASCENDING AORTA: 2.8 CM
AV MEAN PRESS GRAD SYS DOP V1V2: 3.4 MMHG
AV VMAX SYS DOP: 130.3 CM/SEC
BH CV ECHO MEAS - ACS: 1.85 CM
BH CV ECHO MEAS - AO MAX PG: 6.8 MMHG
BH CV ECHO MEAS - AO ROOT AREA (BSA CORRECTED): 1.4 CM2
BH CV ECHO MEAS - AO ROOT DIAM: 2.7 CM
BH CV ECHO MEAS - AO V2 VTI: 32.1 CM
BH CV ECHO MEAS - AVA(I,D): 2.28 CM2
BH CV ECHO MEAS - EDV(CUBED): 124.8 ML
BH CV ECHO MEAS - EDV(MOD-SP2): 129 ML
BH CV ECHO MEAS - EDV(MOD-SP4): 131 ML
BH CV ECHO MEAS - EF(MOD-SP2): 67.4 %
BH CV ECHO MEAS - EF(MOD-SP4): 64.9 %
BH CV ECHO MEAS - ESV(CUBED): 33.1 ML
BH CV ECHO MEAS - ESV(MOD-SP2): 42 ML
BH CV ECHO MEAS - ESV(MOD-SP4): 46 ML
BH CV ECHO MEAS - FS: 35.8 %
BH CV ECHO MEAS - IVS/LVPW: 1.06 CM
BH CV ECHO MEAS - IVSD: 0.69 CM
BH CV ECHO MEAS - LV DIASTOLIC VOL/BSA (35-75): 66.9 CM2
BH CV ECHO MEAS - LV MASS(C)D: 108.5 GRAMS
BH CV ECHO MEAS - LV MAX PG: 3.5 MMHG
BH CV ECHO MEAS - LV MEAN PG: 1.99 MMHG
BH CV ECHO MEAS - LV SYSTOLIC VOL/BSA (12-30): 23.5 CM2
BH CV ECHO MEAS - LV V1 MAX: 94.1 CM/SEC
BH CV ECHO MEAS - LV V1 VTI: 25.3 CM
BH CV ECHO MEAS - LVIDD: 5 CM
BH CV ECHO MEAS - LVIDS: 3.2 CM
BH CV ECHO MEAS - LVOT AREA: 2.9 CM2
BH CV ECHO MEAS - LVOT DIAM: 1.92 CM
BH CV ECHO MEAS - LVPWD: 0.65 CM
BH CV ECHO MEAS - MV A DUR: 0.14 SEC
BH CV ECHO MEAS - MV A MAX VEL: 67.3 CM/SEC
BH CV ECHO MEAS - MV DEC SLOPE: 675.2 CM/SEC2
BH CV ECHO MEAS - MV DEC TIME: 0.17 SEC
BH CV ECHO MEAS - MV E MAX VEL: 82 CM/SEC
BH CV ECHO MEAS - MV E/A: 1.22
BH CV ECHO MEAS - MV MAX PG: 3.7 MMHG
BH CV ECHO MEAS - MV MEAN PG: 1.43 MMHG
BH CV ECHO MEAS - MV P1/2T: 44.8 MSEC
BH CV ECHO MEAS - MV V2 VTI: 18.7 CM
BH CV ECHO MEAS - MVA(P1/2T): 4.9 CM2
BH CV ECHO MEAS - MVA(VTI): 3.9 CM2
BH CV ECHO MEAS - PA ACC TIME: 0.22 SEC
BH CV ECHO MEAS - PA V2 MAX: 74.7 CM/SEC
BH CV ECHO MEAS - PULM A REVS DUR: 0.14 SEC
BH CV ECHO MEAS - PULM A REVS VEL: 37.1 CM/SEC
BH CV ECHO MEAS - PULM DIAS VEL: 59.3 CM/SEC
BH CV ECHO MEAS - PULM S/D: 1.18
BH CV ECHO MEAS - PULM SYS VEL: 70.2 CM/SEC
BH CV ECHO MEAS - RAP SYSTOLE: 3 MMHG
BH CV ECHO MEAS - RV MAX PG: 0.94 MMHG
BH CV ECHO MEAS - RV V1 MAX: 48.4 CM/SEC
BH CV ECHO MEAS - RV V1 VTI: 15 CM
BH CV ECHO MEAS - RVSP: 31 MMHG
BH CV ECHO MEAS - SUP REN AO DIAM: 1.6 CM
BH CV ECHO MEAS - SV(LVOT): 73.3 ML
BH CV ECHO MEAS - SV(MOD-SP2): 87 ML
BH CV ECHO MEAS - SV(MOD-SP4): 85 ML
BH CV ECHO MEAS - SVI(LVOT): 37.5 ML/M2
BH CV ECHO MEAS - SVI(MOD-SP2): 44.5 ML/M2
BH CV ECHO MEAS - SVI(MOD-SP4): 43.4 ML/M2
BH CV ECHO MEAS - TAPSE (>1.6): 2.9 CM
BH CV ECHO MEAS - TR MAX PG: 28.1 MMHG
BH CV ECHO MEAS - TR MAX VEL: 265 CM/SEC
BH CV XLRA - RV BASE: 3.4 CM
BH CV XLRA - RV LENGTH: 7.6 CM
BH CV XLRA - RV MID: 1.98 CM
BH CV XLRA - TDI S': 13.8 CM/SEC
LEFT ATRIUM VOLUME INDEX: 34.5 ML/M2
LV EF BIPLANE MOD: 65.9 %
SINUS: 2.8 CM
STJ: 2.8 CM

## 2025-04-01 PROCEDURE — 93306 TTE W/DOPPLER COMPLETE: CPT | Performed by: INTERNAL MEDICINE

## 2025-04-01 PROCEDURE — 93306 TTE W/DOPPLER COMPLETE: CPT

## 2025-04-01 PROCEDURE — 25510000001 PERFLUTREN 6.52 MG/ML SUSPENSION 2 ML VIAL: Performed by: INTERNAL MEDICINE

## 2025-04-01 RX ADMIN — PERFLUTREN 2 ML: 6.52 INJECTION, SUSPENSION INTRAVENOUS at 09:35

## 2025-04-17 ENCOUNTER — OFFICE VISIT (OUTPATIENT)
Dept: INTERNAL MEDICINE | Facility: CLINIC | Age: 75
End: 2025-04-17
Payer: MEDICARE

## 2025-04-17 VITALS
DIASTOLIC BLOOD PRESSURE: 79 MMHG | BODY MASS INDEX: 29.57 KG/M2 | SYSTOLIC BLOOD PRESSURE: 131 MMHG | HEIGHT: 66 IN | WEIGHT: 184 LBS | OXYGEN SATURATION: 96 % | HEART RATE: 50 BPM

## 2025-04-17 DIAGNOSIS — M25.552 LEFT HIP PAIN: Primary | ICD-10-CM

## 2025-04-17 DIAGNOSIS — R05.1 ACUTE COUGH: ICD-10-CM

## 2025-04-17 PROCEDURE — 3078F DIAST BP <80 MM HG: CPT | Performed by: STUDENT IN AN ORGANIZED HEALTH CARE EDUCATION/TRAINING PROGRAM

## 2025-04-17 PROCEDURE — 1125F AMNT PAIN NOTED PAIN PRSNT: CPT | Performed by: STUDENT IN AN ORGANIZED HEALTH CARE EDUCATION/TRAINING PROGRAM

## 2025-04-17 PROCEDURE — 99214 OFFICE O/P EST MOD 30 MIN: CPT | Performed by: STUDENT IN AN ORGANIZED HEALTH CARE EDUCATION/TRAINING PROGRAM

## 2025-04-17 PROCEDURE — 3075F SYST BP GE 130 - 139MM HG: CPT | Performed by: STUDENT IN AN ORGANIZED HEALTH CARE EDUCATION/TRAINING PROGRAM

## 2025-04-17 NOTE — PROGRESS NOTES
"  Lucas Church M.D.  Internal Medicine  [facility]  [address], [address]  [place] [address]  [phone]      Chief Complaint  Cough (Patient thinks she still has a cough from Covid in September 2024/), Hip Pain (Pain from left hip all the way down to knee ), and Neck Pain (Neck feels numb and tingling in neck down into shoulders /)    SUBJECTIVE    History of Present Illness    Adelia Morris is a 74 y.o. female with past medical history significant for mitral valve regurgitation, hypertension, osteopenia, low back pain with right sided sciatica who presents to the office today as an established patient of Dr. Kovacs here for an acute visit.    History of Present Illness  The patient came in today because of pain in her left hip and a persistent cough.    She mentioned that her left hip has been sore for about a week, which she thinks might be due to the exercises she was given in physical therapy and using the treadmill. The pain goes from the back of her leg to her knee and sometimes down to her ankle. She has trouble going up and down stairs and putting weight on her leg, which makes her shoulders and neck hurt too. She also finds it difficult to cross her knee when putting on socks. She recalled a fall a year ago where she landed hard on her knee but isn't sure if that's related to her current pain. She had physical therapy in September which helped a lot, but then she had an arrhythmia in November that \"wiped her out\". Cardiology said the arrhythmia is gone, but she still has some \"backflow\" in her mitral valve, which she thinks might be contributing to her symptoms. She wants to go back to formal physical therapy because she feels like she's lost the strength she gained. Advil and muscle relaxers haven't helped her, and she found physical therapy more beneficial than the injections she received from ortho.    She also reported a persistent cough since September, which her " daughter thinks might be due to long COVID. She experiences postnasal drainage that gets worse when she lies down, and she uses lozenges to coat her throat so she can sleep. She has trouble breathing through her nose and sleeps with her mouth open more. She hasn't had any lung x-rays done. She hasn't tried Flonase or antihistamines because she's worried about interactions with her heart medications. She doesn't have wheezing, fevers, chills, shortness of breath, or heartburn. Her cough is somewhat productive with clear phlegm, but not green or yellow. She stopped taking Zyrtec because she was concerned it was making her heart rate go up, and she can't take Claritin-D because it makes her heart race.    Review of Systems    Allergies   Allergen Reactions    Bactrim [Sulfamethoxazole-Trimethoprim] Rash    Clindamycin/Lincomycin Rash        Outpatient Medications Marked as Taking for the 25 encounter (Office Visit) with Lucas Church MD   Medication Sig Dispense Refill    cetirizine (zyrTEC) 10 MG tablet Take 1 tablet by mouth Daily.      metoprolol succinate XL (TOPROL-XL) 25 MG 24 hr tablet Take 1 tablet by mouth 2 (Two) Times a Day. 180 tablet 3    ranolazine (RANEXA) 500 MG 12 hr tablet Take 1 tablet by mouth 2 (Two) Times a Day. 120 tablet 3    vitamin C (ASCORBIC ACID) 250 MG tablet Take 1 tablet by mouth Daily.          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    Low back pain with right-sided sciatica     Low back strain     Menopause     Neck strain     Osteopenia     UTI (urinary tract infection)      Past Surgical History:   Procedure Laterality Date    BUNIONECTOMY      right     SECTION  1993    COLONOSCOPY N/A 2016    Procedure: COLONOSCOPY ith polypectomy (cold bx);  Surgeon: Jarrod John Jr., MD;  Location: [facility] ENDOSCOPY;  Service:     COLONOSCOPY      WNL PER PT   "    COLONOSCOPY N/A 12/02/2021    Procedure: COLONOSCOPY TO CECUM AND TI;  Surgeon: Jarrod John Jr., MD;  Location: [facility] ENDOSCOPY;  Service: General;  Laterality: N/A;  pre: history of colon polyps  post: diverticulosis    EYE SURGERY      cataracts    LAPAROSCOPIC TUBAL LIGATION       Family History   Problem Relation Age of Onset    Osteoporosis Mother     Cancer Father         BONE MARROW    Hypertension Sister     Thyroid disease Sister     Breast cancer Neg Hx     reports that she has never smoked. She has never been exposed to tobacco smoke. She has never used smokeless tobacco. She reports that she does not drink alcohol and does not use drugs.    OBJECTIVE    Vital Signs:   /79   Pulse 50   Ht 167.6 cm (65.98\")   Wt 83.5 kg (184 lb)   SpO2 96%   BMI 29.71 kg/m²     Physical Exam  Constitutional:       General: She is not in acute distress.     Appearance: Normal appearance.   HENT:      Mouth/Throat:      Pharynx: Posterior oropharyngeal erythema present.   Pulmonary:      Effort: Pulmonary effort is normal. No respiratory distress.   Musculoskeletal:        Back:       Right hip: No bony tenderness. Normal range of motion.      Left hip: Bony tenderness present. Decreased range of motion.   Neurological:      Mental Status: She is alert. Mental status is at baseline.   Psychiatric:         Mood and Affect: Mood normal.         Behavior: Behavior normal.         Thought Content: Thought content normal.          Physical Exam      The following data was reviewed by: Lucas Church MD on 04/17/2025:  CMP          6/28/2024    11:13 8/14/2024    10:04 11/11/2024    10:48   CMP   Glucose  92  97    BUN  10  10    Creatinine 0.70  0.76  0.82    EGFR  82.9  75.6    Sodium  143  143    Potassium  4.3  4.2    Chloride  107  107    Calcium  9.5  9.4    Total Protein  6.9  6.6    Albumin  4.4  4.2    Globulin  2.5  2.4    Total Bilirubin  0.5  0.4    Alkaline Phosphatase  70  67    AST " (SGOT)  11  13    ALT (SGPT)  12  11    Albumin/Globulin Ratio  1.8  1.8    BUN/Creatinine Ratio  13.2  12.2      CBC w/diff          8/14/2024    10:04 11/11/2024    10:48   CBC w/Diff   WBC 5.57  6.08    RBC 4.42  4.33    Hemoglobin 13.5  13.4    Hematocrit 40.8  39.5    MCV 92.3  91.2    MCH 30.5  30.9    MCHC 33.1  33.9    RDW 12.8  12.3    Platelets 283  260    Neutrophil Rel % 60.8  64.0    Lymphocyte Rel % 27.3  25.0    Monocyte Rel % 8.8  8.9    Eosinophil Rel % 1.6  1.0    Basophil Rel % 1.3  0.8        TSH          11/11/2024    10:48   TSH   TSH 1.200            XR Spine Lumbar Flex & Ext (06/28/2024 12:10)   MRI Lumbar Spine With & Without Contrast (06/28/2024 11:49)     ASSESSMENT & PLAN        Left hip pain  -1 week hip was sore. Doing home PT exercises. Feels tense and tight lateral thigh, behind knee and ankle.   -Trauma to knee a year ago  - Tenderness in gluteal region and hamstring tightness noted  - Referral for physical therapy initiated  Orders:    Ambulatory Referral to Physical Therapy for Evaluation & Treatment    Acute cough  Since September. Cough and drainage. Intermittent. Worse if she lays down. Uses lozenge. Thinks it could be allergies. Takes Zyrtec but thought is gave her palpitations. No shortness of breath. Shest feels tight. Cought is productives. No fevers/chills/shortness of breath. Cough is shallow and hacking. No heart burn.  - Likely due to postnasal drainage and congestion  - Lung exam clear; today's x-ray showed no abnormalities. Radiology read pending  - Advised over-the-counter Flonase, antihistamines, and nasal saline rinses  - Cautioned against decongestants due to potential blood pressure elevation and palpitations  - Follow-up with Dr. Kovacs if no improvement   Orders:    XR Chest PA & Lateral      Assessment & Plan          Health Maintenance Due   Topic Date Due    COVID-19 Vaccine (3 - 2024-25 season) 09/01/2024    DXA SCAN  10/06/2024        Follow Up  No  follow-ups on file.    Patient/family had no further questions at this time and verbalized understanding of the plan discussed today.     Patient or patient representative verbalized consent for the use of Ambient Listening during the visit with  Lucas Church MD for chart documentation. 4/17/2025  16:11 EDT

## 2025-04-24 ENCOUNTER — TREATMENT (OUTPATIENT)
Dept: PHYSICAL THERAPY | Facility: CLINIC | Age: 75
End: 2025-04-24
Payer: MEDICARE

## 2025-04-24 DIAGNOSIS — R26.89 FUNCTIONAL GAIT ABNORMALITY: ICD-10-CM

## 2025-04-24 DIAGNOSIS — G89.29 CHRONIC LEFT HIP PAIN: Primary | ICD-10-CM

## 2025-04-24 DIAGNOSIS — M25.652 DECREASED RANGE OF MOTION OF BOTH HIPS: ICD-10-CM

## 2025-04-24 DIAGNOSIS — M25.651 DECREASED RANGE OF MOTION OF BOTH HIPS: ICD-10-CM

## 2025-04-24 DIAGNOSIS — R29.898 DECREASED STRENGTH OF LOWER EXTREMITY: ICD-10-CM

## 2025-04-24 DIAGNOSIS — M25.552 CHRONIC LEFT HIP PAIN: Primary | ICD-10-CM

## 2025-04-24 PROCEDURE — 97110 THERAPEUTIC EXERCISES: CPT

## 2025-04-24 PROCEDURE — 97161 PT EVAL LOW COMPLEX 20 MIN: CPT

## 2025-04-24 PROCEDURE — 97535 SELF CARE MNGMENT TRAINING: CPT

## 2025-04-24 NOTE — PROGRESS NOTES
"Physical Therapy Initial Evaluation and Plan of Care  [facility] [address], [address]  [place] [address]     Patient: Adelia Morris   : 1950  Referring practitioner: Lucas Church MD  Date of Initial Visit: 2025  Today's Date: 2025  Patient seen for 1 sessions  PT Clinic location: [address], [address]  [address]          Visit Diagnoses:    ICD-10-CM ICD-9-CM   1. Chronic left hip pain  M25.552 719.45    G89.29 338.29   2. Decreased strength of lower extremity  R29.898 729.89   3. Decreased range of motion of both hips  M25.651 719.55    M25.652    4. Functional gait abnormality  R26.89 781.2       Subjective Questionnaire: LEFS:     Subjective Evaluation    History of Present Illness  Mechanism of injury: I had continued with the exercises that I had been prescribed for the deep ache in my hip, after continuing these the pain went away. I have been having a new pain in the L hip that started about 3-4 weeks ago. I started noticing the pain but was pushing through them but recently the pain has increased and It is hard to get up out of chairs, walk, and sleep. The pain does wake me up and makes it hard to sleep. I can't lay on my L side because of the pain. When I am walking the pain goes down to the knee. It feels like muscle pain. When I am sitting the pain is there but not as severe, it is more uncomfortable. Most of the pain is in the outside of my leg but I do have some hamstring cramps.   Back in November I was diagnosed with heart arrhythmias and they said this has improved but I continue to have something going on with my mitral valve which has a \"backflow.\" Since all this I feel like I don't have any energy. My cardiologist said to continue to walk on the TM but this increases the pain in my L hip. Standing for a long period of time also increases the pain.   I do have some numbness and " tingling in the leg that is worse with standing. The tingling goes into the foot.   I have not been able to do my exercises as consistently as before I lost my energy. I do try to get up and walk around every 30 minute.       Patient Occupation: Retired Pain  Current pain ratin  At best pain ratin  At worst pain ratin  Quality: dull ache, sharp, throbbing and cramping  Relieving factors: ice, medications and heat (Alieve)  Aggravating factors: ambulation, sleeping, squatting, stairs, standing and lifting    Social Support  Lives in: multiple-level home (stairs to bedrooms and basement)    Treatments  Previous treatment: physical therapy  Patient Goals  Patient goals for therapy: decreased pain, improved balance, increased motion, increased strength and independence with ADLs/IADLs  Patient goal: I want to decrease pain to improve my ability to do stairs and ADLS and improve balance as well.       Medical history: Osteopenia of hip, heart arhythmia. See chart for further detail.   Therapy Precautions: N/A      Objective          Palpation   Left   No palpable tenderness to the distal biceps femoris, distal semimembranosus, distal semitendinosus, proximal biceps femoris, proximal semimembranosus, proximal semitendinosus and quadratus lumborum.   Tenderness of the adductor brevis, adductor longus, adductor edmond, gluteus neville, gluteus medius, lateral gastrocnemius, medial gastrocnemius, piriformis, rectus femoris, sartorius, TFL, vastus lateralis and vastus medialis.     Tenderness     Left Hip   Tenderness in the greater trochanter and iliac crest.   Left Knee   Tenderness in the ITB, lateral joint line and pes anserinus. No tenderness in the medial joint line and patellar tendon.     Active Range of Motion   Left Hip   Flexion: 110 degrees   External rotation (90/90): 20 degrees   Internal rotation (90/90): 25 degrees     Right Hip   Flexion: 110 degrees   External rotation (90/90): 25 degrees    Internal rotation (90/90): 30 degrees     Strength/Myotome Testing     Left Hip   Planes of Motion   Flexion: 4-  Abduction: 4-  External rotation: 4-  Internal rotation: 4- (pain)    Right Hip   Planes of Motion   Flexion: 4  External rotation: 4-  Internal rotation: 4-    Left Knee   Flexion: 4  Extension: 4    Right Knee   Flexion: 4  Extension: 4+    Tests     Left Hip   Positive CARLO and FADIR.   Negative scour.     Additional Tests Details  Increased pain with PROM into hip flexion     Ambulation     Ambulation: Level Surfaces   Ambulation without assistive device: independent    Observational Gait   Gait: asymmetric   Decreased walking speed and stride length.     Quality of Movement During Gait     Pelvis    Pelvis (Left): Positive Trendelenburg.   Pelvis (Right): Positive Trendelenburg.           Assessment & Plan       Assessment  Impairments: abnormal gait, abnormal or restricted ROM, activity intolerance, impaired balance, impaired physical strength, lacks appropriate home exercise program and pain with function   Functional limitations: carrying objects, lifting, sleeping, walking, uncomfortable because of pain, moving in bed, sitting and standing   Assessment details: Pt is a 74 year old female who presents to PT with symptoms consistent with L hip greater trochanteric bursitis and ITB pain. Upon initial evaluation she presents with the following deficits and impairments: decreased LE strength, decreased B hip ROM, decreased tolerance to bed mobility, and gait abnormalities including decreased tip and stride length and B Trendelenburg gait. These deficits make it difficult for the patient to complete ADLs including cooking, cleaning, grocery shopping, ascending and descending stairs, and any other activities that involve prolonged standing or walking. She has a difficult time sleeping and participating in her cardiovascular HEP. Pt would benefit from skilled PT intervention to address the  deficits noted and to improve overall quality of life.   Prognosis: good    Goals  Plan Goals:  SHORT TERM GOALS: 6 weeks  1.  Patient to be compliant with HEP and demo good efficiency with TE  2.  Report < 2 sleep disturbances 2° hip pain.     3.  Increased Lumbar and SIJ mobility to allow for improved pelvic alignment and gait mechanics (equal step length and level pelvis throughout gait).    4.  Increased hip ER/IR ROM to WFL (IR to 30°) degrees to allow for increased ease with bed mobility and squatting.  5. Pt will report minimal-no tenderness to palpation with firm pressure.   6. Pt. Able to ambulate up to 15 min with pain < 4/10 with acceptable pattern.    7. Patient to report seeing at least a 50% improvement since beginning OPT.    LONG TERM GOALS: 12 weeks  1.  Pt. to score > 80% perceived ability on LEFS  2.  Pain level < 2/10 in hips with all activities including sitting/standing > 1/2 an hr continuously.   3.  Hip  AROM to WNL to allow for return to ADL's/IADLS and functional activities without increased symptoms  4. Hip strength to 4+/5  to allow for pushing, pulling and more strenuous activities to occur without pain.  Walk > 30 min.  no pain  5. No palpable tenderness to the hip in order for patient to return to normal sleeping habits of SL.  6. Patient to report seeing at least an 80% improvement since beginning OPT.         Plan  Therapy options: will be seen for skilled therapy services  Planned modality interventions: cryotherapy, electrical stimulation/Russian stimulation, iontophoresis, TENS, thermotherapy (hydrocollator packs), traction and ultrasound  Other planned modality interventions: Dry Needling  Planned therapy interventions: abdominal trunk stabilization, ADL retraining, body mechanics training, balance/weight-bearing training, flexibility, functional ROM exercises, gait training, home exercise program, joint mobilization, manual therapy, neuromuscular re-education, postural training,  soft tissue mobilization, spinal/joint mobilization, strengthening, stretching and therapeutic activities  Frequency: 2x week  Duration in weeks: 12  Treatment plan discussed with: patient        See flowsheets for treatment detail.  Education: Discussed underlying deficits, HEP, and POC.     History # of Personal Factors and/or Comorbidities: LOW (0)  Examination of Body System(s): # of elements: LOW (1-2)  Clinical Presentation: STABLE   Clinical Decision Making: LOW       Timed:         Manual Therapy:    -     mins  16764;     Therapeutic Exercise:    16     mins  95606;     Neuromuscular Anthony:    -    mins  63755;    Therapeutic Activity:     -     mins  04031;     Gait Training:           mins  42543;     Ultrasound:          mins  15467;    Ionto                                   mins   65538  Self Care                       8     mins   12936      Un-Timed:  Electrical Stimulation:         mins  24223 ( );  Dry Needling          mins self-pay  Traction          mins 44474  Low Eval     24     Mins  28423  Mod Eval     -     Mins  45081  High Eval                       -     Mins  33055  Re-Eval                               mins  92059      Timed Treatment:   24   mins   Total Treatment:     48   mins    PT SIGNATURE: Sivan Akbar PT   [place] [facility] license #: [id number]  DATE TREATMENT INITIATED: 4/24/2025    Initial Certification  Certification Period: 7/22/2025  I certify that the therapy services are furnished while this patient is under my care.  The services outlined above are required by this patient, and will be reviewed every 90 days.    PHYSICIAN: Lucas Church MD  NPI: [id number]                                      DATE:     Please sign and return via fax to [place] - Fax #: [phone]. Thank you, [facility].

## 2025-04-28 ENCOUNTER — TREATMENT (OUTPATIENT)
Dept: PHYSICAL THERAPY | Facility: CLINIC | Age: 75
End: 2025-04-28
Payer: MEDICARE

## 2025-04-28 DIAGNOSIS — R26.89 FUNCTIONAL GAIT ABNORMALITY: ICD-10-CM

## 2025-04-28 DIAGNOSIS — R29.898 DECREASED STRENGTH OF LOWER EXTREMITY: ICD-10-CM

## 2025-04-28 DIAGNOSIS — G89.29 CHRONIC LEFT HIP PAIN: Primary | ICD-10-CM

## 2025-04-28 DIAGNOSIS — M25.652 DECREASED RANGE OF MOTION OF BOTH HIPS: ICD-10-CM

## 2025-04-28 DIAGNOSIS — M25.651 DECREASED RANGE OF MOTION OF BOTH HIPS: ICD-10-CM

## 2025-04-28 DIAGNOSIS — M25.552 CHRONIC LEFT HIP PAIN: Primary | ICD-10-CM

## 2025-04-28 PROCEDURE — 97112 NEUROMUSCULAR REEDUCATION: CPT

## 2025-04-28 PROCEDURE — 97110 THERAPEUTIC EXERCISES: CPT

## 2025-04-28 NOTE — PROGRESS NOTES
"Physical Therapy Daily Treatment Note  [facility] [place]  [address], [address]  [place] [id number]     Patient: Adelia Morris  : 1950  Referring practitioner: Lucas Church MD  Today's Date: 2025    VISIT#: 2    Visit Diagnoses:    ICD-10-CM ICD-9-CM   1. Chronic left hip pain  M25.552 719.45    G89.29 338.29   2. Decreased strength of lower extremity  R29.898 729.89   3. Decreased range of motion of both hips  M25.651 719.55    M25.652    4. Functional gait abnormality  R26.89 781.2       Subjective   Adelia Morris reports: that she has seen some small improvements, still hard to put weight on the leg while going up the stairs. She feels a little \"looser.\"       Objective       See Exercise, Manual, and Modality Logs for complete treatment.     Patient Education: HEP review  Exercise rationale/ pain free exercise performance  Alternate exercise positions  Verbal/Tactile cues to ensure correct exercise performance/technique       Assessment/Plan  Patient demonstrates good tolerance to continued and new therapeutic exercises on this date with no report of increased pain during or at the end of the session. Verbal cues were provided throughout for proper form and technique. Added SLR, supine hamstring stretch, and STS. Will continue to progress as tolerated.       Progress per Plan of Care and Progress strengthening /stabilization /functional activity          Timed:         Manual Therapy:    -     mins  75926;     Therapeutic Exercise:    15     mins  38597;     Neuromuscular Anthony:    10    mins  08768;    Therapeutic Activity:     5     mins  12310;     Gait Training:           mins  72503;     Ultrasound:          mins  98451;    Ionto:                                   mins  07383  Self Care:                       -     mins  66135    Un-Timed:  Electrical Stimulation:         mins  25070 (MC );  Dry Needling          mins self-pay  Traction         "  mins 83619  Re-Eval                               mins  12115  Group Therapy           ___ mins 61286    Timed Treatment:   30   mins   Total Treatment:     45   mins    Sivan Akbar PT  Physical Therapist  [place] license #: [id number]

## 2025-05-01 ENCOUNTER — TREATMENT (OUTPATIENT)
Dept: PHYSICAL THERAPY | Facility: CLINIC | Age: 75
End: 2025-05-01
Payer: MEDICARE

## 2025-05-01 DIAGNOSIS — R29.898 DECREASED STRENGTH OF LOWER EXTREMITY: ICD-10-CM

## 2025-05-01 DIAGNOSIS — G89.29 CHRONIC LEFT HIP PAIN: Primary | ICD-10-CM

## 2025-05-01 DIAGNOSIS — M25.552 CHRONIC LEFT HIP PAIN: Primary | ICD-10-CM

## 2025-05-01 DIAGNOSIS — R26.89 FUNCTIONAL GAIT ABNORMALITY: ICD-10-CM

## 2025-05-01 DIAGNOSIS — M25.652 DECREASED RANGE OF MOTION OF BOTH HIPS: ICD-10-CM

## 2025-05-01 DIAGNOSIS — M25.651 DECREASED RANGE OF MOTION OF BOTH HIPS: ICD-10-CM

## 2025-05-01 PROCEDURE — 97140 MANUAL THERAPY 1/> REGIONS: CPT

## 2025-05-01 PROCEDURE — 97110 THERAPEUTIC EXERCISES: CPT

## 2025-05-01 NOTE — PROGRESS NOTES
Physical Therapy Daily Treatment Note  [facility] [place]  [address], [address]  [place] [id number]     Patient: Adelia Morris  : 1950  Referring practitioner: Lucas Church MD  Today's Date: 2025    VISIT#: 3    Visit Diagnoses:    ICD-10-CM ICD-9-CM   1. Chronic left hip pain  M25.552 719.45    G89.29 338.29   2. Decreased strength of lower extremity  R29.898 729.89   3. Decreased range of motion of both hips  M25.651 719.55    M25.652    4. Functional gait abnormality  R26.89 781.2       Subjective   Adelia Morris reports: that she is a little sore today, both hips are bothering her but the R>L.       Objective       See Exercise, Manual, and Modality Logs for complete treatment.     Patient Education: HEP review  Exercise rationale/ pain free exercise performance  Alternate exercise positions  Verbal/Tactile cues to ensure correct exercise performance/technique       Assessment/Plan  Patient demonstrates good tolerance to continued therapeutic exercises on this date with no report of increased pain in the hips. She tolerated STM well. Verbal cues were provided throughout for proper form and technique. She required verbal cues to avoid valgus collapse on the right knee with STS. Discussed using the TM at home but starting off with low durations and speed. Will continue to progress as tolerated.       Progress per Plan of Care and Progress strengthening /stabilization /functional activity          Timed:         Manual Therapy:    8     mins  91796;     Therapeutic Exercise:    12     mins  80762;     Neuromuscular Anthony:    -    mins  39734;    Therapeutic Activity:     5     mins  18376;     Gait Training:           mins  88008;     Ultrasound:          mins  40000;    Ionto:                                   mins  14727  Self Care:                       5     mins  97101    Un-Timed:  Electrical Stimulation:         mins  02540 ( );  Dry Needling           mins self-pay  Traction          mins 79459  Re-Eval                               mins  22790  Group Therapy           ___ mins 11461    Timed Treatment:   30   mins   Total Treatment:     54   mins    Sivan Akbar PT  Physical Therapist  [place] license #: [id number]

## 2025-05-06 ENCOUNTER — TELEPHONE (OUTPATIENT)
Dept: CARDIOLOGY | Age: 75
End: 2025-05-06
Payer: MEDICARE

## 2025-05-06 ENCOUNTER — TREATMENT (OUTPATIENT)
Dept: PHYSICAL THERAPY | Facility: CLINIC | Age: 75
End: 2025-05-06
Payer: MEDICARE

## 2025-05-06 DIAGNOSIS — M25.651 DECREASED RANGE OF MOTION OF BOTH HIPS: ICD-10-CM

## 2025-05-06 DIAGNOSIS — M25.652 DECREASED RANGE OF MOTION OF BOTH HIPS: ICD-10-CM

## 2025-05-06 DIAGNOSIS — R29.898 DECREASED STRENGTH OF LOWER EXTREMITY: ICD-10-CM

## 2025-05-06 DIAGNOSIS — M25.552 CHRONIC LEFT HIP PAIN: Primary | ICD-10-CM

## 2025-05-06 DIAGNOSIS — R26.89 FUNCTIONAL GAIT ABNORMALITY: ICD-10-CM

## 2025-05-06 DIAGNOSIS — G89.29 CHRONIC LEFT HIP PAIN: Primary | ICD-10-CM

## 2025-05-06 PROCEDURE — 97112 NEUROMUSCULAR REEDUCATION: CPT

## 2025-05-06 PROCEDURE — 97140 MANUAL THERAPY 1/> REGIONS: CPT

## 2025-05-06 NOTE — TELEPHONE ENCOUNTER
Pt called and states that her HR has been in the low side like 33's -60 and intermittent head ache. She want to know does she need to hold her metoprolol succinate 25 mg BID  and ranexa 500 mg BID.      Pt was last seen 01/22/2025       Assessment/Plan:   Generalized body weakness/shortness of breath-likely from symptomatic PVCs-she had 22% PVC burden on Holter.  Repeat Holter on metoprolol in December 2024 showed PVC burden of 39% with singlets, couplets and few triplets.  PVCs improved with walking on treadmill.  Normal left ventricular ejection fraction.  She was started on ranolazine and repeat Holter in January 2025 showsed rare PVCs.  Moderate MR with posteriorly directed jet-PVCs likely contributing.  Mild TR present.  Essential hypertension  Constipation likely from Rhinolast and     Doing fairly well from cardiac standpoint.  Vital signs within range.  Normal PVCs on EKG.  No cardiac murmur.  Repeat echo to follow-up on moderate mitral valve regurgitation.  Treat constipation symptomatically for now if persistent will decrease dose of regularly.  Going forward I will try to wean her off metoprolol/spironolactone.  Follow-up in cardiac clinic in 1 year or sooner with concerns.    Thanks  Melva KUMAR

## 2025-05-07 ENCOUNTER — CLINICAL SUPPORT (OUTPATIENT)
Dept: CARDIOLOGY | Age: 75
End: 2025-05-07
Payer: MEDICARE

## 2025-05-07 ENCOUNTER — LAB (OUTPATIENT)
Dept: LAB | Facility: HOSPITAL | Age: 75
End: 2025-05-07
Payer: MEDICARE

## 2025-05-07 VITALS — HEART RATE: 84 BPM | DIASTOLIC BLOOD PRESSURE: 84 MMHG | SYSTOLIC BLOOD PRESSURE: 142 MMHG

## 2025-05-07 DIAGNOSIS — I49.3 FREQUENT UNIFOCAL PVCS: Primary | ICD-10-CM

## 2025-05-07 DIAGNOSIS — I49.3 FREQUENT UNIFOCAL PVCS: ICD-10-CM

## 2025-05-07 LAB
ANION GAP SERPL CALCULATED.3IONS-SCNC: 2.5 MMOL/L (ref 5–15)
BUN SERPL-MCNC: 14 MG/DL (ref 8–23)
BUN/CREAT SERPL: 14.6 (ref 7–25)
CALCIUM SPEC-SCNC: 9.6 MG/DL (ref 8.6–10.5)
CHLORIDE SERPL-SCNC: 105 MMOL/L (ref 98–107)
CO2 SERPL-SCNC: 30.5 MMOL/L (ref 22–29)
CREAT SERPL-MCNC: 0.96 MG/DL (ref 0.57–1)
EGFRCR SERPLBLD CKD-EPI 2021: 62.6 ML/MIN/1.73
GLUCOSE SERPL-MCNC: 91 MG/DL (ref 65–99)
MAGNESIUM SERPL-MCNC: 2.9 MG/DL (ref 1.6–2.4)
POTASSIUM SERPL-SCNC: 5.6 MMOL/L (ref 3.5–5.2)
SODIUM SERPL-SCNC: 138 MMOL/L (ref 136–145)

## 2025-05-07 PROCEDURE — 83735 ASSAY OF MAGNESIUM: CPT

## 2025-05-07 PROCEDURE — 93000 ELECTROCARDIOGRAM COMPLETE: CPT | Performed by: INTERNAL MEDICINE

## 2025-05-07 PROCEDURE — 36415 COLL VENOUS BLD VENIPUNCTURE: CPT

## 2025-05-07 PROCEDURE — 80048 BASIC METABOLIC PNL TOTAL CA: CPT

## 2025-05-07 RX ORDER — METOPROLOL SUCCINATE 25 MG/1
50 TABLET, EXTENDED RELEASE ORAL 2 TIMES DAILY
Qty: 180 TABLET | Refills: 3 | Status: SHIPPED | OUTPATIENT
Start: 2025-05-07

## 2025-05-07 NOTE — PROGRESS NOTES
Procedure     ECG 12 Lead    Date/Time: 5/7/2025 1:45 PM  Performed by: Donnie Villagomez MD    Authorized by: Donnie Villagomez MD  Comparison: compared with previous ECG from 1/22/2025  Comparison to previous ECG: Frequent PVCs are new.  Rhythm: sinus rhythm  Ectopy: unifocal PVCs  Rate: normal  Conduction: conduction normal  ST Segments: ST segments normal  T Waves: T waves normal  QRS axis: normal  Other: no other findings    Clinical impression: abnormal EKG

## 2025-05-07 NOTE — PROGRESS NOTES
Pt present today for a BP Check and ECG. Allergies, Hx, and Medications reviewed and confirmed.     BP/HR are as follows: BP L ARM:142/84   HR:84    Pt states c/o unsteady        Per Dr Leyva, pt okay to go home today and send it to Dr Palma.    Recommendations:      Melva Tyler

## 2025-05-08 ENCOUNTER — TREATMENT (OUTPATIENT)
Dept: PHYSICAL THERAPY | Facility: CLINIC | Age: 75
End: 2025-05-08
Payer: MEDICARE

## 2025-05-08 DIAGNOSIS — R29.898 DECREASED STRENGTH OF LOWER EXTREMITY: ICD-10-CM

## 2025-05-08 DIAGNOSIS — M25.651 DECREASED RANGE OF MOTION OF BOTH HIPS: ICD-10-CM

## 2025-05-08 DIAGNOSIS — G89.29 CHRONIC LEFT HIP PAIN: Primary | ICD-10-CM

## 2025-05-08 DIAGNOSIS — M25.652 DECREASED RANGE OF MOTION OF BOTH HIPS: ICD-10-CM

## 2025-05-08 DIAGNOSIS — M25.552 CHRONIC LEFT HIP PAIN: Primary | ICD-10-CM

## 2025-05-08 DIAGNOSIS — R26.89 FUNCTIONAL GAIT ABNORMALITY: ICD-10-CM

## 2025-05-08 PROCEDURE — 97110 THERAPEUTIC EXERCISES: CPT

## 2025-05-08 PROCEDURE — 97112 NEUROMUSCULAR REEDUCATION: CPT

## 2025-05-08 NOTE — PROGRESS NOTES
Physical Therapy Daily Treatment Note  [facility] [place]  [address], [address]  [place] [id number]     Patient: Adelia Morris  : 1950  Referring practitioner: Lucas Church MD  Today's Date: 2025    VISIT#: 5    Visit Diagnoses:    ICD-10-CM ICD-9-CM   1. Chronic left hip pain  M25.552 719.45    G89.29 338.29   2. Decreased strength of lower extremity  R29.898 729.89   3. Decreased range of motion of both hips  M25.651 719.55    M25.652    4. Functional gait abnormality  R26.89 781.2       Subjective   Adelia Morris reports: that she is sore today but thinks this is from all the walking she did yesterday at the hospital. She does report that she is seeing good improvement and is walking better, pain levels are better as well which makes it easier to go from sitting to standing and walking.       Objective       See Exercise, Manual, and Modality Logs for complete treatment.     Patient Education: HEP review  Exercise rationale/ pain free exercise performance  Alternate exercise positions  Verbal/Tactile cues to ensure correct exercise performance/technique       Assessment/Plan  Patient demonstrates good tolerance to continued therapeutic exercises on this date with no report of increased pain during or at the end of the session. Verbal cues were provided throughout for proper form and technique. No changes were made on this date, continued with LE strength and hip mobility. Will continue to progress as tolerated.       Progress per Plan of Care and Progress strengthening /stabilization /functional activity          Timed:         Manual Therapy:    -     mins  43916;     Therapeutic Exercise:    25     mins  97745;     Neuromuscular Anthony:    15    mins  82488;    Therapeutic Activity:     -     mins  61341;     Gait Training:           mins  87761;     Ultrasound:          mins  76525;    Ionto:                                   mins  52080  Self Care:                        -     mins  89765    Un-Timed:  Electrical Stimulation:         mins  98776 ( );  Dry Needling          mins self-pay  Traction          mins 79464  Re-Eval                               mins  42556  Group Therapy           ___ mins 93406    Timed Treatment:   40   mins   Total Treatment:     50   mins    Sivan Akbar PT  Physical Therapist  [facility] license #: [id number]

## 2025-05-12 ENCOUNTER — LAB (OUTPATIENT)
Facility: HOSPITAL | Age: 75
End: 2025-05-12
Payer: MEDICARE

## 2025-05-12 DIAGNOSIS — I49.3 FREQUENT UNIFOCAL PVCS: ICD-10-CM

## 2025-05-12 LAB
ANION GAP SERPL CALCULATED.3IONS-SCNC: 10.8 MMOL/L (ref 5–15)
BUN SERPL-MCNC: 11 MG/DL (ref 8–23)
BUN/CREAT SERPL: 12.2 (ref 7–25)
CALCIUM SPEC-SCNC: 9.2 MG/DL (ref 8.6–10.5)
CHLORIDE SERPL-SCNC: 106 MMOL/L (ref 98–107)
CO2 SERPL-SCNC: 23.2 MMOL/L (ref 22–29)
CREAT SERPL-MCNC: 0.9 MG/DL (ref 0.57–1)
EGFRCR SERPLBLD CKD-EPI 2021: 67.6 ML/MIN/1.73
GLUCOSE SERPL-MCNC: 111 MG/DL (ref 65–99)
MAGNESIUM SERPL-MCNC: 2.1 MG/DL (ref 1.6–2.4)
POTASSIUM SERPL-SCNC: 4.2 MMOL/L (ref 3.5–5.2)
SODIUM SERPL-SCNC: 140 MMOL/L (ref 136–145)

## 2025-05-12 PROCEDURE — 36415 COLL VENOUS BLD VENIPUNCTURE: CPT

## 2025-05-12 PROCEDURE — 80048 BASIC METABOLIC PNL TOTAL CA: CPT

## 2025-05-12 PROCEDURE — 83735 ASSAY OF MAGNESIUM: CPT

## 2025-05-13 ENCOUNTER — TREATMENT (OUTPATIENT)
Dept: PHYSICAL THERAPY | Facility: CLINIC | Age: 75
End: 2025-05-13
Payer: MEDICARE

## 2025-05-13 DIAGNOSIS — G89.29 CHRONIC LEFT HIP PAIN: Primary | ICD-10-CM

## 2025-05-13 DIAGNOSIS — R26.89 FUNCTIONAL GAIT ABNORMALITY: ICD-10-CM

## 2025-05-13 DIAGNOSIS — M25.552 CHRONIC LEFT HIP PAIN: Primary | ICD-10-CM

## 2025-05-13 DIAGNOSIS — R29.898 DECREASED STRENGTH OF LOWER EXTREMITY: ICD-10-CM

## 2025-05-13 DIAGNOSIS — M25.651 DECREASED RANGE OF MOTION OF BOTH HIPS: ICD-10-CM

## 2025-05-13 DIAGNOSIS — M25.652 DECREASED RANGE OF MOTION OF BOTH HIPS: ICD-10-CM

## 2025-05-13 PROCEDURE — 97110 THERAPEUTIC EXERCISES: CPT

## 2025-05-13 PROCEDURE — 97112 NEUROMUSCULAR REEDUCATION: CPT

## 2025-05-13 NOTE — PROGRESS NOTES
Physical Therapy Daily Treatment Note  [facility] [place]  [address], [address]  [place] [id number]     Patient: Adelia Morris  : 1950  Referring practitioner: Lucas Church MD  Today's Date: 2025    VISIT#: 6    Visit Diagnoses:    ICD-10-CM ICD-9-CM   1. Chronic left hip pain  M25.552 719.45    G89.29 338.29   2. Decreased strength of lower extremity  R29.898 729.89   3. Decreased range of motion of both hips  M25.651 719.55    M25.652    4. Functional gait abnormality  R26.89 781.2       Subjective   Adelia Morris reports: that she is seeing some improvements, her hips continue to be sore if she sits for too long. She continues to be more sore in the mornings as well.       Objective       See Exercise, Manual, and Modality Logs for complete treatment.     Patient Education: HEP review  Exercise rationale/ pain free exercise performance  Alternate exercise positions  Verbal/Tactile cues to ensure correct exercise performance/technique       Assessment/Plan  Patient demonstrates good tolerance to continued and progressed therapeutic exercises and activities. Continued to focus on LE strength and IASTM to B hips. She reports that the IASTM wasn't as sore today as it has been, demonstrating good benefits. Will continue to progress as tolerated.      Progress per Plan of Care and Progress strengthening /stabilization /functional activity          Timed:         Manual Therapy:    8     mins  70229;     Therapeutic Exercise:    8     mins  58396;     Neuromuscular Anthony:    10    mins  44102;    Therapeutic Activity:     4     mins  56763;     Gait Training:           mins  78598;     Ultrasound:          mins  83137;    Ionto:                                   mins  34181  Self Care:                       -     mins  72005    Un-Timed:  Electrical Stimulation:         mins  50277 ( );  Dry Needling          mins self-pay  Traction          mins  67584  Re-Eval                               mins  51484  Group Therapy           ___ mins 82794    Timed Treatment:   30   mins   Total Treatment:     60   mins    Sivan Akbar PT  Physical Therapist  [place] license #: [id number]

## 2025-05-15 ENCOUNTER — TREATMENT (OUTPATIENT)
Dept: PHYSICAL THERAPY | Facility: CLINIC | Age: 75
End: 2025-05-15
Payer: MEDICARE

## 2025-05-15 DIAGNOSIS — G89.29 CHRONIC LEFT HIP PAIN: Primary | ICD-10-CM

## 2025-05-15 DIAGNOSIS — M25.652 DECREASED RANGE OF MOTION OF BOTH HIPS: ICD-10-CM

## 2025-05-15 DIAGNOSIS — R26.89 FUNCTIONAL GAIT ABNORMALITY: ICD-10-CM

## 2025-05-15 DIAGNOSIS — M25.552 CHRONIC LEFT HIP PAIN: Primary | ICD-10-CM

## 2025-05-15 DIAGNOSIS — M25.651 DECREASED RANGE OF MOTION OF BOTH HIPS: ICD-10-CM

## 2025-05-15 DIAGNOSIS — R29.898 DECREASED STRENGTH OF LOWER EXTREMITY: ICD-10-CM

## 2025-05-15 PROCEDURE — 97112 NEUROMUSCULAR REEDUCATION: CPT

## 2025-05-15 PROCEDURE — 97110 THERAPEUTIC EXERCISES: CPT

## 2025-05-15 PROCEDURE — 97530 THERAPEUTIC ACTIVITIES: CPT

## 2025-05-15 NOTE — PROGRESS NOTES
"Physical Therapy Daily Treatment Note  [facility] [place]  [address], [address]  [place] [address]     Patient: Adelia Morris  : 1950  Referring practitioner: Lucas Church MD  Today's Date: 5/15/2025    VISIT#: 7    Visit Diagnoses:    ICD-10-CM ICD-9-CM   1. Chronic left hip pain  M25.552 719.45    G89.29 338.29   2. Decreased strength of lower extremity  R29.898 729.89   3. Decreased range of motion of both hips  M25.651 719.55    M25.652    4. Functional gait abnormality  R26.89 781.2       Subjective   Adelia Morris reports: that the L hip continues to be more sore than the right. She reports that when she stands up she has to stand for a second before going into walking. She tried some stairs at home but couldn't step up with the L. Last night the left hip pain down to the knee woke her up and she had to take an Aleve to go back to sleep. She continues to report feeling \"woozy\" and is trying to hydrate better.       Objective       See Exercise, Manual, and Modality Logs for complete treatment.     Patient Education: HEP review  Exercise rationale/ pain free exercise performance  Alternate exercise positions  Verbal/Tactile cues to ensure correct exercise performance/technique       Assessment/Plan  Patient demonstrates good tolerance to continued and progressed therapeutic exercises on this date with no report of increased hip pain during or at the end of the session. Progressed reps on this date with good tolerance. Had to modify STS on this date by adding an airex pad to the chair to increase ease with the activity. Will continue to progress as tolerated.       Progress per Plan of Care and Progress strengthening /stabilization /functional activity          Timed:         Manual Therapy:    -     mins  28350;     Therapeutic Exercise:    14     mins  27486;     Neuromuscular Anthony:    15    mins  70343;    Therapeutic Activity:     25     mins  26083;   "   Gait Training:           mins  70356;     Ultrasound:          mins  95247;    Ionto:                                   mins  97174  Self Care:                       -     mins  53688    Un-Timed:  Electrical Stimulation:         mins  70689 ( );  Dry Needling          mins self-pay  Traction          mins 37430  Re-Eval                               mins  04628  Group Therapy           ___ mins 83588    Timed Treatment:   54   mins   Total Treatment:     59   mins    Sivan Akbar PT  Physical Therapist  [place] [facility] license #: [id number]

## 2025-05-17 RX ORDER — FLUCONAZOLE 150 MG/1
150 TABLET ORAL
Qty: 1 TABLET | Refills: 0 | OUTPATIENT
Start: 2025-05-17

## 2025-05-21 ENCOUNTER — OFFICE VISIT (OUTPATIENT)
Dept: INTERNAL MEDICINE | Facility: CLINIC | Age: 75
End: 2025-05-21
Payer: MEDICARE

## 2025-05-21 VITALS
SYSTOLIC BLOOD PRESSURE: 144 MMHG | HEART RATE: 56 BPM | BODY MASS INDEX: 30.86 KG/M2 | DIASTOLIC BLOOD PRESSURE: 92 MMHG | OXYGEN SATURATION: 95 % | HEIGHT: 66 IN | TEMPERATURE: 97.4 F | WEIGHT: 192 LBS

## 2025-05-21 DIAGNOSIS — H81.11 BENIGN PAROXYSMAL POSITIONAL VERTIGO OF RIGHT EAR: Primary | ICD-10-CM

## 2025-05-21 DIAGNOSIS — H61.21 IMPACTED CERUMEN OF RIGHT EAR: ICD-10-CM

## 2025-05-21 PROCEDURE — 1125F AMNT PAIN NOTED PAIN PRSNT: CPT | Performed by: STUDENT IN AN ORGANIZED HEALTH CARE EDUCATION/TRAINING PROGRAM

## 2025-05-21 PROCEDURE — 99213 OFFICE O/P EST LOW 20 MIN: CPT | Performed by: STUDENT IN AN ORGANIZED HEALTH CARE EDUCATION/TRAINING PROGRAM

## 2025-05-21 PROCEDURE — 3080F DIAST BP >= 90 MM HG: CPT | Performed by: STUDENT IN AN ORGANIZED HEALTH CARE EDUCATION/TRAINING PROGRAM

## 2025-05-21 PROCEDURE — 3077F SYST BP >= 140 MM HG: CPT | Performed by: STUDENT IN AN ORGANIZED HEALTH CARE EDUCATION/TRAINING PROGRAM

## 2025-05-21 NOTE — PROGRESS NOTES
"  Christian Kovacs DO, FACP  Internal Medicine  [facility]  [address], [address]  [place] [address]  [phone]    Chief Complaint  Dizziness (Balance is off x 1 month)    SUBJECTIVE    History of Present Illness    Adelia Morris is a 74 y.o. female who presents to the office today as an established patient that last saw me on 11/11/2024.     States for the last month she has noticed \"blips\" of off balance. Doesn't experience \" spinning or swirling around \" but has felt unsteady and like she is leaning forward. \"Head feels full\". She clarifies it is not constant but seems with walking. States she would feel well if it were not for this symptom. No issues with muscle weakness or being unable to move any parts of her body. No trouble sleeping or slurred speech. \"It seems to be right around my head\". She has ringing in the ears but she has had this before \"it's amplified I think\". No vomiting or nausea. No blurry vision.   States \"it's like I'm walking on the floor of a boat\".     Allergies   Allergen Reactions    Bactrim [Sulfamethoxazole-Trimethoprim] Rash    Clindamycin/Lincomycin Rash        Outpatient Medications Marked as Taking for the 5/21/25 encounter (Office Visit) with Christian Kovacs DO   Medication Sig Dispense Refill    metoprolol succinate XL (TOPROL-XL) 25 MG 24 hr tablet Take 2 tablets by mouth 2 (Two) Times a Day. 180 tablet 3    ranolazine (RANEXA) 500 MG 12 hr tablet Take 1 tablet by mouth 2 (Two) Times a Day. 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   Constitutional:       General: She is not in acute distress.     Appearance: She is obese. She is not ill-appearing.   HENT:      Head: Normocephalic and atraumatic.      Comments: + omar hallpike on the right with nystagmus and reproduction of symptoms.      Right Ear: External ear normal. There is impacted cerumen.      Left Ear: Tympanic membrane, ear canal and external ear normal. There is no impacted cerumen.   Eyes:      General: No scleral icterus.  Pulmonary:      Effort: Pulmonary effort is normal. No respiratory distress.   Neurological:      Mental Status: She is alert.   Psychiatric:         Mood and Affect: Mood normal.         Behavior: Behavior normal.         Thought Content: Thought content normal.                             ASSESSMENT & PLAN     Diagnoses and all orders for this visit:    1. Benign paroxysmal positional vertigo of right ear (Primary)  -signs/symptoms/exam consistent with BPPV  -we discussed the nature of this condition  -at this point I recommend she proceed with vestibular rehab with PT. She is agreeable. Order placed.  -she lacks emergency symptoms and we discussed stroke symptoms specifically in depth and she declines those today  -     Ambulatory Referral to Physical Therapy for Evaluation & Treatment    2. Impacted cerumen of right ear  -begin ear wax drops otc           Follow Up  Return for Next scheduled follow up.    Patient/family had no further questions at this time and verbalized understanding of the plan discussed today.

## 2025-05-22 ENCOUNTER — TREATMENT (OUTPATIENT)
Dept: PHYSICAL THERAPY | Facility: CLINIC | Age: 75
End: 2025-05-22
Payer: MEDICARE

## 2025-05-22 DIAGNOSIS — M25.552 CHRONIC LEFT HIP PAIN: Primary | ICD-10-CM

## 2025-05-22 DIAGNOSIS — M25.652 DECREASED RANGE OF MOTION OF BOTH HIPS: ICD-10-CM

## 2025-05-22 DIAGNOSIS — R29.898 DECREASED STRENGTH OF LOWER EXTREMITY: ICD-10-CM

## 2025-05-22 DIAGNOSIS — M25.651 DECREASED RANGE OF MOTION OF BOTH HIPS: ICD-10-CM

## 2025-05-22 DIAGNOSIS — G89.29 CHRONIC LEFT HIP PAIN: Primary | ICD-10-CM

## 2025-05-22 DIAGNOSIS — R26.89 FUNCTIONAL GAIT ABNORMALITY: ICD-10-CM

## 2025-05-22 PROCEDURE — 97112 NEUROMUSCULAR REEDUCATION: CPT

## 2025-05-22 PROCEDURE — 97110 THERAPEUTIC EXERCISES: CPT

## 2025-05-22 NOTE — PROGRESS NOTES
Physical Therapy Daily Treatment Note  [facility] [place]  [address], [address]  [place] [id number]     Patient: Adelia Morris  : 1950  Referring practitioner: Lucas Church MD  Today's Date: 2025    VISIT#: 8    Visit Diagnoses:    ICD-10-CM ICD-9-CM   1. Chronic left hip pain  M25.552 719.45    G89.29 338.29   2. Decreased strength of lower extremity  R29.898 729.89   3. Decreased range of motion of both hips  M25.651 719.55    M25.652    4. Functional gait abnormality  R26.89 781.2       Subjective   Adelia Morris reports: that she is feeling better overall from Thursday she had a pretty bad headache. Hips are improving slowly. She can lay on the left side and walk much better than when she began OPT.       Objective       See Exercise, Manual, and Modality Logs for complete treatment.     Patient Education: HEP review  Exercise rationale/ pain free exercise performance  Alternate exercise positions  Verbal/Tactile cues to ensure correct exercise performance/technique       Assessment/Plan  Patient demonstrates good tolerance to continued and new therapeutic exercises on this date with no report of increased hip pain during or at the end of the session. She is able to perform exercise program with few verbal required throughout.   Continued with LE strength and continued to progress towards standing functional activities and activities targeting SLS. Will continue to progress as tolerated.     Progress per Plan of Care and Progress strengthening /stabilization /functional activity          Timed:         Manual Therapy:    -     mins  02283;     Therapeutic Exercise:    10     mins  23036;     Neuromuscular Anthony:    10    mins  04763;    Therapeutic Activity:     10     mins  56021;     Gait Training:           mins  22208;     Ultrasound:          mins  64031;    Ionto:                                   mins  58405  Self Care:                       -      mins  39782    Un-Timed:  Electrical Stimulation:         mins  92485 ( );  Dry Needling          mins self-pay  Traction          mins 92234  Re-Eval                               mins  96242  Group Therapy           ___ mins 50235    Timed Treatment:   30   mins   Total Treatment:     54   mins    Sivan Akbar PT  Physical Therapist  [facility] license #: [id number]   Statement Selected

## 2025-05-29 ENCOUNTER — TREATMENT (OUTPATIENT)
Dept: PHYSICAL THERAPY | Facility: CLINIC | Age: 75
End: 2025-05-29
Payer: MEDICARE

## 2025-05-29 DIAGNOSIS — R29.898 DECREASED STRENGTH OF LOWER EXTREMITY: ICD-10-CM

## 2025-05-29 DIAGNOSIS — R26.89 FUNCTIONAL GAIT ABNORMALITY: ICD-10-CM

## 2025-05-29 DIAGNOSIS — M25.552 CHRONIC LEFT HIP PAIN: Primary | ICD-10-CM

## 2025-05-29 DIAGNOSIS — G89.29 CHRONIC LEFT HIP PAIN: Primary | ICD-10-CM

## 2025-05-29 DIAGNOSIS — M25.651 DECREASED RANGE OF MOTION OF BOTH HIPS: ICD-10-CM

## 2025-05-29 DIAGNOSIS — M25.652 DECREASED RANGE OF MOTION OF BOTH HIPS: ICD-10-CM

## 2025-05-29 PROCEDURE — 97112 NEUROMUSCULAR REEDUCATION: CPT

## 2025-05-29 PROCEDURE — 97110 THERAPEUTIC EXERCISES: CPT

## 2025-05-29 PROCEDURE — 97530 THERAPEUTIC ACTIVITIES: CPT

## 2025-05-29 NOTE — PROGRESS NOTES
Physical Therapy Daily Treatment and Progress Note  [facility] [place]  [address], [address]  [place] [address]     Patient: Adelia Morris  : 1950  Referring practitioner: Lucas hCurch MD  Today's Date: 2025    VISIT#: 9    Visit Diagnoses:    ICD-10-CM ICD-9-CM   1. Chronic left hip pain  M25.552 719.45    G89.29 338.29   2. Decreased strength of lower extremity  R29.898 729.89   3. Decreased range of motion of both hips  M25.651 719.55    M25.652    4. Functional gait abnormality  R26.89 781.2     Subjective Questionnaire: LEFS:  -     Subjective Evaluation    Pain  Current pain ratin  At best pain ratin  At worst pain ratin         Adelia Morris reports: that she is continuing to have some difficulty with stairs and sitting for more than 15 minutes. She also has a hard time walking longer distances, she walked half a mile the other day but felt it that night. She reports seeing a 65-75% improvement and states that she doesn't think she'll get back to 100%.      Objective   Active Range of Motion   Left Hip   Flexion: 110 degrees   External rotation (90/90): 20 degrees (30 degrees)  Internal rotation (90/90): 25 degrees (30 degrees)     Right Hip   Flexion: 110 degrees   External rotation (90/90): 25 degrees (30 degrees)  Internal rotation (90/90): 30 degrees (40 degrees)     Strength/Myotome Testing      Left Hip   Planes of Motion   Flexion: 4- (4)  Abduction: 4-  External rotation: 4- (4+)  Internal rotation: 4- (pain) (4+)     Right Hip   Planes of Motion   Flexion: 4 (4+)  External rotation: 4- (4)  Internal rotation: 4- (4+)     Left Knee   Flexion: 4 (4+)  Extension: 4 (4+)     Right Knee   Flexion: 4 (4+)  Extension: 4+ (4+)    See Exercise, Manual, and Modality Logs for complete treatment.     Patient Education: HEP review  Exercise rationale/ pain free exercise performance  Alternate exercise positions  Verbal/Tactile cues  to ensure correct exercise performance/technique       Assessment/Plan  Patient has demonstrated good progress thus far with skilled therapeutic interventions. Subjectively she reports seeing a 65-75% improvement and pain levels are better controlled. Her outcome measure scored demonstrates a clinically significant improvement, demonstrating significant improvement with functional activities. Objectively, both hip and LE strength has improved significantly. These improvements have made it easier for the patient to ascend and descend stairs, sleep, and she has seen improvements in strength. However, she continues to have a difficult time sitting or standing for a prolonged period of time, walking more than a half  mile, ascending and descending stairs, standing in the kitchen to cook, and doing house chores. She has met 6/7 and is progressing well towards the others. I answered any questions that she had at the time and discussed continuing with the 2x a week as stated in the initial POC to continue to target the remaining deficits. She will greatly benefit from continued skilled therapeutic interventions.    Goals  Plan Goals:  SHORT TERM GOALS: 6 weeks  1.  Patient to be compliant with HEP and demo good efficiency with TE MET  2.  Report < 2 sleep disturbances 2° hip pain.   MET  3.  Increased Lumbar and SIJ mobility to allow for improved pelvic alignment and gait mechanics (equal step length and level pelvis throughout gait).  MET  4.  Increased hip ER/IR ROM to WFL (IR to 30°) degrees to allow for increased ease with bed mobility and squatting. MET  5. Pt will report minimal-no tenderness to palpation with firm pressure. Progressing   6. Pt. Able to ambulate up to 15 min with pain < 4/10 with acceptable pattern.  MET  7. Patient to report seeing at least a 50% improvement since beginning OPT. MET      LONG TERM GOALS: 12 weeks  1.  Pt. to score > 80% perceived ability on LEFS progressing   2.  Pain level < 2/10  in hips with all activities including sitting/standing > 1/2 an hr continuously. MET  3.  Hip  AROM to WNL to allow for return to ADL's/IADLS and functional activities without increased symptoms progressing   4. Hip strength to 4+/5  to allow for pushing, pulling and more strenuous activities to occur without pain.  Walk > 30 min.  no pain progressing   5. No palpable tenderness to the hip in order for patient to return to normal sleeping habits of SL. Progressing   6. Patient to report seeing at least an 80% improvement since beginning OPT. Progressing        Progress per Plan of Care and Progress strengthening /stabilization /functional activity          Timed:         Manual Therapy:    -     mins  97720;     Therapeutic Exercise:    25     mins  76857;     Neuromuscular Anthony:    10    mins  64368;    Therapeutic Activity:     10     mins  93035;     Gait Training:           mins  27395;     Ultrasound:          mins  40057;    Ionto:                                   mins  88592  Self Care:                       -     mins  10528    Un-Timed:  Electrical Stimulation:         mins  76201 ( );  Dry Needling          mins self-pay  Traction          mins 29563  Re-Eval                               mins  21629  Group Therapy           ___ mins 80490    Timed Treatment:   45   mins   Total Treatment:     57   mins    Sivan Akbar PT  Physical Therapist  [place] [facility] license #: [id number]

## 2025-05-29 NOTE — LETTER
Physical Therapy Daily Treatment and Progress Note  [facility] [place]  [address], [address]  [place] [address]     Patient: Adelia Morris  : 1950  Referring practitioner: Lucas Church MD  Today's Date: 2025    VISIT#: 9    Visit Diagnoses:    ICD-10-CM ICD-9-CM   1. Chronic left hip pain  M25.552 719.45    G89.29 338.29   2. Decreased strength of lower extremity  R29.898 729.89   3. Decreased range of motion of both hips  M25.651 719.55    M25.652    4. Functional gait abnormality  R26.89 781.2     Subjective Questionnaire: LEFS:  -     Subjective Evaluation    Pain  Current pain ratin  At best pain ratin  At worst pain ratin         Adelia Morris reports: that she is continuing to have some difficulty with stairs and sitting for more than 15 minutes. She also has a hard time walking longer distances, she walked half a mile the other day but felt it that night. She reports seeing a 65-75% improvement and states that she doesn't think she'll get back to 100%.      Objective   Active Range of Motion   Left Hip   Flexion: 110 degrees   External rotation (90/90): 20 degrees (30 degrees)  Internal rotation (90/90): 25 degrees (30 degrees)     Right Hip   Flexion: 110 degrees   External rotation (90/90): 25 degrees (30 degrees)  Internal rotation (90/90): 30 degrees (40 degrees)     Strength/Myotome Testing      Left Hip   Planes of Motion   Flexion: 4- (4)  Abduction: 4-  External rotation: 4- (4+)  Internal rotation: 4- (pain) (4+)     Right Hip   Planes of Motion   Flexion: 4 (4+)  External rotation: 4- (4)  Internal rotation: 4- (4+)     Left Knee   Flexion: 4 (4+)  Extension: 4 (4+)     Right Knee   Flexion: 4 (4+)  Extension: 4+ (4+)    See Exercise, Manual, and Modality Logs for complete treatment.     Patient Education: HEP review  Exercise rationale/ pain free exercise performance  Alternate exercise positions  Verbal/Tactile cues  to ensure correct exercise performance/technique       Assessment/Plan  Patient has demonstrated good progress thus far with skilled therapeutic interventions. Subjectively she reports seeing a 65-75% improvement and pain levels are better controlled. Her outcome measure scored demonstrates a clinically significant improvement, demonstrating significant improvement with functional activities. Objectively, both hip and LE strength has improved significantly. These improvements have made it easier for the patient to ascend and descend stairs, sleep, and she has seen improvements in strength. However, she continues to have a difficult time sitting or standing for a prolonged period of time, walking more than a half  mile, ascending and descending stairs, standing in the kitchen to cook, and doing house chores. She has met 6/7 and is progressing well towards the others. I answered any questions that she had at the time and discussed continuing with the 2x a week as stated in the initial POC to continue to target the remaining deficits. She will greatly benefit from continued skilled therapeutic interventions.    Goals  Plan Goals:  SHORT TERM GOALS: 6 weeks  1.  Patient to be compliant with HEP and demo good efficiency with TE MET  2.  Report < 2 sleep disturbances 2° hip pain.   MET  3.  Increased Lumbar and SIJ mobility to allow for improved pelvic alignment and gait mechanics (equal step length and level pelvis throughout gait).  MET  4.  Increased hip ER/IR ROM to WFL (IR to 30°) degrees to allow for increased ease with bed mobility and squatting. MET  5. Pt will report minimal-no tenderness to palpation with firm pressure. Progressing   6. Pt. Able to ambulate up to 15 min with pain < 4/10 with acceptable pattern.  MET  7. Patient to report seeing at least a 50% improvement since beginning OPT. MET      LONG TERM GOALS: 12 weeks  1.  Pt. to score > 80% perceived ability on LEFS progressing   2.  Pain level < 2/10  in hips with all activities including sitting/standing > 1/2 an hr continuously. MET  3.  Hip  AROM to WNL to allow for return to ADL's/IADLS and functional activities without increased symptoms progressing   4. Hip strength to 4+/5  to allow for pushing, pulling and more strenuous activities to occur without pain.  Walk > 30 min.  no pain progressing   5. No palpable tenderness to the hip in order for patient to return to normal sleeping habits of SL. Progressing   6. Patient to report seeing at least an 80% improvement since beginning OPT. Progressing        Progress per Plan of Care and Progress strengthening /stabilization /functional activity           Sivan Akbar PT  Physical Therapist  [place] [facility] license #: [id number]

## 2025-06-05 ENCOUNTER — TREATMENT (OUTPATIENT)
Dept: PHYSICAL THERAPY | Facility: CLINIC | Age: 75
End: 2025-06-05
Payer: MEDICARE

## 2025-06-05 DIAGNOSIS — M25.552 CHRONIC LEFT HIP PAIN: Primary | ICD-10-CM

## 2025-06-05 DIAGNOSIS — M25.651 DECREASED RANGE OF MOTION OF BOTH HIPS: ICD-10-CM

## 2025-06-05 DIAGNOSIS — R26.89 FUNCTIONAL GAIT ABNORMALITY: ICD-10-CM

## 2025-06-05 DIAGNOSIS — G89.29 CHRONIC LEFT HIP PAIN: Primary | ICD-10-CM

## 2025-06-05 DIAGNOSIS — R29.898 DECREASED STRENGTH OF LOWER EXTREMITY: ICD-10-CM

## 2025-06-05 DIAGNOSIS — M25.652 DECREASED RANGE OF MOTION OF BOTH HIPS: ICD-10-CM

## 2025-06-05 PROCEDURE — 97110 THERAPEUTIC EXERCISES: CPT

## 2025-06-05 PROCEDURE — 97530 THERAPEUTIC ACTIVITIES: CPT

## 2025-06-05 NOTE — PROGRESS NOTES
Physical Therapy Daily Treatment Note  [facility] [place]  [address], [address]  [place] [id number]     Patient: Adelia Morris  : 1950  Referring practitioner: Lucas Church MD  Today's Date: 2025    VISIT#: 10    Visit Diagnoses:    ICD-10-CM ICD-9-CM   1. Chronic left hip pain  M25.552 719.45    G89.29 338.29   2. Decreased strength of lower extremity  R29.898 729.89   3. Decreased range of motion of both hips  M25.651 719.55    M25.652    4. Functional gait abnormality  R26.89 781.2       Subjective   Adelia Morris reports: that the pain in her legs continues to decrease. She is able to do stairs easier with less pain in the hips. She continues to feel the most after sitting for a prolonged period of time and transferring into walking.      Objective       See Exercise, Manual, and Modality Logs for complete treatment.     Patient Education: HEP review  Exercise rationale/ pain free exercise performance  Alternate exercise positions  Verbal/Tactile cues to ensure correct exercise performance/technique       Assessment/Plan  Patient demonstrates good tolerance to continued and new therapeutic exercises and activities. Verbal cues were provided for slow and control and prolonged hold for isometric hold. Progressed to standing activities and more functional activities on this date. Will continue to progress as tolerated.       Progress per Plan of Care and Progress strengthening /stabilization /functional activity          Timed:         Manual Therapy:    -     mins  45005;     Therapeutic Exercise:    8     mins  02534;     Neuromuscular Anthony:    16    mins  96490;    Therapeutic Activity:     12     mins  05197;     Gait Training:           mins  80454;     Ultrasound:          mins  72907;    Ionto:                                   mins  05349  Self Care:                       -     mins  69936    Un-Timed:  Electrical Stimulation:         mins  44961  ( );  Dry Needling          mins self-pay  Traction          mins 32918  Re-Eval                               mins  74661  Group Therapy           ___ mins 18588    Timed Treatment:   36   mins   Total Treatment:      40   mins    Sivan Akbar PT  Physical Therapist  [facility] license #: [id number]

## 2025-06-10 ENCOUNTER — TREATMENT (OUTPATIENT)
Dept: PHYSICAL THERAPY | Facility: CLINIC | Age: 75
End: 2025-06-10
Payer: MEDICARE

## 2025-06-10 DIAGNOSIS — R26.89 FUNCTIONAL GAIT ABNORMALITY: ICD-10-CM

## 2025-06-10 DIAGNOSIS — M25.552 CHRONIC LEFT HIP PAIN: Primary | ICD-10-CM

## 2025-06-10 DIAGNOSIS — G89.29 CHRONIC LEFT HIP PAIN: Primary | ICD-10-CM

## 2025-06-10 DIAGNOSIS — M25.651 DECREASED RANGE OF MOTION OF BOTH HIPS: ICD-10-CM

## 2025-06-10 DIAGNOSIS — M25.652 DECREASED RANGE OF MOTION OF BOTH HIPS: ICD-10-CM

## 2025-06-10 DIAGNOSIS — R29.898 DECREASED STRENGTH OF LOWER EXTREMITY: ICD-10-CM

## 2025-06-10 PROCEDURE — 97112 NEUROMUSCULAR REEDUCATION: CPT

## 2025-06-10 PROCEDURE — 97530 THERAPEUTIC ACTIVITIES: CPT

## 2025-06-10 NOTE — PROGRESS NOTES
Physical Therapy Daily Treatment Note  [facility] [place]  [address], [address]  [place] [id number]     Patient: Adelia Morris  : 1950  Referring practitioner: Lucas Church MD  Today's Date: 6/10/2025    VISIT#: 11    Visit Diagnoses:    ICD-10-CM ICD-9-CM   1. Chronic left hip pain  M25.552 719.45    G89.29 338.29   2. Decreased strength of lower extremity  R29.898 729.89   3. Decreased range of motion of both hips  M25.651 719.55    M25.652    4. Functional gait abnormality  R26.89 781.2       Subjective   Adelia Morris reports: that she is feeling better but has some aching in the right goring region that wraps around back. She feels like she is compensating.       Objective       See Exercise, Manual, and Modality Logs for complete treatment.     Patient Education: HEP review  Exercise rationale/ pain free exercise performance  Alternate exercise positions  Verbal/Tactile cues to ensure correct exercise performance/technique       Assessment/Plan  Patient demonstrates good tolerance to continued and progressed therapeutic exercises and activities on this date with no report of increased hip pain during or at the end of the session. Focused on standing activities to target LE strength and hip stability in SLS. She is doing well and progressed exercises with a yellow TB at the ankles for side stepping, hip active ROM. Yellow TB across feet for standing marching. Will continue to progress as tolerated.       Progress per Plan of Care and Progress strengthening /stabilization /functional activity          Timed:         Manual Therapy:    -     mins  69659;     Therapeutic Exercise:    8     mins  06434;     Neuromuscular Anthony:    8    mins  06956;    Therapeutic Activity:     21     mins  46424;     Gait Training:           mins  16666;     Ultrasound:          mins  58908;    Ionto:                                   mins  19437  Self Care:                        -     mins  11726    Un-Timed:  Electrical Stimulation:         mins  84306 ( );  Dry Needling          mins self-pay  Traction          mins 54287  Re-Eval                               mins  64215  Group Therapy           ___ mins 25300    Timed Treatment:   37   mins   Total Treatment:     50   mins    Sivan Akbar PT  Physical Therapist  [facility] license #: [id number]

## 2025-06-12 ENCOUNTER — TREATMENT (OUTPATIENT)
Dept: PHYSICAL THERAPY | Facility: CLINIC | Age: 75
End: 2025-06-12
Payer: MEDICARE

## 2025-06-12 DIAGNOSIS — R26.89 FUNCTIONAL GAIT ABNORMALITY: ICD-10-CM

## 2025-06-12 DIAGNOSIS — R29.898 DECREASED STRENGTH OF LOWER EXTREMITY: ICD-10-CM

## 2025-06-12 DIAGNOSIS — G89.29 CHRONIC LEFT HIP PAIN: Primary | ICD-10-CM

## 2025-06-12 DIAGNOSIS — M25.552 CHRONIC LEFT HIP PAIN: Primary | ICD-10-CM

## 2025-06-12 DIAGNOSIS — M25.652 DECREASED RANGE OF MOTION OF BOTH HIPS: ICD-10-CM

## 2025-06-12 DIAGNOSIS — M25.651 DECREASED RANGE OF MOTION OF BOTH HIPS: ICD-10-CM

## 2025-06-12 PROCEDURE — 97112 NEUROMUSCULAR REEDUCATION: CPT

## 2025-06-12 PROCEDURE — 97530 THERAPEUTIC ACTIVITIES: CPT

## 2025-06-12 NOTE — PROGRESS NOTES
Physical Therapy Daily Treatment and DC Note  [facility] [place]  [address], [address]  [place] [address]     Patient: Adelia Morris  : 1950  Referring practitioner: Lucas Church MD  Today's Date: 2025    VISIT#: 12    Visit Diagnoses:    ICD-10-CM ICD-9-CM   1. Chronic left hip pain  M25.552 719.45    G89.29 338.29   2. Decreased strength of lower extremity  R29.898 729.89   3. Decreased range of motion of both hips  M25.651 719.55    M25.652    4. Functional gait abnormality  R26.89 781.2       Subjective Evaluation    Pain  Current pain ratin  At best pain ratin  At worst pain ratin (standing)         Adelia Morris reports: that she is doing well, she stood for too long yesterday while cooking dinner. She is ready for DC at this time and feels like she has a good program to continue with at home. She reports seeing an 85% improvement since beginning OPT.       Objective       See Exercise, Manual, and Modality Logs for complete treatment.     Patient Education: HEP review  Exercise rationale/ pain free exercise performance  Alternate exercise positions  Verbal/Tactile cues to ensure correct exercise performance/technique       Assessment/Plan  Patient has demonstrated good improvement with OPT skilled therapeutic interventions thus far and reports that she is ready for DC at the time. She is able to complete 2x10 STS and other functional activities with significant improvement compared to evaluation day. Reviewed HEP and answered any questions she had at the time and provided her with my contact information for any further questions.   DC to HEP. Pt instructed to call with questions or issues related to their injury.        Other: DC           Timed:         Manual Therapy:    -     mins  36150;     Therapeutic Exercise:    -     mins  85352;     Neuromuscular Anthony:    8    mins  34263;    Therapeutic Activity:     22     mins  92157;      Gait Training:           mins  13973;     Ultrasound:          mins  10082;    Ionto:                                   mins  30331  Self Care:                       -     mins  81944    Un-Timed:  Electrical Stimulation:         mins  12512 ( );  Dry Needling          mins self-pay  Traction          mins 43725  Re-Eval                               mins  52721  Group Therapy           ___ mins 66115    Timed Treatment:   30   mins   Total Treatment:     57   mins    Sivan Akbar PT  Physical Therapist  [place] [facility] license #: [id number]

## 2025-06-17 ENCOUNTER — HOSPITAL ENCOUNTER (OUTPATIENT)
Dept: PHYSICAL THERAPY | Facility: HOSPITAL | Age: 75
Setting detail: THERAPIES SERIES
Discharge: HOME | End: 2025-06-17
Payer: MEDICARE

## 2025-06-17 DIAGNOSIS — R42 DIZZINESS: Primary | ICD-10-CM

## 2025-06-17 DIAGNOSIS — R26.89 BALANCE PROBLEM: ICD-10-CM

## 2025-06-17 PROCEDURE — 95992 CANALITH REPOSITIONING PROC: CPT

## 2025-06-17 PROCEDURE — 97161 PT EVAL LOW COMPLEX 20 MIN: CPT

## 2025-06-17 NOTE — THERAPY EVALUATION
Outpatient Physical Therapy Vestibular Initial Evaluation  [facility]     Patient Name: Adelia Morris  : 1950  MRN: [id number]  Today's Date: 2025      Visit Date: 2025    Patient Active Problem List   Diagnosis    Healthcare maintenance    Encounter for screening colonoscopy    Colon polyp    Acute right-sided low back pain with sciatica    Allergic drug rash    Osteopenia of hip    Cutaneous cyst    History of colon polyps    Frequent unifocal PVCs    Essential hypertension    Moderate mitral valve regurgitation        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    Low back pain with right-sided sciatica     Low back strain     Menopause     Neck strain     Osteopenia     UTI (urinary tract infection)         Past Surgical History:   Procedure Laterality Date    BUNIONECTOMY      right     SECTION  1993    COLONOSCOPY N/A 2016    Procedure: COLONOSCOPY ith polypectomy (cold bx);  Surgeon: Jarrod John Jr., MD;  Location: [facility] ENDOSCOPY;  Service:     COLONOSCOPY      WNL PER PT      COLONOSCOPY N/A 2021    Procedure: COLONOSCOPY TO CECUM AND TI;  Surgeon: Jarrod John Jr., MD;  Location: [facility] ENDOSCOPY;  Service: General;  Laterality: N/A;  pre: history of colon polyps  post: diverticulosis    EYE SURGERY      cataracts    LAPAROSCOPIC TUBAL LIGATION           Visit Dx:     ICD-10-CM ICD-9-CM   1. Dizziness  R42 780.4   2. Balance problem  R26.89 781.99        Patient History       Row Name 25 0900             History    Chief Complaint Balance Problems;Dizziness  -MO      Brief Description of Current Complaint 75-year-old female patient referred to physical therapy for ongoing dizziness symptoms for almost 1 month now. Dizziness has been coming on for a while but seemed to progress. Thought it was just sinuses. She was seen at PCP  in May, found to be positive for right posterior canal BPPV. Current symptoms are hard to describe. Describes it as a feeling of unsteadiness, not necessarily going to fall but feeling like she sways when she walks. Doesn't really notice it in sitting. A a little dizzy when first sitting up in the morning, denies any true spinning. Feels it mostly in her eyes. Usually brief symptoms. September had covid, feels like there are long covid symptoms following. Notices it more today because sinuses are very stight.  -MO      Patient/Caregiver Goals Relief from dizziness  -MO         Pain     Pain Location Other (Comment)  N/A. Vestibular  -MO         Fall Risk Assessment    Any falls in the past year: No  -MO         Services    Prior Rehab/Home Health Experiences No  -MO         Daily Activities    Primary Language English  -MO      Are you able to read Yes  -MO      Are you able to write Yes  -MO      How does patient learn best? Listening;Reading;Demonstration  -MO      Does patient have problems with the following? None  -MO      Barriers to learning None  -MO      Pt Participated in POC and Goals Yes  -MO         Safety    Are you being hurt, hit, or frightened by anyone at home or in your life? No  -MO      Are you being neglected by a caregiver No  -MO      Have you had any of the following issues with N/A  -MO                User Key  (r) = Recorded By, (t) = Taken By, (c) = Cosigned By      Initials Name Provider Type    Marilu Robles, PT Physical Therapist                     Vestibular Darleenal       Row Name 06/17/25 0900             Occulomotor Exam Fixation Present    Occular ROM --  -MO      Spontaneous Nystagmus --  -MO      Gaze-induced Nystagmus --  -MO      Smooth Pursuit --  -MO      Saccades --  -MO      Convergence --  -MO         Positional Testing    Positional Testing With infrared goggles  -MO      Vertebrobasilar Artery Screen - Right Negative  -MO      Vertebrobasilar Artery Screen - Left  Negative  -MO      Shameka-Hallpike Right Upbeat, right rotatory nystagmus  no reported symptoms  -MO      [place]-Hallpike Right Onset Time  3s  -MO      [place]-Hallpike Right Duration Time  10s  -MO      Shameka-Hallpike Left Upbeat, left rotatory nystagmus  -MO      Shame[place]-Hallpike Left Onset Time  4s  -MO      [place]-Hallpike Left Duration Time  25s  -MO      Horizontal Roll Test Right No nystagmus  -MO      Horizontal Roll Test Left No nystagmus  -MO                User Key  (r) = Recorded By, (t) = Taken By, (c) = Cosigned By      Initials Name Provider Type    Marilu Robles, PT Physical Therapist                                Therapy Education  Education Details: Educated on PT role and POC; purpose of vestibular therapy including anatomy of vestibular system and 3 semi-circular canals; utilized model to enhance understanding. Discussed BPPV vs. Differential diagnoses.  Given: Symptoms/condition management  Program: New  How Provided: Verbal  Provided to: Patient  Level of Understanding: Verbalized       OP Exercises       Row Name 06/17/25 0900             Exercise 1    Exercise Name 1 epley/ CRM  -MO      Sets 1 1  -MO      Reps 1 2  -MO      Time 1 L PC  -MO                User Key  (r) = Recorded By, (t) = Taken By, (c) = Cosigned By      Initials Name Provider Type    Marilu Robles, PT Physical Therapist                                 PT OP Goals       Row Name 06/17/25 0900          PT Short Term Goals    STG Date to Achieve 07/17/25  -MO     STG 1 Pt. will be (-) for BPPV in L posterior canal to reduce symptoms of dizziness.  -MO     STG 1 Progress New  -MO     STG 2 Pt. will be (-) for BPPV in R posterior canal to reduce symptoms of dizziness.  -MO     STG 2 Progress New  -MO     STG 3 Pt will undergo further vestibular testing as needed with subsequent goals to be made  -MO     STG 3 Progress New  -MO        Long Term Goals    LTG Date to Achieve 08/16/25  -MO     LTG 1 Pt. will remain (-) for BPPV in all canals to  maintain improved QOL.  -MO     LTG 1 Progress New  -MO        Time Calculation    PT Goal Re-Cert Due Date 09/15/25  -MO               User Key  (r) = Recorded By, (t) = Taken By, (c) = Cosigned By      Initials Name Provider Type    Marilu Robles, PT Physical Therapist                     PT Assessment/Plan       Row Name 06/17/25 1200          PT Assessment    Functional Limitations Decreased safety during functional activities;Performance in leisure activities  -MO     Impairments Balance  -MO     Assessment Comments Adelia Morris is a 75 y.o. female referred to physical therapy for vestibular evaluation 2/2 ongoing dizziness/ imbalance symptoms. She presents with a stable clinical presentation, along with no remarkable comorbidities or personal factors that may impact her progress in the plan of care. Pt presents today (+) for BL PC BPPV. She reports overall subjectively mild symptoms bilaterally however L > R, elected to treat L PC with epley x2. (+) on retest however no subjective symptoms reported and reduced duration of nystagmus. Additional vestibular testing not completed this date however anticipate some underlying mild vestibular hypofunction that would benefit from further work up if symptoms persist. Her signs and symptoms are consistent with referring diagnosis. Pt will benefit from skilled PT to address the previous impairments and return to PLOF.  -MO     Please refer to paper survey for additional self-reported information No  -MO     Rehab Potential Good  -MO     Patient/caregiver participated in establishment of treatment plan and goals Yes  -MO     Patient would benefit from skilled therapy intervention Yes  -MO        PT Plan    PT Frequency 1x/week;2x/week  -MO     Predicted Duration of Therapy Intervention (PT) 3-5 visits  -MO     Planned CPT's? PT EVAL LOW COMPLEXITY: 87865;PT RE-EVAL: 02676;PT THER PROC EA 15 MIN: 28867;PT THER ACT EA 15 MIN: 25071;PT NEUROMUSC RE-EDUCATION EA 15 MIN:  00214;PT CANALITH REPOSITIONIN;PT SELF CARE/HOME MGMT/TRAIN EA 15: 36091  -MO     PT Plan Comments re-assess BPPV. If cleared, further vestibular testing with likely initiation of VOR  -MO               User Key  (r) = Recorded By, (t) = Taken By, (c) = Cosigned By      Initials Name Provider Type    Marilu Robles, PT Physical Therapist                     Outcome Measure Options: Dizziness Handicap Inventory  Dizziness Handicap Inventory Score: 46         Time Calculation:   Start Time: 915  Stop Time: 955  Time Calculation (min): 40 min  Untimed Charges  PT Eval/Re-eval Minutes: 25  PT Canalith Repositioning: 15  Total Minutes  Untimed Charges Total Minutes: 40   Total Minutes: 40   Therapy Charges for Today       Code Description Service Date Service Provider Modifiers Qty    39648391357 HC PT EVAL LOW COMPLEXITY 2 2025 Marilu Haque, PT GP 1    25028382580 HC PT CANALITH REPOSITIONING PER DAY 2025 Marilu Haque, PT GP 1            PT G-Codes  Outcome Measure Options: Dizziness Handicap Inventory         Marilu Haque PT  2025

## 2025-06-20 ENCOUNTER — HOSPITAL ENCOUNTER (OUTPATIENT)
Dept: PHYSICAL THERAPY | Facility: HOSPITAL | Age: 75
Setting detail: THERAPIES SERIES
Discharge: HOME | End: 2025-06-20
Payer: MEDICARE

## 2025-06-20 DIAGNOSIS — G89.29 CHRONIC LEFT HIP PAIN: ICD-10-CM

## 2025-06-20 DIAGNOSIS — R26.89 BALANCE PROBLEM: ICD-10-CM

## 2025-06-20 DIAGNOSIS — M25.552 CHRONIC LEFT HIP PAIN: ICD-10-CM

## 2025-06-20 DIAGNOSIS — R42 DIZZINESS: Primary | ICD-10-CM

## 2025-06-20 PROCEDURE — 97530 THERAPEUTIC ACTIVITIES: CPT

## 2025-06-20 PROCEDURE — 95992 CANALITH REPOSITIONING PROC: CPT

## 2025-06-20 NOTE — THERAPY TREATMENT NOTE
Outpatient Physical Therapy Vestibular Treatment Note  [facility]     Patient Name: Adelia Morris  : 1950  MRN: [id number]  Today's Date: 2025      Visit Date: 2025    Visit Dx:     ICD-10-CM ICD-9-CM   1. Dizziness  R42 780.4   2. Balance problem  R26.89 781.99   3. Chronic left hip pain  M25.552 719.45    G89.29 338.29       Patient Active Problem List   Diagnosis    Healthcare maintenance    Encounter for screening colonoscopy    Colon polyp    Acute right-sided low back pain with sciatica    Allergic drug rash    Osteopenia of hip    Cutaneous cyst    History of colon polyps    Frequent unifocal PVCs    Essential hypertension    Moderate mitral valve regurgitation        Vestibular Eval       Row Name 25 09             Positional Testing    Positional Testing With infrared goggles  -MP      Vertebrobasilar Artery Screen - Right Negative  -MP      Vertebrobasilar Artery Screen - Left Negative  -MP      [place]-Hallpike Right Upbeat, right rotatory nystagmus  -MP      [place]-Hallpike Right Onset Time  6sec  -MP      [place]-Hallpike Right Duration Time  11sec  -MP      [place]-Hallpike Left No nystagmus  -MP                User Key  (r) = Recorded By, (t) = Taken By, (c) = Cosigned By      Initials Name Provider Type    Sylvia Steve PT Physical Therapist                                 PT Assessment/Plan       Row Name 25 09          PT Assessment    Assessment Comments Pt. Returns to clinic for first follow up from initial evaluation for treatment of BPPV. Reports 45-50% improvement in overall symptoms since last treatment. She does have some baseline deficits in mobility related to LBP and imbalance which she just completed OP bout of PT last week for that. Pt. (-) L [place] Hallpike but mildly symptomatic on transition. (+) R [place] Hallpike with mild symptoms. Therefore treated R PC this date with Epley/CRMx1 with (-) retest. Pending continued symptoms at resolution of BPPV will  "begin adaptation/gaze stabilization techniques.  -MP        PT Plan    PT Plan Comments VOR when needed; reassess BPPV  -MP               User Key  (r) = Recorded By, (t) = Taken By, (c) = Cosigned By      Initials Name Provider Type    Sylvia Steve, PT Physical Therapist                        OP Exercises       Row Name 06/20/25 0900             Subjective    Subjective Comments I still have some if I turn my head to fast. If I turn too quick I feel some shifting or \"off\". Bending over if I go slow I do okay. I did not sleep on my L side and slept with head on a slant for 2 nights. I have that ringing in my ears.  -MP         Subjective Pain    Able to rate subjective pain? yes  -MP      Pre-Treatment Pain Level 0  -MP      Post-Treatment Pain Level 0  -MP         Total Minutes    45739 - PT Therapeutic Activity Minutes 9  -MP         Exercise 1    Exercise Name 1 epley/ CRM  -MP      Cueing 1 Verbal  -MP      Sets 1 1  -MP      Reps 1 1  -MP      Time 1 R PC  -MP      Additional Comments (-)  -MP         Exercise 2    Exercise Name 2 education: see tab  -MP      Time 2 9  -MP                User Key  (r) = Recorded By, (t) = Taken By, (c) = Cosigned By      Initials Name Provider Type    Sylvia Steve, PT Physical Therapist                                 PT OP Goals       Row Name 06/20/25 0900          PT Short Term Goals    STG Date to Achieve 07/17/25  -MP     STG 1 Pt. will be (-) for BPPV in L posterior canal to reduce symptoms of dizziness.  -MP     STG 1 Progress Ongoing  -MP     STG 2 Pt. will be (-) for BPPV in R posterior canal to reduce symptoms of dizziness.  -MP     STG 2 Progress Ongoing  -MP     STG 3 Pt will undergo further vestibular testing as needed with subsequent goals to be made  -MP     STG 3 Progress Ongoing  -MP        Long Term Goals    LTG Date to Achieve 08/16/25  -MP     LTG 1 Pt. will remain (-) for BPPV in all canals to maintain improved QOL.  -MP     LTG 1 Progress " Ongoing  -MP               User Key  (r) = Recorded By, (t) = Taken By, (c) = Cosigned By      Initials Name Provider Type    MP Sylvia Ford PT Physical Therapist                    Therapy Education  Education Details: anatomy of vestibular system, role of PT with BPPV, VOR, balance  Given: HEP, Symptoms/condition management  Program: Reinforced  How Provided: Verbal, Demonstration  Provided to: Patient  Level of Understanding: Demonstrated, Verbalized              Time Calculation:   Start Time: 0945  Stop Time: 1010  Time Calculation (min): 25 min  Total Timed Code Minutes- PT: 9 minute(s)  Timed Charges  16295 - PT Therapeutic Activity Minutes: 9  Untimed Charges  PT Canalith Repositionin  Total Minutes  Timed Charges Total Minutes: 9  Untimed Charges Total Minutes: 16   Total Minutes: 25   Therapy Charges for Today       Code Description Service Date Service Provider Modifiers Qty    91335147202 HC PT THERAPEUTIC ACT EA 15 MIN 2025 Sylvia Ford, PT GP 1    02064172241 HC PT CANALITH REPOSITIONING PER DAY 2025 Sylvia Ford PT GP 1                     Sylvia Ford PT  2025

## 2025-07-03 ENCOUNTER — HOSPITAL ENCOUNTER (OUTPATIENT)
Dept: PHYSICAL THERAPY | Facility: HOSPITAL | Age: 75
Setting detail: THERAPIES SERIES
Discharge: HOME | End: 2025-07-03
Payer: MEDICARE

## 2025-07-03 DIAGNOSIS — R42 DIZZINESS: Primary | ICD-10-CM

## 2025-07-03 DIAGNOSIS — R26.89 BALANCE PROBLEM: ICD-10-CM

## 2025-07-03 PROCEDURE — 95992 CANALITH REPOSITIONING PROC: CPT

## 2025-07-03 PROCEDURE — 97535 SELF CARE MNGMENT TRAINING: CPT

## 2025-07-03 NOTE — THERAPY TREATMENT NOTE
Outpatient Physical Therapy Vestibular Treatment Note  [facility]     Patient Name: Adelia Morris  : 1950  MRN: [id number]  Today's Date: 7/3/2025      Visit Date: 2025    Visit Dx:     ICD-10-CM ICD-9-CM   1. Dizziness  R42 780.4   2. Balance problem  R26.89 781.99       Patient Active Problem List   Diagnosis    Healthcare maintenance    Encounter for screening colonoscopy    Colon polyp    Acute right-sided low back pain with sciatica    Allergic drug rash    Osteopenia of hip    Cutaneous cyst    History of colon polyps    Frequent unifocal PVCs    Essential hypertension    Moderate mitral valve regurgitation                        PT Assessment/Plan       Row Name 25 1400          PT Assessment    Assessment Comments Ms. Juarez returns to PT, remains (+) for R PC BPPV. Treated with epley x2, significant reduction in severity and duration of nystagmus and subjective symptoms following. Reviewed post maneuver protocol, she verbalizes understanding. Will continue to address as symptoms linger.  -MO        PT Plan    PT Plan Comments reassess  -MO               User Key  (r) = Recorded By, (t) = Taken By, (c) = Cosigned By      Initials Name Provider Type    Marilu Robles, TASHIA Physical Therapist                        OP Exercises       Row Name 25 1200             Subjective    Subjective Comments not spinning, but very off  -MO         Exercise 1    Exercise Name 1 epley/ CRM  -MO      Cueing 1 Verbal  -MO      Sets 1 1  -MO      Reps 1 2  -MO      Time 1 R PC  -MO                User Key  (r) = Recorded By, (t) = Taken By, (c) = Cosigned By      Initials Name Provider Type    Marilu Robles PT Physical Therapist                                    Therapy Education  Education Details: reviewed anatomy w/ 3D model, BPPV, post maneuver protocol  Given: Symptoms/condition management  Program: Reinforced  How Provided: Verbal  Provided to: Patient  Level of Understanding:  Verbalized  58251 - PT Self Care/Mgmt Minutes: 10              Time Calculation:   Start Time: 1200  Stop Time: 1225  Time Calculation (min): 25 min  Timed Charges  25347 - PT Self Care/Mgmt Minutes: 10  Untimed Charges  PT Canalith Repositioning: 15  Total Minutes  Timed Charges Total Minutes: 10  Untimed Charges Total Minutes: 15   Total Minutes: 25   Therapy Charges for Today       Code Description Service Date Service Provider Modifiers Qty    29121182256 HC PT SELF CARE/MGMT/TRAIN EA 15 MIN 7/3/2025 Marilu Haque, PT GP 1    36146433293 HC PT CANALITH REPOSITIONING PER DAY 7/3/2025 Marilu Haque PT GP 1                     Marilu Haque PT  7/3/2025

## 2025-07-09 ENCOUNTER — HOSPITAL ENCOUNTER (OUTPATIENT)
Dept: PHYSICAL THERAPY | Facility: HOSPITAL | Age: 75
Setting detail: THERAPIES SERIES
Discharge: HOME | End: 2025-07-09
Payer: MEDICARE

## 2025-07-09 DIAGNOSIS — R26.89 BALANCE PROBLEM: ICD-10-CM

## 2025-07-09 DIAGNOSIS — R42 DIZZINESS: Primary | ICD-10-CM

## 2025-07-09 PROCEDURE — 97530 THERAPEUTIC ACTIVITIES: CPT

## 2025-07-09 NOTE — THERAPY TREATMENT NOTE
Outpatient Physical Therapy Vestibular Treatment Note  [facility]     Patient Name: Adelia Morris  : 1950  MRN: [id number]  Today's Date: 2025      Visit Date: 2025    Visit Dx:     ICD-10-CM ICD-9-CM   1. Dizziness  R42 780.4   2. Balance problem  R26.89 781.99       Patient Active Problem List   Diagnosis    Healthcare maintenance    Encounter for screening colonoscopy    Colon polyp    Acute right-sided low back pain with sciatica    Allergic drug rash    Osteopenia of hip    Cutaneous cyst    History of colon polyps    Frequent unifocal PVCs    Essential hypertension    Moderate mitral valve regurgitation                        PT Assessment/Plan       Row Name 25 1500          PT Assessment    Assessment Comments Ms. Juarez returns reporting improvement in dizziness symptoms, c/o ongoing imbalance. Through further questioning, pt reports imbalance has been gradually worsening since around susan of last year with balance impairment. Today she is (-) in BL PC for BPPV. Initiated seated VOR as beginning retraining as well as discussed further balance and vestibular testing the the future should symptoms persist. May likely benefit from more formalized balance program, will assess at next session and progress as appropriate.  -MO        PT Plan    PT Plan Comments reassess BPPV. formalized balance testing if appropriate  -MO               User Key  (r) = Recorded By, (t) = Taken By, (c) = Cosigned By      Initials Name Provider Type    Marilu Robles, PT Physical Therapist                        OP Exercises       Row Name 25 1400             Subjective    Subjective Comments I don't have any spinning sensation anymore but I have this shifting feeling in my head. My sinuses have been bad and I think that is having an impact  -MO         Subjective Pain    Able to rate subjective pain? yes  -MO      Pre-Treatment Pain Level 0  -MO      Post-Treatment Pain Level 0  -MO       Subjective Pain Comment vestibular  -MO         Total Minutes    56596 -  PT Neuromuscular Reeducation Minutes 5  -MO      89583 - PT Therapeutic Activity Minutes 15  -MO         Exercise 2    Exercise Name 2 seated VOR x1  -MO      Cueing 2 Verbal;Demo  -MO      Reps 2 1e  -MO      Time 2 30s  -MO                User Key  (r) = Recorded By, (t) = Taken By, (c) = Cosigned By      Initials Name Provider Type    Marilu Robles, PT Physical Therapist                                 PT OP Goals       Row Name 07/09/25 1500          PT Short Term Goals    STG Date to Achieve 07/17/25  -MO     STG 1 Pt. will be (-) for BPPV in L posterior canal to reduce symptoms of dizziness.  -MO     STG 1 Progress Met  -MO     STG 2 Pt. will be (-) for BPPV in R posterior canal to reduce symptoms of dizziness.  -MO     STG 2 Progress Met  -MO     STG 3 Pt will undergo further vestibular testing as needed with subsequent goals to be made  -MO     STG 3 Progress Ongoing  -MO        Long Term Goals    LTG Date to Achieve 08/16/25  -MO     LTG 1 Pt. will remain (-) for BPPV in all canals to maintain improved QOL.  -MO     LTG 1 Progress Ongoing  -MO               User Key  (r) = Recorded By, (t) = Taken By, (c) = Cosigned By      Initials Name Provider Type    Marilu Robles, PT Physical Therapist                    Therapy Education  Education Details: Initiated HEP: [id number]. Discussed BPPV vs vestibular dysfunction and balance impairment  Given: HEP, Symptoms/condition management  Program: New, Reinforced  How Provided: Verbal, Demonstration, Written  Provided to: Patient  Level of Understanding: Verbalized, Demonstrated              Time Calculation:   Start Time: 1400  Stop Time: 1420  Time Calculation (min): 20 min  Timed Charges  30478 -  PT Neuromuscular Reeducation Minutes: 5  09604 - PT Therapeutic Activity Minutes: 15  Total Minutes  Timed Charges Total Minutes: 20   Total Minutes: 20   Therapy Charges for Today       Code  Description Service Date Service Provider Modifiers Qty    74550845969  PT THERAPEUTIC ACT EA 15 MIN 7/9/2025 Marilu Haque, PT GP 1                     Marilu Haque, PT  7/9/2025

## 2025-07-16 ENCOUNTER — HOSPITAL ENCOUNTER (OUTPATIENT)
Dept: PHYSICAL THERAPY | Facility: HOSPITAL | Age: 75
Setting detail: THERAPIES SERIES
Discharge: HOME | End: 2025-07-16
Payer: MEDICARE

## 2025-07-16 DIAGNOSIS — R26.89 BALANCE PROBLEM: ICD-10-CM

## 2025-07-16 DIAGNOSIS — R42 DIZZINESS: Primary | ICD-10-CM

## 2025-07-16 PROCEDURE — 97112 NEUROMUSCULAR REEDUCATION: CPT

## 2025-07-16 PROCEDURE — 97530 THERAPEUTIC ACTIVITIES: CPT

## 2025-07-16 PROCEDURE — 95992 CANALITH REPOSITIONING PROC: CPT

## 2025-07-16 NOTE — THERAPY TREATMENT NOTE
Outpatient Physical Therapy Vestibular Treatment Note  [facility]     Patient Name: Adelia Morris  : 1950  MRN: [id number]  Today's Date: 2025      Visit Date: 2025    Visit Dx:     ICD-10-CM ICD-9-CM   1. Dizziness  R42 780.4   2. Balance problem  R26.89 781.99       Patient Active Problem List   Diagnosis    Healthcare maintenance    Encounter for screening colonoscopy    Colon polyp    Acute right-sided low back pain with sciatica    Allergic drug rash    Osteopenia of hip    Cutaneous cyst    History of colon polyps    Frequent unifocal PVCs    Essential hypertension    Moderate mitral valve regurgitation        Vestibular Eval       Row Name 25 1300             Positional Testing    Positional Testing With infrared goggles  -MO      Vertebrobasilar Artery Screen - Right Negative  -MO      Vertebrobasilar Artery Screen - Left Negative  -MO      Shameka-Hallpike Right Upbeat, right rotatory nystagmus  -MO      [place]-Hallpike Right Onset Time  4s  -MO      Shameka-Hallpike Right Duration Time  24s  -MO      [place]-Hallpike Left Upbeat, left rotatory nystagmus  -MO      Shameka-Hallpike Left Onset Time  3s  -MO      Shameka-Hallpike Left Duration Time  >60s  -MO                User Key  (r) = Recorded By, (t) = Taken By, (c) = Cosigned By      Initials Name Provider Type    Marilu Robles, PT Physical Therapist                                 PT Assessment/Plan       Row Name 25 1500          PT Assessment    Assessment Comments Ms. Juarez returns, reports she had good resolution in symptoms following last session for several days until return on monday when she rolled over from L to R. She does have return of BL nystagmus symptoms today however only subjectively symptomatic in L PC. Elected to treat w/ epley x2, reduction in symptoms. Symptom duration does last >60s, may indicate cupulolithiasis, will assess at next session and transition treatment as necessary. Will likely additionally benefit  from further balance and vestibular testing once cleared.  -MO        PT Plan    PT Plan Comments reassess BPPV. Formalized balance and vestibular testing  -MO               User Key  (r) = Recorded By, (t) = Taken By, (c) = Cosigned By      Initials Name Provider Type    Marilu Robles, PT Physical Therapist                        OP Exercises       Row Name 07/16/25 1541 07/16/25 1300          Subjective    Subjective Comments -- Almost canceled today because I felt good until monday, when I rolled over the clock started to go real crazy  -MO        Total Minutes    58974 -  PT Neuromuscular Reeducation Minutes --  -MO --     03801 - PT Therapeutic Activity Minutes 10  -MO --        Exercise 1    Exercise Name 1 -- epley/ CRM  -MO     Cueing 1 -- Verbal  -MO     Sets 1 -- 1  -MO     Reps 1 -- 2  -MO     Time 1 -- L PC  -MO               User Key  (r) = Recorded By, (t) = Taken By, (c) = Cosigned By      Initials Name Provider Type    Marilu Robles, PT Physical Therapist                                 PT OP Goals       Row Name 07/16/25 1300          PT Short Term Goals    STG Date to Achieve 07/17/25  -MO     STG 1 Pt. will be (-) for BPPV in L posterior canal to reduce symptoms of dizziness.  -MO     STG 1 Progress Met  -MO     STG 2 Pt. will be (-) for BPPV in R posterior canal to reduce symptoms of dizziness.  -MO     STG 2 Progress Met  -MO     STG 3 Pt will undergo further vestibular testing as needed with subsequent goals to be made  -MO     STG 3 Progress Ongoing  -MO        Long Term Goals    LTG Date to Achieve 08/16/25  -MO     LTG 1 Pt. will remain (-) for BPPV in all canals to maintain improved QOL.  -MO     LTG 1 Progress Ongoing  -MO               User Key  (r) = Recorded By, (t) = Taken By, (c) = Cosigned By      Initials Name Provider Type    Marilu Robles, PT Physical Therapist                    Therapy Education  Education Details: Reviwed findings, Reviewed BPPV vs vestibular dysfunction.  Reviewed post maneuver protocol              Time Calculation:   Start Time: 1230  Stop Time: 1253  Time Calculation (min): 23 min  Timed Charges  60254 - PT Therapeutic Activity Minutes: 10  Untimed Charges  PT Canalith Repositionin  Total Minutes  Timed Charges Total Minutes: 10  Untimed Charges Total Minutes: 13   Total Minutes: 23   Therapy Charges for Today       Code Description Service Date Service Provider Modifiers Qty    41759271668  PT THERAPEUTIC ACT EA 15 MIN 2025 Marilu Haque, PT GP 1    [id number]  PT CANALITH REPOSITIONING PER DAY 2025 Marilu Haque PT GP 1                     Marilu Haque PT  2025

## 2025-07-18 ENCOUNTER — HOSPITAL ENCOUNTER (OUTPATIENT)
Dept: PHYSICAL THERAPY | Facility: HOSPITAL | Age: 75
Setting detail: THERAPIES SERIES
Discharge: HOME | End: 2025-07-18
Payer: MEDICARE

## 2025-07-18 DIAGNOSIS — R26.89 BALANCE PROBLEM: ICD-10-CM

## 2025-07-18 DIAGNOSIS — R42 DIZZINESS: Primary | ICD-10-CM

## 2025-07-18 PROCEDURE — 97530 THERAPEUTIC ACTIVITIES: CPT

## 2025-07-18 PROCEDURE — 97112 NEUROMUSCULAR REEDUCATION: CPT

## 2025-07-18 NOTE — THERAPY PROGRESS REPORT/RE-CERT
Outpatient Physical Therapy Vestibular Progress Note  [facility]     Patient Name: Adelia Morris  : 1950  MRN: [id number]  Today's Date: 2025      Visit Date: 2025    Visit Dx:     ICD-10-CM ICD-9-CM   1. Dizziness  R42 780.4   2. Balance problem  R26.89 781.99       Patient Active Problem List   Diagnosis    Healthcare maintenance    Encounter for screening colonoscopy    Colon polyp    Acute right-sided low back pain with sciatica    Allergic drug rash    Osteopenia of hip    Cutaneous cyst    History of colon polyps    Frequent unifocal PVCs    Essential hypertension    Moderate mitral valve regurgitation        Vestibular Eval       Row Name 25 0900             Occulomotor Exam Fixation Present    Occular ROM Normal  -MP      Spontaneous Nystagmus Absent  -MP      Gaze-induced Nystagmus Absent  -MP      Smooth Pursuit Saccadic  -MP      Saccades Intact  -MP         Vestibulo-Occular Reflex (VOR)    VOR to Slow Head Movement Normal  -MP      VOR to Fast Head Movement/Head Thrust Test Positive bilaterally  -MP         Positional Testing    Positional Testing With infrared goggles  -MP      Vertebrobasilar Artery Screen - Right Negative  -MP      Vertebrobasilar Artery Screen - Left Negative  -MP      Shameak-Hallpike Right No nystagmus  -MP      Shameka-Hallpike Left No nystagmus  -MP                User Key  (r) = Recorded By, (t) = Taken By, (c) = Cosigned By      Initials Name Provider Type    Sylvia Steve, PT Physical Therapist                                 PT Assessment/Plan       Row Name 25 0900          PT Assessment    Functional Limitations Decreased safety during functional activities;Performance in leisure activities  -MP     Impairments Balance  -MP     Assessment Comments Adelia Morris has been seen for 6 physical therapy sessions for vertigo. Treatment has included therapeutic activity, neuro-muscular retraining , patient education with home exercise  program , and vestibular rehab. Progress to physical therapy goals is fair as pt. Has met 3/4 STGs with 1 new goal established, and 04 LTGs with 3 new goals established. She reports improved symptoms since last visit despite continued feelings of difficulty with balance, over initial 5 visits patient continued to demonstrate (+) BPPV testing with prolonged duration potentially indicating cupulolithiasis, however, at assessment this date (-) L and R PC with no symptom onset. Resumed VOR and initiated balance training per pt. Reports of generalized balance deficits since December. She will benefit from continued skilled physical therapy to address remaining impairments and functional limitations.  -MP     Please refer to paper survey for additional self-reported information No  -MP     Rehab Potential Good  -MP     Patient/caregiver participated in establishment of treatment plan and goals Yes  -MP     Patient would benefit from skilled therapy intervention Yes  -MP        PT Plan    PT Frequency 1x/week;2x/week  -MP     Predicted Duration of Therapy Intervention (PT) 6 visits  -MP     Planned CPT's? PT RE-EVAL: 63108;PT THER PROC EA 15 MIN: 89251;PT THER ACT EA 15 MIN: 14338;PT MANUAL THERAPY EA 15 MIN: 10814;PT NEUROMUSC RE-EDUCATION EA 15 MIN: 79738;PT GAIT TRAINING EA 15 MIN: 03812;PT SELF CARE/HOME MGMT/TRAIN EA 15: 38978;PT CANALITH REPOSITIONIN  -MP     PT Plan Comments reassess BPPV as needed; consider tandem gait, VOR with gait or on compliant surface, STS with step, issue HEP  -MP               User Key  (r) = Recorded By, (t) = Taken By, (c) = Cosigned By      Initials Name Provider Type    Sylvia Steve PT Physical Therapist                        OP Exercises       Row Name 25 0900             Subjective    Subjective Comments I slept elevated so I didnt sleep too well, I dont have any dizziness this morning. I feel pretty good.  -MP         Subjective Pain    Able to rate subjective  pain? yes  -MP      Pre-Treatment Pain Level 0  -MP      Post-Treatment Pain Level 0  -MP         Total Minutes    06279 -  PT Neuromuscular Reeducation Minutes 15  -MP      38851 - PT Therapeutic Activity Minutes 15  -MP         Exercise 1    Exercise Name 1 progress note/review of goals, reassessed BPPV  -MP      Time 1 15  -MP         Exercise 2    Exercise Name 2 seated VOR x1  -MP      Cueing 2 Verbal;Demo  -MP      Reps 2 3e  -MP      Time 2 30sec  -MP         Exercise 3    Exercise Name 3 seated VOR-C  -MP      Cueing 3 Verbal;Demo  -MP      Reps 3 3e  -MP      Time 3 30sec  -MP         Exercise 4    Exercise Name 4 rhomberg EC  -MP      Cueing 4 Verbal  -MP      Reps 4 2  -MP      Time 4 30sec  -MP         Exercise 5    Exercise Name 5 heel raises B UE support  -MP      Cueing 5 Verbal;Demo  -MP      Reps 5 20  -MP                User Key  (r) = Recorded By, (t) = Taken By, (c) = Cosigned By      Initials Name Provider Type    Sylvia Steve, PT Physical Therapist                                 PT OP Goals       Row Name 07/18/25 0900          PT Short Term Goals    STG Date to Achieve 07/17/25  -MP     STG 1 Pt. will be (-) for BPPV in L posterior canal to reduce symptoms of dizziness.  -MP     STG 1 Progress Met  -MP     STG 2 Pt. will be (-) for BPPV in R posterior canal to reduce symptoms of dizziness.  -MP     STG 2 Progress Met  -MP     STG 3 Pt will undergo further vestibular testing as needed with subsequent goals to be made  -MP     STG 3 Progress Met  -MP     STG 4 Pt. will tolerate initial VOR exercises without provocation to progress to dynamic challenges.  -MP     STG 4 Progress New  -MP        Long Term Goals    LTG Date to Achieve 08/16/25  -MP     LTG 1 Pt. will remain (-) for BPPV in all canals to maintain improved QOL.  -MP     LTG 1 Progress Ongoing  -MP     LTG 2 Patient will utilize dynamic adaptation techniques for community reintegration.  -MP     LTG 2 Progress New  -MP     LTG  3 Pt. Will be able to tolerate VOR exercises with dynamic activities to replicate daily tasks.  -MP     LTG 3 Progress New  -MP     LTG 4 Pt. will verbalize 50% improvement in condition with regards to ambulation to improve confidence with participation in activities.  -MP     LTG 4 Progress New  -MP               User Key  (r) = Recorded By, (t) = Taken By, (c) = Cosigned By      Initials Name Provider Type    Sylvia Steve, PT Physical Therapist                    Therapy Education  Given: Symptoms/condition management  Program: Reinforced  How Provided: Verbal  Level of Understanding: Verbalized              Time Calculation:   Start Time: 0906  Stop Time: 0936  Time Calculation (min): 30 min  Total Timed Code Minutes- PT: 30 minute(s)  Timed Charges  93526 -  PT Neuromuscular Reeducation Minutes: 15  56461 - PT Therapeutic Activity Minutes: 15  Total Minutes  Timed Charges Total Minutes: 30   Total Minutes: 30   Therapy Charges for Today       Code Description Service Date Service Provider Modifiers Qty    65898350824 HC PT THERAPEUTIC ACT EA 15 MIN 7/18/2025 Sylvia Ford, PT GP 1    [id number] HC PT NEUROMUSC RE EDUCATION EA 15 MIN 7/18/2025 Sylvia Ford, PT GP 1                     Sylvia Ford PT  7/18/2025

## 2025-08-01 ENCOUNTER — HOSPITAL ENCOUNTER (OUTPATIENT)
Dept: PHYSICAL THERAPY | Facility: HOSPITAL | Age: 75
Setting detail: THERAPIES SERIES
Discharge: HOME | End: 2025-08-01
Payer: MEDICARE

## 2025-08-01 DIAGNOSIS — R42 DIZZINESS: Primary | ICD-10-CM

## 2025-08-01 DIAGNOSIS — R26.89 BALANCE PROBLEM: ICD-10-CM

## 2025-08-01 PROCEDURE — 97112 NEUROMUSCULAR REEDUCATION: CPT

## 2025-08-01 NOTE — THERAPY TREATMENT NOTE
Outpatient Physical Therapy Vestibular Treatment Note  [facility]     Patient Name: Adelia Morris  : 1950  MRN: [id number]  Today's Date: 2025      Visit Date: 2025    Visit Dx:     ICD-10-CM ICD-9-CM   1. Dizziness  R42 780.4   2. Balance problem  R26.89 781.99       Patient Active Problem List   Diagnosis    Healthcare maintenance    Encounter for screening colonoscopy    Colon polyp    Acute right-sided low back pain with sciatica    Allergic drug rash    Osteopenia of hip    Cutaneous cyst    History of colon polyps    Frequent unifocal PVCs    Essential hypertension    Moderate mitral valve regurgitation                        PT Assessment/Plan       Row Name 25 0800          PT Assessment    Assessment Comments Ms. Ann garcia, continues to deny any spinning sensation, elected to continue with full session focused on retraining exercises. She continues to demonstrate most significant impairment with ambulation and head turns, added several exercises to address. Attempted walking with turns however self limits full rotation despite cueing, improved performance with stagger stance while remaining challenging. Full HEP update, will continue to progress as appropriate.  -MO        PT Plan    PT Plan Comments tolerance to last session. attempt resuming walking with head turns, STS w/ forward step + reach. head turns on noncompliant surface  -MO               User Key  (r) = Recorded By, (t) = Taken By, (c) = Cosigned By      Initials Name Provider Type    Marilu Robles, PT Physical Therapist                        OP Exercises       Row Name 25 0700             Subjective    Subjective Comments My L hip is sore today. I think my crystals are still in place, just the balance  -MO         Total Minutes    98866 -  PT Neuromuscular Reeducation Minutes 40  -MO         Exercise 2    Exercise Name 2 standing VOR x1- NBOS  -MO      Cueing 2 Verbal  -MO      Reps 2 3e  -MO       Time 2 30sec  -MO         Exercise 3    Exercise Name 3 standing VOR-C : NBOS  -MO      Cueing 3 Verbal  -MO      Reps 3 3e  -MO      Time 3 30sec  -MO         Exercise 4    Exercise Name 4 rhomberg EC  -MO      Cueing 4 Verbal  -MO      Reps 4 3B  -MO      Time 4 30sec  -MO      Additional Comments 3/4 tandem  -MO         Exercise 5    Exercise Name 5 heel raises , finger tip support  -MO      Cueing 5 Verbal  -MO      Reps 5 20  -MO         Exercise 6    Exercise Name 6 tandem walk  -MO      Cueing 6 Verbal  -MO      Reps 6 4 laps  -MO      Time 6 forward  -MO         Exercise 7    Exercise Name 7 walking march w/ SLS emphasis  -MO      Cueing 7 Verbal;Demo  -MO      Reps 7 3 laps  -MO      Time 7 3s SLS  -MO         Exercise 8    Exercise Name 8 stagger stance w/ head turns and nods  -MO      Cueing 8 Verbal;Demo  -MO      Reps 8 3e  -MO      Time 8 45s  -MO                User Key  (r) = Recorded By, (t) = Taken By, (c) = Cosigned By      Initials Name Provider Type    Marilu Robles, PT Physical Therapist                                 PT OP Goals       Row Name 08/01/25 0800          PT Short Term Goals    STG Date to Achieve 07/17/25  -MO     STG 1 Pt. will be (-) for BPPV in L posterior canal to reduce symptoms of dizziness.  -MO     STG 1 Progress Met  -MO     STG 2 Pt. will be (-) for BPPV in R posterior canal to reduce symptoms of dizziness.  -MO     STG 2 Progress Met  -MO     STG 3 Pt will undergo further vestibular testing as needed with subsequent goals to be made  -MO     STG 3 Progress Met  -MO     STG 4 Pt. will tolerate initial VOR exercises without provocation to progress to dynamic challenges.  -MO     STG 4 Progress New  -MO        Long Term Goals    LTG Date to Achieve 08/16/25  -MO     LTG 1 Pt. will remain (-) for BPPV in all canals to maintain improved QOL.  -MO     LTG 1 Progress Ongoing  -MO     LTG 2 Patient will utilize dynamic adaptation techniques for community reintegration.  -MO      LTG 2 Progress Ongoing  -MO     LTG 3 Pt. Will be able to tolerate VOR exercises with dynamic activities to replicate daily tasks.  -MO     LTG 3 Progress Ongoing  -MO     LTG 4 Pt. will verbalize 50% improvement in condition with regards to ambulation to improve confidence with participation in activities.  -MO     LTG 4 Progress Ongoing  -MO               User Key  (r) = Recorded By, (t) = Taken By, (c) = Cosigned By      Initials Name Provider Type    Marilu Robles, PT Physical Therapist                    Therapy Education  Education Details: Updated HEP  Given: HEP, Symptoms/condition management  Program: New, Reinforced  How Provided: Verbal, Demonstration, Written  Provided to: Patient  Level of Understanding: Verbalized, Demonstrated              Time Calculation:   Start Time: 0745  Stop Time: 0825  Time Calculation (min): 40 min  Timed Charges  51965 -  PT Neuromuscular Reeducation Minutes: 40  Total Minutes  Timed Charges Total Minutes: 40   Total Minutes: 40   Therapy Charges for Today       Code Description Service Date Service Provider Modifiers Qty    20326180488 HC PT NEUROMUSC RE EDUCATION EA 15 MIN 8/1/2025 Marilu Haque, PT GP 3                     Marilu Haque PT  8/1/2025

## 2025-08-08 ENCOUNTER — HOSPITAL ENCOUNTER (OUTPATIENT)
Dept: PHYSICAL THERAPY | Facility: HOSPITAL | Age: 75
Setting detail: THERAPIES SERIES
Discharge: HOME | End: 2025-08-08
Payer: MEDICARE

## 2025-08-08 DIAGNOSIS — R26.89 BALANCE PROBLEM: ICD-10-CM

## 2025-08-08 DIAGNOSIS — R42 DIZZINESS: Primary | ICD-10-CM

## 2025-08-08 PROCEDURE — 97112 NEUROMUSCULAR REEDUCATION: CPT

## 2025-08-08 PROCEDURE — 97110 THERAPEUTIC EXERCISES: CPT

## 2025-08-12 RX ORDER — METOPROLOL SUCCINATE 25 MG/1
50 TABLET, EXTENDED RELEASE ORAL 2 TIMES DAILY
Qty: 180 TABLET | Refills: 1 | Status: SHIPPED | OUTPATIENT
Start: 2025-08-12 | End: 2025-08-18 | Stop reason: DRUGHIGH

## 2025-08-15 ENCOUNTER — HOSPITAL ENCOUNTER (OUTPATIENT)
Dept: PHYSICAL THERAPY | Facility: HOSPITAL | Age: 75
Setting detail: THERAPIES SERIES
Discharge: HOME | End: 2025-08-15
Payer: MEDICARE

## 2025-08-15 DIAGNOSIS — R42 DIZZINESS: Primary | ICD-10-CM

## 2025-08-15 DIAGNOSIS — R26.89 BALANCE PROBLEM: ICD-10-CM

## 2025-08-15 PROCEDURE — 97112 NEUROMUSCULAR REEDUCATION: CPT

## 2025-08-15 PROCEDURE — 97110 THERAPEUTIC EXERCISES: CPT

## 2025-08-18 RX ORDER — RANOLAZINE 500 MG/1
500 TABLET, EXTENDED RELEASE ORAL 2 TIMES DAILY
Qty: 120 TABLET | Refills: 3 | Status: SHIPPED | OUTPATIENT
Start: 2025-08-18

## 2025-08-18 RX ORDER — METOPROLOL SUCCINATE 50 MG/1
50 TABLET, EXTENDED RELEASE ORAL 2 TIMES DAILY
Qty: 180 TABLET | Refills: 0 | Status: SHIPPED | OUTPATIENT
Start: 2025-08-18

## 2025-08-20 ENCOUNTER — OFFICE VISIT (OUTPATIENT)
Dept: INTERNAL MEDICINE | Facility: CLINIC | Age: 75
End: 2025-08-20
Payer: MEDICARE

## 2025-08-20 VITALS
OXYGEN SATURATION: 94 % | HEIGHT: 66 IN | HEART RATE: 65 BPM | WEIGHT: 193 LBS | DIASTOLIC BLOOD PRESSURE: 72 MMHG | BODY MASS INDEX: 31.02 KG/M2 | SYSTOLIC BLOOD PRESSURE: 128 MMHG | TEMPERATURE: 97.5 F

## 2025-08-20 DIAGNOSIS — Z78.0 POST-MENOPAUSAL: ICD-10-CM

## 2025-08-20 DIAGNOSIS — M85.852 OSTEOPENIA OF BOTH HIPS: ICD-10-CM

## 2025-08-20 DIAGNOSIS — M85.851 OSTEOPENIA OF BOTH HIPS: ICD-10-CM

## 2025-08-20 DIAGNOSIS — Z00.00 MEDICARE ANNUAL WELLNESS VISIT, SUBSEQUENT: Primary | ICD-10-CM

## 2025-08-20 LAB
25(OH)D3+25(OH)D2 SERPL-MCNC: 24.2 NG/ML (ref 30–100)
BASOPHILS # BLD AUTO: 0.06 10*3/MM3 (ref 0–0.2)
BASOPHILS NFR BLD AUTO: 1.1 % (ref 0–1.5)
EOSINOPHIL # BLD AUTO: 0.14 10*3/MM3 (ref 0–0.4)
EOSINOPHIL NFR BLD AUTO: 2.6 % (ref 0.3–6.2)
ERYTHROCYTE [DISTWIDTH] IN BLOOD BY AUTOMATED COUNT: 12.5 % (ref 12.3–15.4)
HCT VFR BLD AUTO: 38.8 % (ref 34–46.6)
HGB BLD-MCNC: 13.2 G/DL (ref 12–15.9)
IMM GRANULOCYTES # BLD AUTO: 0.01 10*3/MM3 (ref 0–0.05)
IMM GRANULOCYTES NFR BLD AUTO: 0.2 % (ref 0–0.5)
LYMPHOCYTES # BLD AUTO: 1.54 10*3/MM3 (ref 0.7–3.1)
LYMPHOCYTES NFR BLD AUTO: 28.8 % (ref 19.6–45.3)
MCH RBC QN AUTO: 31.7 PG (ref 26.6–33)
MCHC RBC AUTO-ENTMCNC: 34 G/DL (ref 31.5–35.7)
MCV RBC AUTO: 93 FL (ref 79–97)
MONOCYTES # BLD AUTO: 0.48 10*3/MM3 (ref 0.1–0.9)
MONOCYTES NFR BLD AUTO: 9 % (ref 5–12)
NEUTROPHILS # BLD AUTO: 3.12 10*3/MM3 (ref 1.7–7)
NEUTROPHILS NFR BLD AUTO: 58.3 % (ref 42.7–76)
NRBC BLD AUTO-RTO: 0 /100 WBC (ref 0–0.2)
PLATELET # BLD AUTO: 263 10*3/MM3 (ref 140–450)
RBC # BLD AUTO: 4.17 10*6/MM3 (ref 3.77–5.28)
WBC # BLD AUTO: 5.35 10*3/MM3 (ref 3.4–10.8)

## (undated) DEVICE — SENSR O2 OXIMAX FNGR A/ 18IN NONSTR

## (undated) DEVICE — CANN O2 ETCO2 FITS ALL CONN CO2 SMPL A/ 7IN DISP LF

## (undated) DEVICE — TUBING, SUCTION, 1/4" X 10', STRAIGHT: Brand: MEDLINE

## (undated) DEVICE — KT ORCA ORCAPOD DISP STRL

## (undated) DEVICE — ADAPT CLN BIOGUARD AIR/H2O DISP

## (undated) DEVICE — LN SMPL CO2 SHTRM SD STREAM W/M LUER